# Patient Record
Sex: FEMALE | Race: WHITE | Employment: FULL TIME | URBAN - METROPOLITAN AREA
[De-identification: names, ages, dates, MRNs, and addresses within clinical notes are randomized per-mention and may not be internally consistent; named-entity substitution may affect disease eponyms.]

---
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
## 2018-01-22 VITALS
WEIGHT: 250 LBS | BODY MASS INDEX: 37.03 KG/M2 | HEIGHT: 69 IN | SYSTOLIC BLOOD PRESSURE: 134 MMHG | DIASTOLIC BLOOD PRESSURE: 84 MMHG

## 2018-03-10 ENCOUNTER — TELEPHONE (OUTPATIENT)
Dept: OBGYN CLINIC | Facility: CLINIC | Age: 18
End: 2018-03-10

## 2018-03-12 DIAGNOSIS — Z30.41 ENCOUNTER FOR SURVEILLANCE OF CONTRACEPTIVE PILLS: Primary | ICD-10-CM

## 2018-03-12 RX ORDER — DROSPIRENONE AND ETHINYL ESTRADIOL TABLETS 0.02-3(28)
1 KIT ORAL DAILY
Qty: 90 TABLET | Refills: 0 | Status: SHIPPED | OUTPATIENT
Start: 2018-03-12 | End: 2018-04-03 | Stop reason: SDUPTHER

## 2018-03-12 RX ORDER — DROSPIRENONE AND ETHINYL ESTRADIOL TABLETS 0.02-3(28)
KIT ORAL
COMMUNITY
Start: 2018-01-27 | End: 2018-03-12 | Stop reason: SDUPTHER

## 2018-03-20 LAB
ATYPICAL LYMPH (HISTORICAL): 5
BASOPHILS # BLD AUTO: 0.1 X3/UL (ref 0–0.3)
BASOPHILS # BLD AUTO: 0.7 % (ref 0–2)
DEPRECATED RDW RBC AUTO: 19.2 % (ref 11.5–14.5)
EOSINOPHIL # BLD AUTO: 0.2 X3/UL (ref 0–0.5)
EOSINOPHIL NFR BLD AUTO: 1.5 % (ref 0–5)
HCT VFR BLD AUTO: 32 % (ref 37–47)
HGB BLD-MCNC: 10.1 G/DL (ref 12–16)
LYMPHOCYTES # BLD AUTO: 7.8 X3/UL (ref 1.2–4.2)
LYMPHOCYTES NFR BLD AUTO: 48 % (ref 20.5–51.1)
LYMPHOCYTES NFR BLD AUTO: 72.8 % (ref 20.5–51.1)
MCH RBC QN AUTO: 20.9 PG (ref 26–34)
MCHC RBC AUTO-ENTMCNC: 31.6 G/DL (ref 31–36)
MCV RBC AUTO: 66.3 FL (ref 81–99)
MICROCYTOSIS (HISTORICAL): ABNORMAL
MONOCYTES # BLD AUTO: 0.6 X3/UL (ref 0–1)
MONOCYTES (HISTORICAL): 25 % (ref 1.7–12)
MONOCYTES NFR BLD AUTO: 5.7 % (ref 1.7–12)
NEUTROPHILS # BLD AUTO: 2.1 X3/UL (ref 1.4–6.5)
NEUTROPHILS ABS COUNT (HISTORICAL): 22 % (ref 42.2–75.2)
NEUTS SEG NFR BLD AUTO: 19.3 % (ref 42.2–75.2)
PLATELET # BLD AUTO: 286 X3/UL (ref 130–400)
PLATELET ESTIMATE (HISTORICAL): NORMAL
PMV BLD AUTO: 9.5 FL (ref 8.6–11.7)
POIKILOCYTOSIS (HISTORICAL): SLIGHT
RBC # BLD AUTO: 4.83 X6/UL (ref 3.9–5.2)
WBC # BLD AUTO: 10.7 X3/UL (ref 4.8–10.8)

## 2018-03-23 RX ORDER — DROSPIRENONE AND ETHINYL ESTRADIOL TABLETS 0.02-3(28)
KIT ORAL
Qty: 28 TABLET | Refills: 3 | OUTPATIENT
Start: 2018-03-23

## 2018-03-23 NOTE — TELEPHONE ENCOUNTER
Maya please refuse medications so that we can drop this off of our task bin    Patient aware no more refills until pillcheck is scheduled

## 2018-03-27 ENCOUNTER — TELEPHONE (OUTPATIENT)
Dept: OBGYN CLINIC | Facility: CLINIC | Age: 18
End: 2018-03-27

## 2018-04-03 ENCOUNTER — OFFICE VISIT (OUTPATIENT)
Dept: OBGYN CLINIC | Facility: CLINIC | Age: 18
End: 2018-04-03
Payer: COMMERCIAL

## 2018-04-03 VITALS
DIASTOLIC BLOOD PRESSURE: 78 MMHG | WEIGHT: 250 LBS | BODY MASS INDEX: 37.03 KG/M2 | HEIGHT: 69 IN | SYSTOLIC BLOOD PRESSURE: 118 MMHG

## 2018-04-03 DIAGNOSIS — Z30.41 ENCOUNTER FOR SURVEILLANCE OF CONTRACEPTIVE PILLS: ICD-10-CM

## 2018-04-03 DIAGNOSIS — N94.3 PMS (PREMENSTRUAL SYNDROME): Primary | ICD-10-CM

## 2018-04-03 PROCEDURE — 99213 OFFICE O/P EST LOW 20 MIN: CPT | Performed by: PHYSICIAN ASSISTANT

## 2018-04-03 RX ORDER — CLONIDINE HYDROCHLORIDE 0.1 MG/1
TABLET ORAL
Refills: 3 | COMMUNITY
End: 2019-08-03

## 2018-04-03 RX ORDER — HYDROXYZINE 50 MG/1
TABLET, FILM COATED ORAL
COMMUNITY
End: 2019-03-13

## 2018-04-03 RX ORDER — OMEPRAZOLE 20 MG/1
CAPSULE, DELAYED RELEASE ORAL
Refills: 10 | COMMUNITY
Start: 2018-03-07 | End: 2019-08-03

## 2018-04-03 RX ORDER — DIPHENHYDRAMINE HYDROCHLORIDE 25 MG/1
75 CAPSULE ORAL
Refills: 2 | COMMUNITY
Start: 2018-03-02 | End: 2019-08-03

## 2018-04-03 RX ORDER — TRIHEXYPHENIDYL HYDROCHLORIDE 5 MG/1
TABLET ORAL
COMMUNITY
Start: 2018-04-01 | End: 2019-08-03

## 2018-04-03 RX ORDER — ESCITALOPRAM OXALATE 20 MG/1
20 TABLET ORAL EVERY MORNING
Refills: 3 | COMMUNITY
Start: 2018-03-23 | End: 2019-03-13

## 2018-04-03 RX ORDER — ZIPRASIDONE HYDROCHLORIDE 20 MG/1
20 CAPSULE ORAL
Refills: 2 | COMMUNITY
Start: 2018-03-12 | End: 2019-03-13

## 2018-04-03 RX ORDER — ALBUTEROL SULFATE 90 UG/1
AEROSOL, METERED RESPIRATORY (INHALATION)
COMMUNITY
Start: 2017-06-30

## 2018-04-03 RX ORDER — FERROUS SULFATE 325(65) MG
TABLET ORAL
Refills: 1 | COMMUNITY
Start: 2018-03-08 | End: 2019-08-03

## 2018-04-03 RX ORDER — ZIPRASIDONE HYDROCHLORIDE 80 MG/1
80 CAPSULE ORAL 2 TIMES DAILY
Refills: 2 | COMMUNITY
Start: 2018-03-18 | End: 2019-03-13

## 2018-04-03 RX ORDER — DROSPIRENONE AND ETHINYL ESTRADIOL TABLETS 0.02-3(28)
1 KIT ORAL DAILY
Qty: 90 TABLET | Refills: 0 | Status: SHIPPED | OUTPATIENT
Start: 2018-04-03 | End: 2018-05-08 | Stop reason: ALTCHOICE

## 2018-04-03 NOTE — PROGRESS NOTES
Assessment/Plan:    PMS (premenstrual syndrome)  Reviewed birth control options with patient including Depo Provera, Nexplanon, NuvaRing, Mirena and Paragard IUD  Most interested in C/ Canarias 9  Reviewed insertion, removal, common bleeding patterns and side effects  Reviewed risks of damage to nerves, blood vessels during insertion or removal  Form to check insurance for coverage given to patient today, patient is planning on changing insurance in the next months  Will wait until new insurance card comes in and then fax form over with new card  Will plan to insert with menses pending insurance coverage  Would like to continue Carrol in meantime  Problem List Items Addressed This Visit     PMS (premenstrual syndrome) - Primary     Reviewed birth control options with patient including Depo Provera, Nexplanon, NuvaRing, Mirena and Paragard IUD  Most interested in C/ Canarias 9  Reviewed insertion, removal, common bleeding patterns and side effects  Reviewed risks of damage to nerves, blood vessels during insertion or removal  Form to check insurance for coverage given to patient today, patient is planning on changing insurance in the next months  Will wait until new insurance card comes in and then fax form over with new card  Will plan to insert with menses pending insurance coverage  Would like to continue Carrol in meantime  Relevant Medications    escitalopram (LEXAPRO) 20 mg tablet    ziprasidone (GEODON) 80 mg capsule    ziprasidone (GEODON) 20 mg capsule    hydrOXYzine HCL (ATARAX) 50 mg tablet      Other Visit Diagnoses     Encounter for surveillance of contraceptive pills        Relevant Medications    LORYNA 3-0 02 MG per tablet            Subjective:      Patient ID: Debi Ortez is a 21 y o  female  HPI  20 yo seen for pill check, was started on Carrol OCP 10/31/2017 for increased PMS symptoms  Patient reports has not noticed a difference   Patient does have extensive psychiatric history and follows closely with psychiatrist  Reports noticing more weight gain on this pill and issues with binge eating, would like to try something different interested in longer term method of birth control  The following portions of the patient's history were reviewed and updated as appropriate:   She  has no past medical history on file  She   Patient Active Problem List    Diagnosis Date Noted    PMS (premenstrual syndrome) 10/31/2017    Breast abscess 12/02/2016    Depression 12/02/2016     She  has no past surgical history on file  Her family history includes Leukemia in her mother  She  reports that she has never smoked  She does not have any smokeless tobacco history on file  She reports that she does not drink alcohol or use drugs  Current Outpatient Prescriptions   Medication Sig Dispense Refill    albuterol (PROVENTIL HFA,VENTOLIN HFA) 90 mcg/act inhaler 2 puff(s)      BANOPHEN 25 MG capsule Take 75 mg by mouth daily at bedtime  2    cloNIDine (CATAPRES) 0 1 mg tablet TAKE 1/2 TABLET TWICE A DAY AND 1 TABLET AT BEDTIME  3    escitalopram (LEXAPRO) 20 mg tablet 20 mg every morning  3    ferrous sulfate 325 (65 Fe) mg tablet TAKE 1 TABLET 3 TIMES A DAY BY ORAL ROUTE   1    hydrOXYzine HCL (ATARAX) 50 mg tablet 1 tab(s)      LORYNA 3-0 02 MG per tablet Take 1 tablet by mouth daily 90 tablet 0    omeprazole (PriLOSEC) 20 mg delayed release capsule TAKE ONE CAPSULE BY MOUTH TWICE A DAY BEFORE MEALS  10    trihexyphenidyl (ARTANE) 5 mg tablet       ziprasidone (GEODON) 20 mg capsule Take 20 mg by mouth daily at bedtime  2    ziprasidone (GEODON) 80 mg capsule Take 80 mg by mouth 2 (two) times a day  2     No current facility-administered medications for this visit  She has no allergies on file       Review of Systems   Constitutional: Negative for fatigue, fever and unexpected weight change  HENT: Negative for dental problem and sinus pressure  Eyes: Negative for visual disturbance  Respiratory: Negative for cough, shortness of breath and wheezing  Cardiovascular: Negative for chest pain  Gastrointestinal: Negative for abdominal pain, blood in stool, constipation, diarrhea, nausea and vomiting  Endocrine: Negative for polydipsia  Genitourinary: Negative for difficulty urinating, dyspareunia, dysuria, frequency, hematuria, pelvic pain and urgency  Musculoskeletal: Negative for arthralgias and back pain  Neurological: Negative for dizziness, seizures, light-headedness and headaches  Psychiatric/Behavioral: Negative for suicidal ideas  The patient is not nervous/anxious  Objective:      /78 (BP Location: Left arm, Patient Position: Sitting, Cuff Size: Adult)   Ht 5' 9" (1 753 m)   Wt 113 kg (250 lb)   BMI 36 92 kg/m²          Physical Exam   Constitutional: She is oriented to person, place, and time  She appears well-developed and well-nourished  Neck: No thyromegaly present  Cardiovascular: Normal rate, regular rhythm and normal heart sounds  Exam reveals no gallop and no friction rub  No murmur heard  Pulmonary/Chest: Effort normal and breath sounds normal  No respiratory distress  She has no wheezes  She has no rales  She exhibits no tenderness  Neurological: She is alert and oriented to person, place, and time  Skin: Skin is warm and dry  Psychiatric: She has a normal mood and affect   Her behavior is normal

## 2018-04-03 NOTE — ASSESSMENT & PLAN NOTE
Reviewed birth control options with patient including Depo Provera, Nexplanon, NuvaRing, Mirena and Paragard IUD  Most interested in C/ Canarias 9  Reviewed insertion, removal, common bleeding patterns and side effects  Reviewed risks of damage to nerves, blood vessels during insertion or removal  Form to check insurance for coverage given to patient today, patient is planning on changing insurance in the next months  Will wait until new insurance card comes in and then fax form over with new card  Will plan to insert with menses pending insurance coverage  Would like to continue Carrol in meantime

## 2018-04-20 ENCOUNTER — TELEPHONE (OUTPATIENT)
Dept: OBGYN CLINIC | Facility: CLINIC | Age: 18
End: 2018-04-20

## 2018-04-24 ENCOUNTER — TELEPHONE (OUTPATIENT)
Dept: OBGYN CLINIC | Facility: CLINIC | Age: 18
End: 2018-04-24

## 2018-04-26 ENCOUNTER — TELEPHONE (OUTPATIENT)
Dept: LABOR AND DELIVERY | Facility: HOSPITAL | Age: 18
End: 2018-04-26

## 2018-04-26 NOTE — TELEPHONE ENCOUNTER
Phone call to patient  Reviewed with patient device nexplanon function, risks, benefits, and placement procedure  Patient reports understanding and consent with no further questions  Patient desires to proceed and will follow up at appointment

## 2018-05-08 ENCOUNTER — PROCEDURE VISIT (OUTPATIENT)
Dept: OBGYN CLINIC | Facility: CLINIC | Age: 18
End: 2018-05-08
Payer: COMMERCIAL

## 2018-05-08 VITALS
DIASTOLIC BLOOD PRESSURE: 88 MMHG | HEIGHT: 68 IN | WEIGHT: 250 LBS | BODY MASS INDEX: 37.89 KG/M2 | SYSTOLIC BLOOD PRESSURE: 128 MMHG

## 2018-05-08 DIAGNOSIS — Z30.017 NEXPLANON INSERTION: Primary | ICD-10-CM

## 2018-05-08 PROBLEM — L70.0 ACNE VULGARIS: Status: ACTIVE | Noted: 2018-05-08

## 2018-05-08 PROBLEM — K21.9 GASTROESOPHAGEAL REFLUX DISEASE: Status: ACTIVE | Noted: 2018-05-08

## 2018-05-08 PROBLEM — F32.A DEPRESSIVE DISORDER: Status: ACTIVE | Noted: 2018-05-08

## 2018-05-08 PROBLEM — D64.9 ANEMIA: Status: ACTIVE | Noted: 2018-03-01

## 2018-05-08 PROBLEM — F41.1 ANXIETY STATE: Status: ACTIVE | Noted: 2018-05-08

## 2018-05-08 LAB — SL AMB POCT URINE HCG: NEGATIVE

## 2018-05-08 PROCEDURE — 11981 INSERTION DRUG DLVR IMPLANT: CPT | Performed by: PHYSICIAN ASSISTANT

## 2018-05-08 PROCEDURE — 81025 URINE PREGNANCY TEST: CPT | Performed by: PHYSICIAN ASSISTANT

## 2018-05-08 NOTE — PROGRESS NOTES
Remove and insert drug implant  Date/Time: 5/8/2018 1:26 PM  Performed by: Carolina Poole  Authorized by: Carolina Poole     Consent:     Consent obtained:  Verbal and written    Consent given by:  Patient    Procedural risks discussed:  Bleeding, damage to other organs, possible continued pain, possible loss of function, failure rate and infection    Patient questions answered: yes      Patient agrees, verbalizes understanding, and wants to proceed: yes      Instructions and paperwork completed: yes    Indication:     Indication: Insertion of non-biodegradable drug delivery implant    Pre-procedure:     Prepped with: povidone-iodine      Local anesthetic:  Lidocaine 1%    The site was cleaned and prepped in a sterile fashion: yes    Procedure:     Procedure:   Insertion    Left/right:  Left    Preloaded Implanon trocar was placed subdermally: yes      Visualization of implant was obtained: yes      Implanon was inserted and trocar removed: yes      Palpitation confirms placement by provider and patient: yes      Site was closed with steri-strips and pressure bandage applied: yes

## 2018-05-17 ENCOUNTER — TELEPHONE (OUTPATIENT)
Dept: OBGYN CLINIC | Facility: CLINIC | Age: 18
End: 2018-05-17

## 2018-05-22 ENCOUNTER — TELEPHONE (OUTPATIENT)
Dept: OBGYN CLINIC | Facility: CLINIC | Age: 18
End: 2018-05-22

## 2018-05-22 ENCOUNTER — OFFICE VISIT (OUTPATIENT)
Dept: OBGYN CLINIC | Facility: CLINIC | Age: 18
End: 2018-05-22
Payer: COMMERCIAL

## 2018-05-22 VITALS
WEIGHT: 255 LBS | BODY MASS INDEX: 37.77 KG/M2 | SYSTOLIC BLOOD PRESSURE: 140 MMHG | HEIGHT: 69 IN | DIASTOLIC BLOOD PRESSURE: 92 MMHG

## 2018-05-22 DIAGNOSIS — Z30.46 NEXPLANON REMOVAL: Primary | ICD-10-CM

## 2018-05-22 PROCEDURE — 11982 REMOVE DRUG IMPLANT DEVICE: CPT | Performed by: NURSE PRACTITIONER

## 2018-05-22 NOTE — ASSESSMENT & PLAN NOTE
Pt presents today with complaints of increased mood swings and anxiety since placement of Nexplanon on 5/9/18  Pt states she followed up with psych after implant was placed and was advised that can be normal when changing hormones and birth controls  Pt states anxiety has become increasingly worse  Pt also states Nexplanon has increased her appetite and she has gained weight since placed and she is worried about weight gain as medication she was previously on made her gain weight and doesn't want to gain anymore weight  Pt states vaginal bleeding is irregular, but that does not bother her  Reviewed with patient normal after introducing new hormones to have an increase in mood swings and anxiety usually worse in the first month and will improve by month 3  Encouraged patient to keep Nexplanon a little longer to see if symptoms improve  Reviewed anxiety increasing may not be due to nexplanon and may remove nexplanon and anxiety may remain increased therefore highly encourage patient to follow with psych  Pt states she would like Nexplanon removed and declines any other form of contraception at this time  Would like to see how she feels free of hormones for a while  Reviewed with patient barrier method as contraception as return to fertility is almost immediate post Nexplanon removal   No heavy lifting with left arm for 48 hours  May remove bandage in 24 hours  RTO for annual exam or sooner as needed

## 2018-05-22 NOTE — PROGRESS NOTES
Remove and insert drug implant  Date/Time: 5/22/2018 4:00 PM  Performed by: Bill Fry  Authorized by: Bill Fry     Consent:     Consent obtained:  Verbal    Consent given by:  Patient    Procedural risks discussed:  Bleeding, damage to other organs, failure rate, infection, possible continued pain and repeat procedure    Patient questions answered: yes      Patient agrees, verbalizes understanding, and wants to proceed: yes    Indication:     Indication: Presence of non-biodegradable drug delivery implant    Pre-procedure:     Pre-procedure timeout performed: yes      Prepped with: povidone-iodine      Local anesthetic: 2% lidocaine  The site was cleaned and prepped in a sterile fashion: yes    Procedure:     Procedure:  Removal    Small stab incision was made in arm: yes      Left/right:  Left    Site was closed with steri-strips and pressure bandage applied: yes    Comments:      Nexplanon removed intact, patient visualized intact implant after removal      Nexplanon removal  Pt presents today with complaints of increased mood swings and anxiety since placement of Nexplanon on 5/9/18  Pt states she followed up with psych after implant was placed and was advised that can be normal when changing hormones and birth controls  Pt states anxiety has become increasingly worse  Pt also states Nexplanon has increased her appetite and she has gained weight since placed and she is worried about weight gain as medication she was previously on made her gain weight and doesn't want to gain anymore weight  Pt states vaginal bleeding is irregular, but that does not bother her  Reviewed with patient normal after introducing new hormones to have an increase in mood swings and anxiety usually worse in the first month and will improve by month 3  Encouraged patient to keep Nexplanon a little longer to see if symptoms improve   Reviewed anxiety increasing may not be due to nexplanon and may remove nexplanon and anxiety may remain increased therefore highly encourage patient to follow with psych  Pt states she would like Nexplanon removed and declines any other form of contraception at this time  Would like to see how she feels free of hormones for a while  Reviewed with patient barrier method as contraception as return to fertility is almost immediate post Nexplanon removal   No heavy lifting with left arm for 48 hours  May remove bandage in 24 hours  RTO for annual exam or sooner as needed

## 2018-06-27 DIAGNOSIS — Z30.41 ENCOUNTER FOR SURVEILLANCE OF CONTRACEPTIVE PILLS: ICD-10-CM

## 2018-06-27 RX ORDER — DROSPIRENONE AND ETHINYL ESTRADIOL TABLETS 0.02-3(28)
1 KIT ORAL DAILY
Qty: 84 TABLET | Refills: 0 | OUTPATIENT
Start: 2018-06-27

## 2018-07-19 DIAGNOSIS — Z30.41 ENCOUNTER FOR SURVEILLANCE OF CONTRACEPTIVE PILLS: ICD-10-CM

## 2018-07-20 ENCOUNTER — TELEPHONE (OUTPATIENT)
Dept: OBGYN CLINIC | Facility: CLINIC | Age: 18
End: 2018-07-20

## 2018-07-20 DIAGNOSIS — Z30.41 SURVEILLANCE OF CONTRACEPTIVE PILL: Primary | ICD-10-CM

## 2018-07-20 RX ORDER — DROSPIRENONE AND ETHINYL ESTRADIOL TABLETS 0.02-3(28)
1 KIT ORAL DAILY
Refills: 0 | COMMUNITY
Start: 2018-05-23 | End: 2018-07-20 | Stop reason: SDUPTHER

## 2018-07-20 RX ORDER — DROSPIRENONE AND ETHINYL ESTRADIOL TABLETS 0.02-3(28)
1 KIT ORAL DAILY
Qty: 84 TABLET | Refills: 0 | OUTPATIENT
Start: 2018-07-20

## 2018-07-20 RX ORDER — DROSPIRENONE AND ETHINYL ESTRADIOL TABLETS 0.02-3(28)
1 KIT ORAL DAILY
Qty: 90 TABLET | Refills: 3 | Status: SHIPPED | OUTPATIENT
Start: 2018-07-20 | End: 2019-03-13

## 2018-07-20 NOTE — TELEPHONE ENCOUNTER
Zandra Joseph's note said she she was going to trial off OCP after nexplanon removal  I sent script for [facility] into pharmacy

## 2018-07-20 NOTE — TELEPHONE ENCOUNTER
Needs refill for ocp  Had nexplanon removed and wants to go back on pill  [phone] is mom number  Pharmacy is [facility] in [place] [phone]

## 2018-07-20 NOTE — TELEPHONE ENCOUNTER
I see you denied rx stating patient is no longer on ocp  According to phone call from mom, she is back on ocp and nexplanon was removed  Please enter rx if appropriate or let me know what we need to do next

## 2018-11-21 ENCOUNTER — HOSPITAL ENCOUNTER (EMERGENCY)
Facility: HOSPITAL | Age: 18
Discharge: HOME | End: 2018-11-21
Attending: EMERGENCY MEDICINE | Admitting: EMERGENCY MEDICINE
Payer: COMMERCIAL

## 2018-11-21 VITALS
BODY MASS INDEX: 38.69 KG/M2 | SYSTOLIC BLOOD PRESSURE: 137 MMHG | DIASTOLIC BLOOD PRESSURE: 91 MMHG | WEIGHT: 262 LBS | HEART RATE: 98 BPM | TEMPERATURE: 99.3 F | RESPIRATION RATE: 16 BRPM | OXYGEN SATURATION: 97 %

## 2018-11-21 DIAGNOSIS — Z72.89 DELIBERATE SELF-CUTTING: ICD-10-CM

## 2018-11-21 DIAGNOSIS — F32.A DEPRESSED: Primary | ICD-10-CM

## 2018-11-21 LAB
AMPHETAMINES SERPL QL SCN: NEGATIVE
BARBITURATES UR QL: NEGATIVE
BENZODIAZ UR QL: NEGATIVE
COCAINE UR QL: NEGATIVE
ETHANOL EXG-MCNC: 0 MG/DL
EXT PREG TEST URINE: NEGATIVE
METHADONE UR QL: NEGATIVE
OPIATES UR QL SCN: NEGATIVE
PCP UR QL: NEGATIVE
THC UR QL: NEGATIVE

## 2018-11-21 PROCEDURE — 99284 EMERGENCY DEPT VISIT MOD MDM: CPT

## 2018-11-21 PROCEDURE — 82075 ASSAY OF BREATH ETHANOL: CPT | Performed by: EMERGENCY MEDICINE

## 2018-11-21 PROCEDURE — 80307 DRUG TEST PRSMV CHEM ANLYZR: CPT | Performed by: EMERGENCY MEDICINE

## 2018-11-21 PROCEDURE — 81025 URINE PREGNANCY TEST: CPT | Performed by: EMERGENCY MEDICINE

## 2018-11-21 RX ORDER — LAMOTRIGINE 100 MG/1
25 TABLET ORAL DAILY
COMMUNITY
Start: 2018-11-09 | End: 2019-08-03

## 2018-11-21 NOTE — DISCHARGE INSTRUCTIONS
Depression   AMBULATORY CARE:   Depression  is a medical condition that causes feelings of sadness or hopelessness that do not go away  Depression may cause you to lose interest in things you used to enjoy  These feelings may interfere with your daily life  Common symptoms include the following:   · Appetite changes, or weight gain or loss    · Trouble going to sleep or staying asleep, or sleeping too much    · Fatigue or lack of energy    · Feeling restless, irritable, or withdrawn    · Feeling worthless, hopeless, discouraged, or guilty    · Trouble concentrating, remembering things, doing daily tasks, or making decisions    · Thoughts about hurting or killing yourself  Call 911 for any of the following:   · You think about harming yourself or someone else  Contact your healthcare provider if:   · Your symptoms do not improve  · You cannot make it to your next appointment  · You have new symptoms  · You have questions or concerns about your condition or care  Treatment for depression  may include medicine to improve or balance your mood  Therapy may also be used to treat your depression  A therapist will help you learn to cope with your thoughts and feelings  Therapy can be done alone or in a group  It may also be done with family members or a significant other  Self-care:   · Get regular physical activity  Try to exercise for 30 minutes, 3 to 5 days a week  Work with your healthcare provider to develop an exercise plan that you enjoy  Physical activity may improve your symptoms  · Get enough sleep  Create a routine to help you relax before bed  You can listen to music, read, or do yoga  Try to go to bed and wake up at the same time every day  Sleep is important for emotional health  · Eat a variety of healthy foods from all of the food groups  A healthy meal plan is low in fat, salt, and added sugar   Ask your healthcare provider for more information about a meal plan that is right for you  · Do not drink alcohol or use drugs  Alcohol and drugs can make your symptoms worse  Follow up with your healthcare provider as directed: Your healthcare provider will monitor your progress at follow-up visits  He or she will also monitor your medicine if you take antidepressants  Your healthcare provider will ask if the medicine is helping  Tell him or her about any side effects or problems you may have with your medicine  The type or amount of medicine may need to be changed  Write down your questions so you remember to ask them during your visits  © 2017 [employer]  Information is for End User's use only and may not be sold, redistributed or otherwise used for commercial purposes  All illustrations and images included in CareNotes® are the copyrighted property of A D [employer] M [employer] Inc  or [employer] Skinner  The above information is an  only  It is not intended as medical advice for individual conditions or treatments  Talk to your doctor, nurse or pharmacist before following any medical regimen to see if it is safe and effective for you

## 2018-11-21 NOTE — ED NOTES
Gave pt back all her belongings for discharge     Sintia Mota  11/21/18 [address] [email or web address]

## 2018-11-21 NOTE — ED PROVIDER NOTES
History  Chief Complaint   Patient presents with    Psychiatric Evaluation     Pt reports feeling depressed after a break up several days ago  Pt crying during triage, deferring questions to her grandparents  Pt reports hx of SI with admission last year  Reports cutting her arms and legs today  History provided by:  Patient   used: No     76-year-old female with a history of bipolar disorder presented with increased depressive symptoms over the past few weeks likely related to a recent break-up with a boyfriend  She noted that today she began cutting her right thigh  States that she had cut in the past but has not been for about a year  She has the clean, new razor in the cut multiple horizontal superficial lacerations  There is no active bleeding  No signs of infection on exam   There is no drainage  Her tetanus is up-to-date  Advised bacitracin/Neosporin daily and wash with soap and water  She denies any suicidal ideation today or any other time recently  She feels safe going home  She does not want inpatient treatment  She used to be in counseling and is looking to set up again  Will discuss with crisis for outpatient referrals  Prior to Admission Medications   Prescriptions Last Dose Informant Patient Reported? Taking?    BANOPHEN 25 MG capsule   Yes Yes   Sig: Take 75 mg by mouth daily at bedtime   LORYNA 3-0 02 MG per tablet   No Yes   Sig: Take 1 tablet by mouth daily   albuterol (PROVENTIL HFA,VENTOLIN HFA) 90 mcg/act inhaler   Yes No   Si puff(s)   cloNIDine (CATAPRES) 0 1 mg tablet   Yes No   escitalopram (LEXAPRO) 20 mg tablet   Yes No   Si mg every morning   ferrous sulfate 325 (65 Fe) mg tablet   Yes No   Sig: TAKE 1 TABLET 3 TIMES A DAY BY ORAL ROUTE    hydrOXYzine HCL (ATARAX) 50 mg tablet   Yes No   Si tab(s)   lamoTRIgine (LaMICtal) 100 mg tablet   Yes Yes   Sig: Take 25 mg by mouth daily Take 1/2 tablet by mouth daily x 7 days, then 1 daily x 7 days, then 1 tab 2x daily thereafter - started 11/9/18   omeprazole (PriLOSEC) 20 mg delayed release capsule   Yes Yes   Sig: TAKE ONE CAPSULE BY MOUTH TWICE A DAY BEFORE MEALS   trihexyphenidyl (ARTANE) 5 mg tablet   Yes No   ziprasidone (GEODON) 20 mg capsule   Yes Yes   Sig: Take 20 mg by mouth daily at bedtime   ziprasidone (GEODON) 80 mg capsule   Yes No   Sig: Take 80 mg by mouth 2 (two) times a day      Facility-Administered Medications: None       Past Medical History:   Diagnosis Date    Depression     Psychiatric disorder     [facility]        History reviewed  No pertinent surgical history  Family History   Problem Relation Age of Onset    Leukemia Mother      I have reviewed and agree with the history as documented  Social History   Substance Use Topics    Smoking status: Never Smoker    Smokeless tobacco: Never Used    Alcohol use No        Review of Systems   Constitutional: Negative for activity change and appetite change  Respiratory: Negative for chest tightness and shortness of breath  Cardiovascular: Negative for chest pain  Musculoskeletal: Negative for back pain and neck stiffness  Psychiatric/Behavioral: Positive for dysphoric mood, self-injury and sleep disturbance  Negative for suicidal ideas  All other systems reviewed and are negative  Physical Exam  Physical Exam   Constitutional: She is oriented to person, place, and time  She appears well-developed and well-nourished  No distress  HENT:   Head: Normocephalic  Mouth/Throat: Oropharynx is clear and moist    Neck: Normal range of motion  Neck supple  Cardiovascular: Normal rate and regular rhythm  Neurological: She is alert and oriented to person, place, and time  Skin: Skin is warm and dry  Multiple linear lacerations on the right upper thigh, all superficial    Psychiatric: She has a normal mood and affect  Her behavior is normal    Nursing note and vitals reviewed        Vital Signs  ED Triage Vitals [11/21/18 1748]   Temperature Pulse Respirations Blood Pressure SpO2   99 3 °F (37 4 °C) 98 16 137/91 97 %      Temp Source Heart Rate Source Patient Position - Orthostatic VS BP Location FiO2 (%)   Oral -- -- -- --      Pain Score       No Pain           Vitals:    11/21/18 1748   BP: 137/91   Pulse: 98       Visual Acuity      ED Medications  Medications - No data to display    Diagnostic Studies  Results Reviewed     Procedure Component Value Units Date/Time    Rapid drug screen, urine [[id number]]  (Normal) Collected:  11/21/18 1807    Lab Status:  Final result Specimen:  Urine from Urine, Clean Catch Updated:  11/21/18 1829     Amph/Meth UR Negative     Barbiturate Ur Negative     Benzodiazepine Urine Negative     Cocaine Urine Negative     Methadone Urine Negative     Opiate Urine Negative     PCP Ur Negative     THC Urine Negative    Narrative:         FOR MEDICAL PURPOSES ONLY  IF CONFIRMATION NEEDED PLEASE CONTACT THE LAB WITHIN 5 DAYS      Drug Screen Cutoff Levels:  AMPHETAMINE/METHAMPHETAMINES  1000 ng/mL  BARBITURATES     200 ng/mL  BENZODIAZEPINES     200 ng/mL  COCAINE      300 ng/mL  METHADONE      300 ng/mL  OPIATES      300 ng/mL  PHENCYCLIDINE     25 ng/mL  THC       50 ng/mL    POCT pregnancy, urine [[id number]]  (Normal) Resulted:  11/21/18 1812    Lab Status:  Final result Specimen:  Urine Updated:  11/21/18 1812     EXT PREG TEST UR (Ref: Negative) negative    POCT alcohol breath test [[id number]]  (Normal) Resulted:  11/21/18 1807    Lab Status:  Final result Updated:  11/21/18 1807     EXTBreath Alcohol 0 00                 No orders to display              Procedures  Procedures       Phone Contacts  ED Phone Contact    ED Course                               MDM  Number of Diagnoses or Management Options  Deliberate self-cutting:   Depressed:   Diagnosis management comments: 78-year-old female with history of bipolar disorder presented for increasing depressive symptoms as well as recurrence of self cutting  No signs of infection or she cut on her right upper thigh earlier today  Tetanus up-to-date  She stable for discharge with outpatient follow-up  No suicidality  CritCare Time    Disposition  Final diagnoses:   Depressed   Deliberate self-cutting     Time reflects when diagnosis was documented in both MDM as applicable and the Disposition within this note     Time User Action Codes Description Comment    11/21/2018  6:32 PM Hayder Rice Add [F32 9] Depressed     11/21/2018  6:32 PM Ellie Villalobos Add [Z72 89] Deliberate self-cutting       ED Disposition     ED Disposition Condition Comment    Discharge  Syliva Endo discharge to home/self care  Condition at discharge: Good        MD Documentation      Most Recent Value   Sending MD Dr Mi Downing up With Specialties Details Why Contact Info Additional Information    [facility] Emergency Medicine  If symptoms worsen [address] [facility]   ED,  [address], [place], [place], [address]          Patient's Medications   Discharge Prescriptions    No medications on file     No discharge procedures on file      ED Provider  Electronically Signed by           Cassandra Miles MD  11/21/18 2250

## 2018-11-21 NOTE — ED NOTES
Crisis Worker (CW) did intake and safety assessment  Patient (Pt) admits to cutting her leg for release and stated that she did not cut for a year until a few days ago  Pt stated that her trigger was recent breakup with boyfriend  Pt denies suicidal and homicidal ideation  Pt denies psychosis  Pt stated that she has not been hospitalized for 3 years    Pt stated that she would like to connect with an outpatient therapist   [facility] with outpatient resources for therapist

## 2018-11-21 NOTE — ED NOTES
Pt changed into blues no issues or concerns pt belongings are logged bagged and in locker 20 will continue to monitor     Susanne Cristiano  11/21/18 [id number]

## 2018-11-22 NOTE — ED NOTES
Pt discharged with grandparents and all her belongings     Baljit Johnson  11/21/18 [address] [facility]  11/21/18 [address] [facility]

## 2019-02-28 ENCOUNTER — HOSPITAL ENCOUNTER (EMERGENCY)
Facility: HOSPITAL | Age: 19
Discharge: LEFT BEFORE DISCHARGE | End: 2019-02-28
Attending: EMERGENCY MEDICINE
Payer: COMMERCIAL

## 2019-02-28 VITALS
BODY MASS INDEX: 41.13 KG/M2 | RESPIRATION RATE: 18 BRPM | WEIGHT: 271.39 LBS | SYSTOLIC BLOOD PRESSURE: 160 MMHG | HEIGHT: 68 IN | DIASTOLIC BLOOD PRESSURE: 108 MMHG | HEART RATE: 117 BPM | OXYGEN SATURATION: 97 % | TEMPERATURE: 98.2 F

## 2019-02-28 DIAGNOSIS — R10.9 ABDOMINAL PAIN, UNSPECIFIED ABDOMINAL LOCATION: Primary | ICD-10-CM

## 2019-02-28 PROCEDURE — 99283 EMERGENCY DEPT VISIT LOW MDM: CPT

## 2019-03-01 NOTE — ED PROVIDER NOTES
History  Chief Complaint   Patient presents with    Abdominal Pain     pt started with sharp abdominal pain this am and also states that she has lower back pain , started with bleeding this am not sure if it is her period or not      HPI  Patient left without being seen after triage  Prior to Admission Medications   Prescriptions Last Dose Informant Patient Reported? Taking? BANOPHEN 25 MG capsule   Yes No   Sig: Take 75 mg by mouth daily at bedtime   LORYNA 3-0 02 MG per tablet   No No   Sig: Take 1 tablet by mouth daily   albuterol (PROVENTIL HFA,VENTOLIN HFA) 90 mcg/act inhaler   Yes No   Si puff(s)   cloNIDine (CATAPRES) 0 1 mg tablet   Yes No   escitalopram (LEXAPRO) 20 mg tablet   Yes No   Si mg every morning   ferrous sulfate 325 (65 Fe) mg tablet   Yes No   Sig: TAKE 1 TABLET 3 TIMES A DAY BY ORAL ROUTE    hydrOXYzine HCL (ATARAX) 50 mg tablet   Yes No   Si tab(s)   lamoTRIgine (LaMICtal) 100 mg tablet   Yes No   Sig: Take 25 mg by mouth daily Take 1/2 tablet by mouth daily x 7 days, then 1 daily x 7 days, then 1 tab 2x daily thereafter - started 18   omeprazole (PriLOSEC) 20 mg delayed release capsule   Yes No   Sig: TAKE ONE CAPSULE BY MOUTH TWICE A DAY BEFORE MEALS   trihexyphenidyl (ARTANE) 5 mg tablet   Yes No   ziprasidone (GEODON) 20 mg capsule   Yes No   Sig: Take 20 mg by mouth daily at bedtime   ziprasidone (GEODON) 80 mg capsule   Yes No   Sig: Take 80 mg by mouth 2 (two) times a day      Facility-Administered Medications: None       Past Medical History:   Diagnosis Date    Depression     Psychiatric disorder     Schizophrenia ([facility] [address] )        History reviewed  No pertinent surgical history  Family History   Problem Relation Age of Onset    Leukemia Mother      I have reviewed and agree with the history as documented      Social History     Tobacco Use    Smoking status: Never Smoker    Smokeless tobacco: Never Used   Substance Use Topics    Alcohol use: No    Drug use: No        Review of Systems    Physical Exam  Physical Exam    Vital Signs  ED Triage Vitals [02/28/19 1956]   Temperature Pulse Respirations Blood Pressure SpO2   98 2 °F (36 8 °C) (!) 117 18 (!) 160/108 97 %      Temp Source Heart Rate Source Patient Position - Orthostatic VS BP Location FiO2 (%)   Oral Monitor Sitting Left arm --      Pain Score       7           Vitals:    02/28/19 1956   BP: (!) 160/108   Pulse: (!) 117   Patient Position - Orthostatic VS: Sitting       Visual Acuity      ED Medications  Medications - No data to display    Diagnostic Studies  Results Reviewed     None                 No orders to display              Procedures  Procedures       Phone Contacts  ED Phone Contact    ED Course                               MDM    Disposition  Final diagnoses:   None     ED Disposition     ED Disposition Condition Date/Time Comment    [facility] after Triage  Thu Feb 28, 2019  9:35 PM       Follow-up Information    None         Discharge Medication List as of 2/28/2019  9:35 PM      CONTINUE these medications which have NOT CHANGED    Details   albuterol (PROVENTIL HFA,VENTOLIN HFA) 90 mcg/act inhaler 2 puff(s), Historical Med      BANOPHEN 25 MG capsule Take 75 mg by mouth daily at bedtime, Starting Fri 3/2/2018, Historical Med      cloNIDine (CATAPRES) 0 1 mg tablet Historical Med      escitalopram (LEXAPRO) 20 mg tablet 20 mg every morning, Starting Fri 3/23/2018, Historical Med      ferrous sulfate 325 (65 Fe) mg tablet TAKE 1 TABLET 3 TIMES A DAY BY ORAL ROUTE , Historical Med      hydrOXYzine HCL (ATARAX) 50 mg tablet 1 tab(s), Historical Med      lamoTRIgine (LaMICtal) 100 mg tablet Take 25 mg by mouth daily Take 1/2 tablet by mouth daily x 7 days, then 1 daily x 7 days, then 1 tab 2x daily thereafter - started 11/9/18, Starting Fri 11/9/2018, Historical Med      LORYNA 3-0 02 MG per tablet Take 1 tablet by mouth daily, Starting Fri 7/20/2018, Normal      omeprazole (PriLOSEC) 20 mg delayed release capsule TAKE ONE CAPSULE BY MOUTH TWICE A DAY BEFORE MEALS, Historical Med      trihexyphenidyl (ARTANE) 5 mg tablet Starting Sun 4/1/2018, Historical Med      !! ziprasidone (GEODON) 20 mg capsule Take 20 mg by mouth daily at bedtime, Starting Mon 3/12/2018, Historical Med      !! ziprasidone (GEODON) 80 mg capsule Take 80 mg by mouth 2 (two) times a day, Starting Sun 3/18/2018, Historical Med       !! - Potential duplicate medications found  Please discuss with provider  No discharge procedures on file      ED Provider  Electronically Signed by           Carter Liao MD  02/28/19 0869

## 2019-03-12 NOTE — PROGRESS NOTES
Assessment/Plan   Diagnoses and all orders for this visit:    Well woman exam    Encounter for surveillance of contraceptive pills  -     drospirenone-ethinyl estradiol (SIENA) 3-0 03 MG per tablet; Take 1 tablet by mouth daily    Other orders  -     FLUoxetine (PROzac) 40 MG capsule; Take 40 mg by mouth        Discussion    Pap smears will start at age 22 per the ASCCP guidelines  All questions have been answered to her satisfaction  RTO for APE or sooner if needed    Subjective     Pt for annual GYN exam and to discuss [place] options  Pt was seen in the hospital and admitted to psych unit in Dec last year  At that not point her OCPs were changed while in pt (unsure of which pill)  Since then her periods have been very irregular and much heavier  This last cycle she presented to the ER w significant cramping and pelvic pain  She never restarted her pills after being in the hospital    She was advised per her psychiatrist to start Carrol due to PMDD sx  I r/w pt that she was taking [place] previously which is Carrol  She isnt sure if her PMS/PMDD sx were any better on that or not  I did r w pt Lamictal use and decreased pill efficacy  I r/w pt different BC options to help minimize her bleeding and try to stabilize hormones to try to limit PMS/PMDD sx  Pt is hesitant to trial any other BC options at this point  Desires trial of [place] and will further review other info on Mirena  Pt had done Nexplanon previously but took out because she felt her anxiety was worsened on it, although her PMDD sx were not noted  Pt denies any vaginal d/c  No problems w IC  Declines any STD work up  Partner is present for exam and discussion today as well  Tomy Martinez is a 19 y o  female who presents for annual well woman exam    Menarche - 13; LMP - end of Febuary  No vulvar itch/burn; No vaginal itch/burn; No abn discharge or odor;  No urinary sx - burning/pain/frequency/hematuria  (+) SBEs - no breast masses, asymmetry, nipple discharge or bleeding, changes in skin of breast, or breast tenderness bilaterally  No abd/pelvic pain or HAs;   Pt is sexually active in a mutually monog sexual relationship; No issues with intercourse; She declines std/hiv/hep testing; Feels safe at home  Current contraception: OCPs    (+) PCP for routine Bw/care; Last Pap - NA     Review of Systems   Constitutional: Negative for fatigue, fever and unexpected weight change  HENT: Negative for dental problem, mouth sores, nosebleeds, rhinorrhea, sinus pressure, sinus pain and sore throat  Eyes: Negative for pain, discharge and visual disturbance  Respiratory: Negative for cough, chest tightness, shortness of breath and wheezing  Cardiovascular: Negative for chest pain, palpitations and leg swelling  Gastrointestinal: Negative for blood in stool, constipation, diarrhea, nausea and vomiting  Endocrine: Negative for polydipsia  Genitourinary: Negative for difficulty urinating, dyspareunia, dysuria, menstrual problem, pelvic pain, urgency, vaginal discharge and vaginal pain  Musculoskeletal: Negative for arthralgias, back pain and joint swelling  Allergic/Immunologic: Negative for environmental allergies  Neurological: Negative for seizures, light-headedness and headaches  Hematological: Does not bruise/bleed easily  Psychiatric/Behavioral: Positive for dysphoric mood  Negative for sleep disturbance  The patient is nervous/anxious          The following portions of the patient's history were reviewed and updated as appropriate: current medications, past family history, past medical history, past social history, past surgical history and problem list          OB History        0    Para   0    Term   0       0    AB   0    Living   0       SAB   0    TAB   0    Ectopic   0    Multiple   0    Live Births   0                 Past Medical History:   Diagnosis Date    Depression     Psychiatric disorder     Schizophrenia ([facility] [address] ) No past surgical history on file      Family History   Problem Relation Age of Onset    Leukemia Mother        Social History     Socioeconomic History    Marital status: Significant Other     Spouse name: Not on file    Number of children: Not on file    Years of education: Not on file    Highest education level: Not on file   Occupational History    Not on file   Social Needs    Financial resource strain: Not on file    Food insecurity:     Worry: Not on file     Inability: Not on file    Transportation needs:     Medical: Not on file     Non-medical: Not on file   Tobacco Use    Smoking status: Never Smoker    Smokeless tobacco: Never Used   Substance and Sexual Activity    Alcohol use: No    Drug use: No    Sexual activity: Yes     Partners: Male     Birth control/protection: OCP   Lifestyle    Physical activity:     Days per week: Not on file     Minutes per session: Not on file    Stress: Not on file   Relationships    Social connections:     Talks on phone: Not on file     Gets together: Not on file     Attends Jewish service: Not on file     Active member of club or organization: Not on file     Attends meetings of clubs or organizations: Not on file     Relationship status: Not on file    Intimate partner violence:     Fear of current or ex partner: Not on file     Emotionally abused: Not on file     Physically abused: Not on file     Forced sexual activity: Not on file   Other Topics Concern    Not on file   Social History Narrative    Not on file         Current Outpatient Medications:     albuterol (PROVENTIL HFA,VENTOLIN HFA) 90 mcg/act inhaler, 2 puff(s), Disp: , Rfl:     BANOPHEN 25 MG capsule, Take 75 mg by mouth daily at bedtime, Disp: , Rfl: 2    cloNIDine (CATAPRES) 0 1 mg tablet, , Disp: , Rfl: 3    ferrous sulfate 325 (65 Fe) mg tablet, TAKE 1 TABLET 3 TIMES A DAY BY ORAL ROUTE , Disp: , Rfl: 1    lamoTRIgine (LaMICtal) 100 mg tablet, Take 25 mg by mouth daily Take 1/2 tablet by mouth daily x 7 days, then 1 daily x 7 days, then 1 tab 2x daily thereafter - started 11/9/18, Disp: , Rfl:     omeprazole (PriLOSEC) 20 mg delayed release capsule, TAKE ONE CAPSULE BY MOUTH TWICE A DAY BEFORE MEALS, Disp: , Rfl: 10    trihexyphenidyl (ARTANE) 5 mg tablet, , Disp: , Rfl:     drospirenone-ethinyl estradiol (BRIAN) 3-0 03 MG per tablet, Take 1 tablet by mouth daily, Disp: 28 tablet, Rfl: 11    FLUoxetine (PROzac) 40 MG capsule, Take 40 mg by mouth, Disp: , Rfl:     Allergies   Allergen Reactions    Zolpidem Hallucinations and Other (See Comments)     Other reaction(s): Hallucinations       Objective   Vitals:    03/13/19 1113   BP: 124/80   BP Location: Left arm   Patient Position: Sitting   Cuff Size: Standard   Weight: 122 kg (269 lb)   Height: 5' 9" (1 753 m)     Physical Exam   Constitutional: She is oriented to person, place, and time  She appears well-developed and well-nourished  HENT:   Head: Normocephalic and atraumatic  Cardiovascular: Normal rate and regular rhythm  Pulmonary/Chest: Effort normal and breath sounds normal  Right breast exhibits no inverted nipple, no mass, no nipple discharge, no skin change and no tenderness  Left breast exhibits no inverted nipple, no mass, no nipple discharge, no skin change and no tenderness  Breasts are symmetrical    Abdominal: Soft  She exhibits no distension and no mass  There is no rebound and no guarding  Genitourinary: Vagina normal and uterus normal  No vaginal discharge found  Neurological: She is alert and oriented to person, place, and time  Skin: Skin is warm and dry  Psychiatric: She has a normal mood and affect  Patient Instructions   At this point pt desires to restart OCPs w next cycle  She sees her psychiatrist next month and will further review the Mirena w her at that time  I did r w pt and partner need for back up birth control while taking lamictal and OCPs     Pt desires UPT in office today which was done and is negative  F/u 1 year for APE, paps will be started at that time as well

## 2019-03-13 ENCOUNTER — ANNUAL EXAM (OUTPATIENT)
Dept: OBGYN CLINIC | Facility: CLINIC | Age: 19
End: 2019-03-13
Payer: COMMERCIAL

## 2019-03-13 VITALS
DIASTOLIC BLOOD PRESSURE: 80 MMHG | WEIGHT: 269 LBS | BODY MASS INDEX: 39.84 KG/M2 | SYSTOLIC BLOOD PRESSURE: 124 MMHG | HEIGHT: 69 IN

## 2019-03-13 DIAGNOSIS — Z01.419 WELL WOMAN EXAM: Primary | ICD-10-CM

## 2019-03-13 DIAGNOSIS — Z30.41 ENCOUNTER FOR SURVEILLANCE OF CONTRACEPTIVE PILLS: ICD-10-CM

## 2019-03-13 PROCEDURE — S0612 ANNUAL GYNECOLOGICAL EXAMINA: HCPCS | Performed by: PHYSICIAN ASSISTANT

## 2019-03-13 RX ORDER — FLUOXETINE HYDROCHLORIDE 40 MG/1
40 CAPSULE ORAL
COMMUNITY
End: 2019-08-03

## 2019-03-13 RX ORDER — DROSPIRENONE AND ETHINYL ESTRADIOL 0.03MG-3MG
1 KIT ORAL DAILY
Qty: 28 TABLET | Refills: 11 | Status: SHIPPED | OUTPATIENT
Start: 2019-03-13 | End: 2020-04-09 | Stop reason: SDUPTHER

## 2019-03-13 NOTE — PATIENT INSTRUCTIONS
At this point pt desires to restart OCPs w next cycle  She sees her psychiatrist next month and will further review the Mirena w her at that time  I did r w pt and partner need for back up birth control while taking lamictal and OCPs  Pt desires UPT in office today which was done and is negative  F/u 1 year for APE, paps will be started at that time as well 
Statement Selected

## 2019-05-31 ENCOUNTER — TELEPHONE (OUTPATIENT)
Dept: OBGYN CLINIC | Facility: CLINIC | Age: 19
End: 2019-05-31

## 2019-08-03 ENCOUNTER — HOSPITAL ENCOUNTER (EMERGENCY)
Facility: HOSPITAL | Age: 19
Discharge: HOME | End: 2019-08-03
Attending: EMERGENCY MEDICINE | Admitting: EMERGENCY MEDICINE
Payer: COMMERCIAL

## 2019-08-03 ENCOUNTER — APPOINTMENT (EMERGENCY)
Dept: CT IMAGING | Facility: HOSPITAL | Age: 19
End: 2019-08-03
Payer: COMMERCIAL

## 2019-08-03 VITALS
TEMPERATURE: 99.2 F | BODY MASS INDEX: 39.12 KG/M2 | WEIGHT: 264.11 LBS | HEIGHT: 69 IN | HEART RATE: 94 BPM | SYSTOLIC BLOOD PRESSURE: 107 MMHG | DIASTOLIC BLOOD PRESSURE: 65 MMHG | OXYGEN SATURATION: 99 % | RESPIRATION RATE: 16 BRPM

## 2019-08-03 DIAGNOSIS — R10.9 RIGHT FLANK PAIN: Primary | ICD-10-CM

## 2019-08-03 LAB
ALBUMIN SERPL BCP-MCNC: 3.8 G/DL (ref 3.5–5)
ALP SERPL-CCNC: 98 U/L (ref 46–116)
ALT SERPL W P-5'-P-CCNC: 17 U/L (ref 12–78)
ANION GAP SERPL CALCULATED.3IONS-SCNC: 9 MMOL/L (ref 4–13)
AST SERPL W P-5'-P-CCNC: 13 U/L (ref 5–45)
BACTERIA UR QL AUTO: ABNORMAL /HPF
BASOPHILS # BLD AUTO: 0.06 THOUSANDS/ΜL (ref 0–0.1)
BASOPHILS NFR BLD AUTO: 1 % (ref 0–1)
BILIRUB SERPL-MCNC: 0.3 MG/DL (ref 0.2–1)
BILIRUB UR QL STRIP: NEGATIVE
BUN SERPL-MCNC: 12 MG/DL (ref 5–25)
CALCIUM SERPL-MCNC: 9.8 MG/DL (ref 8.3–10.1)
CHLORIDE SERPL-SCNC: 103 MMOL/L (ref 100–108)
CLARITY UR: CLEAR
CO2 SERPL-SCNC: 27 MMOL/L (ref 21–32)
COLOR UR: YELLOW
CREAT SERPL-MCNC: 0.7 MG/DL (ref 0.6–1.3)
DEPRECATED D DIMER PPP: <270 NG/ML (FEU)
EOSINOPHIL # BLD AUTO: 0.66 THOUSAND/ΜL (ref 0–0.61)
EOSINOPHIL NFR BLD AUTO: 6 % (ref 0–6)
ERYTHROCYTE [DISTWIDTH] IN BLOOD BY AUTOMATED COUNT: 13.9 % (ref 11.6–15.1)
EXT PREG TEST URINE: NEGATIVE
EXT. CONTROL ED NAV: NORMAL
GFR SERPL CREATININE-BSD FRML MDRD: 125 ML/MIN/1.73SQ M
GLUCOSE SERPL-MCNC: 80 MG/DL (ref 65–140)
GLUCOSE UR STRIP-MCNC: NEGATIVE MG/DL
HCT VFR BLD AUTO: 41.3 % (ref 34.8–46.1)
HGB BLD-MCNC: 13.2 G/DL (ref 11.5–15.4)
HGB UR QL STRIP.AUTO: NEGATIVE
IMM GRANULOCYTES # BLD AUTO: 0.03 THOUSAND/UL (ref 0–0.2)
IMM GRANULOCYTES NFR BLD AUTO: 0 % (ref 0–2)
KETONES UR STRIP-MCNC: NEGATIVE MG/DL
LEUKOCYTE ESTERASE UR QL STRIP: ABNORMAL
LIPASE SERPL-CCNC: 114 U/L (ref 73–393)
LYMPHOCYTES # BLD AUTO: 3.8 THOUSANDS/ΜL (ref 0.6–4.47)
LYMPHOCYTES NFR BLD AUTO: 36 % (ref 14–44)
MCH RBC QN AUTO: 25.4 PG (ref 26.8–34.3)
MCHC RBC AUTO-ENTMCNC: 32 G/DL (ref 31.4–37.4)
MCV RBC AUTO: 80 FL (ref 82–98)
MONOCYTES # BLD AUTO: 0.77 THOUSAND/ΜL (ref 0.17–1.22)
MONOCYTES NFR BLD AUTO: 7 % (ref 4–12)
NEUTROPHILS # BLD AUTO: 5.26 THOUSANDS/ΜL (ref 1.85–7.62)
NEUTS SEG NFR BLD AUTO: 50 % (ref 43–75)
NITRITE UR QL STRIP: NEGATIVE
NON-SQ EPI CELLS URNS QL MICRO: ABNORMAL /HPF
NRBC BLD AUTO-RTO: 0 /100 WBCS
PH UR STRIP.AUTO: 6 [PH]
PLATELET # BLD AUTO: 345 THOUSANDS/UL (ref 149–390)
PMV BLD AUTO: 10.8 FL (ref 8.9–12.7)
POTASSIUM SERPL-SCNC: 4.2 MMOL/L (ref 3.5–5.3)
PROT SERPL-MCNC: 8.1 G/DL (ref 6.4–8.2)
PROT UR STRIP-MCNC: NEGATIVE MG/DL
RBC # BLD AUTO: 5.19 MILLION/UL (ref 3.81–5.12)
RBC #/AREA URNS AUTO: ABNORMAL /HPF
SODIUM SERPL-SCNC: 139 MMOL/L (ref 136–145)
SP GR UR STRIP.AUTO: >=1.03 (ref 1–1.03)
UROBILINOGEN UR QL STRIP.AUTO: 0.2 E.U./DL
WBC # BLD AUTO: 10.58 THOUSAND/UL (ref 4.31–10.16)
WBC #/AREA URNS AUTO: ABNORMAL /HPF

## 2019-08-03 PROCEDURE — 99284 EMERGENCY DEPT VISIT MOD MDM: CPT

## 2019-08-03 PROCEDURE — 99284 EMERGENCY DEPT VISIT MOD MDM: CPT | Performed by: EMERGENCY MEDICINE

## 2019-08-03 PROCEDURE — 80053 COMPREHEN METABOLIC PANEL: CPT | Performed by: PHYSICIAN ASSISTANT

## 2019-08-03 PROCEDURE — 36415 COLL VENOUS BLD VENIPUNCTURE: CPT | Performed by: PHYSICIAN ASSISTANT

## 2019-08-03 PROCEDURE — 81025 URINE PREGNANCY TEST: CPT | Performed by: PHYSICIAN ASSISTANT

## 2019-08-03 PROCEDURE — 85025 COMPLETE CBC W/AUTO DIFF WBC: CPT | Performed by: PHYSICIAN ASSISTANT

## 2019-08-03 PROCEDURE — 96374 THER/PROPH/DIAG INJ IV PUSH: CPT

## 2019-08-03 PROCEDURE — 83690 ASSAY OF LIPASE: CPT | Performed by: PHYSICIAN ASSISTANT

## 2019-08-03 PROCEDURE — 85379 FIBRIN DEGRADATION QUANT: CPT | Performed by: PHYSICIAN ASSISTANT

## 2019-08-03 PROCEDURE — 96361 HYDRATE IV INFUSION ADD-ON: CPT

## 2019-08-03 PROCEDURE — 74177 CT ABD & PELVIS W/CONTRAST: CPT

## 2019-08-03 PROCEDURE — 81001 URINALYSIS AUTO W/SCOPE: CPT | Performed by: PHYSICIAN ASSISTANT

## 2019-08-03 RX ORDER — CITALOPRAM 40 MG/1
40 TABLET ORAL DAILY
COMMUNITY
End: 2021-06-30

## 2019-08-03 RX ORDER — ONDANSETRON 4 MG/1
4 TABLET, ORALLY DISINTEGRATING ORAL EVERY 8 HOURS PRN
Qty: 15 TABLET | Refills: 0 | Status: SHIPPED | OUTPATIENT
Start: 2019-08-03 | End: 2020-04-09

## 2019-08-03 RX ORDER — KETOROLAC TROMETHAMINE 30 MG/ML
15 INJECTION, SOLUTION INTRAMUSCULAR; INTRAVENOUS ONCE
Status: COMPLETED | OUTPATIENT
Start: 2019-08-03 | End: 2019-08-03

## 2019-08-03 RX ADMIN — IOHEXOL 100 ML: 350 INJECTION, SOLUTION INTRAVENOUS at 17:19

## 2019-08-03 RX ADMIN — SODIUM CHLORIDE 1000 ML: 0.9 INJECTION, SOLUTION INTRAVENOUS at 16:22

## 2019-08-03 RX ADMIN — KETOROLAC TROMETHAMINE 15 MG: 30 INJECTION, SOLUTION INTRAMUSCULAR at 16:23

## 2019-08-03 NOTE — ED PROVIDER NOTES
History  Chief Complaint   Patient presents with    Flank Pain     Patient c/o right-sided flank pain x's 1 week with lower back pain  Back pain has subsided, but still has flank pain  Denies fever/chills/urinary symptoms  20 yo female with right flank pain  Onset one week ago  Gradual  Getting worse  Waxing and waning  Suddenly worsened yesterday  Pain has not moved since onset  Does not radiate  Worsens when she takes a deep breath in but does not feel short of breath or have chest pain  Normal urination  Bowel movements baseline  No diarrhea  No vaginal bleeding or discharge  H/o endometriosis  LMP was about 1 5 weeks ago  Denies fever, chills, vomiting  No neck pain, headache, lightheadedness, dizziness  No cardiac history or h/o PE or DVT  She is on oral contraceptives  No recent travels, traumas, surgeries         History provided by:  Patient   used: No    Flank Pain   Pain location:  R flank  Pain quality: sharp    Pain radiates to:  Does not radiate  Pain severity:  Moderate  Onset quality:  Gradual  Duration:  1 week  Timing:  Constant  Progression:  Waxing and waning  Chronicity:  New  Context: not alcohol use, not awakening from sleep, not diet changes, not eating, not laxative use, not medication withdrawal, not previous surgeries, not recent illness, not recent sexual activity, not recent travel, not retching, not sick contacts, not suspicious food intake and not trauma    Relieved by:  Nothing  Worsened by:  Nothing  Ineffective treatments:  None tried  Associated symptoms: nausea    Associated symptoms: no anorexia, no belching, no chest pain, no chills, no constipation, no cough, no diarrhea, no dysuria, no fatigue, no fever, no flatus, no hematemesis, no hematochezia, no hematuria, no melena, no shortness of breath, no sore throat, no vaginal bleeding, no vaginal discharge and no vomiting    Risk factors: no alcohol abuse, no aspirin use, not elderly, has not had multiple surgeries, no NSAID use, not obese, not pregnant and no recent hospitalization        Prior to Admission Medications   Prescriptions Last Dose Informant Patient Reported? Taking? albuterol (PROVENTIL HFA,VENTOLIN HFA) 90 mcg/act inhaler   Yes No   Si puff(s)   citalopram (CeleXA) 40 mg tablet   Yes Yes   Sig: Take 40 mg by mouth daily   drospirenone-ethinyl estradiol (BRIAN) 3-0 03 MG per tablet   No No   Sig: Take 1 tablet by mouth daily      Facility-Administered Medications: None       Past Medical History:   Diagnosis Date    Depression     Psychiatric disorder     [facility]        History reviewed  No pertinent surgical history  Family History   Problem Relation Age of Onset    Leukemia Mother      I have reviewed and agree with the history as documented  Social History     Tobacco Use    Smoking status: Never Smoker    Smokeless tobacco: Never Used   Substance Use Topics    Alcohol use: No    Drug use: No        Review of Systems   Constitutional: Negative for activity change, appetite change, chills, diaphoresis, fatigue, fever and unexpected weight change  HENT: Negative for congestion, rhinorrhea, sinus pressure, sore throat and trouble swallowing  Eyes: Negative for photophobia and visual disturbance  Respiratory: Negative for apnea, cough, choking, chest tightness, shortness of breath, wheezing and stridor  Cardiovascular: Negative for chest pain, palpitations and leg swelling  Gastrointestinal: Positive for nausea  Negative for abdominal distention, abdominal pain, anorexia, blood in stool, constipation, diarrhea, flatus, hematemesis, hematochezia, melena and vomiting  Genitourinary: Positive for flank pain  Negative for decreased urine volume, difficulty urinating, dysuria, enuresis, frequency, hematuria, urgency, vaginal bleeding and vaginal discharge  Musculoskeletal: Negative for arthralgias, myalgias, neck pain and neck stiffness     Skin: Negative for color change, pallor, rash and wound  Allergic/Immunologic: Negative  Neurological: Negative for dizziness, tremors, syncope, weakness, light-headedness, numbness and headaches  Hematological: Negative  Psychiatric/Behavioral: Negative  All other systems reviewed and are negative  Physical Exam  Physical Exam   Constitutional: She is oriented to person, place, and time  She appears well-developed and well-nourished  Non-toxic appearance  She does not have a sickly appearance  She does not appear ill  No distress  HENT:   Head: Normocephalic and atraumatic  Eyes: Pupils are equal, round, and reactive to light  EOM and lids are normal    Neck: Normal range of motion  Neck supple  Cardiovascular: Normal rate, regular rhythm, S1 normal, S2 normal, normal heart sounds, intact distal pulses and normal pulses  Exam reveals no gallop, no distant heart sounds, no friction rub and no decreased pulses  No murmur heard  Pulses:       Radial pulses are 2+ on the right side, and 2+ on the left side  Pulmonary/Chest: Effort normal and breath sounds normal  No accessory muscle usage  No apnea, no tachypnea and no bradypnea  No respiratory distress  She has no decreased breath sounds  She has no wheezes  She has no rhonchi  She has no rales  Abdominal: Soft  Normal appearance  She exhibits no distension  There is tenderness (right flank) in the right upper quadrant and right lower quadrant  There is no rigidity, no rebound and no guarding  Musculoskeletal: Normal range of motion  She exhibits no edema, tenderness or deformity  Neurological: She is alert and oriented to person, place, and time  No cranial nerve deficit  GCS eye subscore is 4  GCS verbal subscore is 5  GCS motor subscore is 6  GCS 15  AAOx3  Ambulating in department without difficulty  CN II-XII grossly intact  No focal neuro deficits  Skin: Skin is warm, dry and intact  No rash noted  She is not diaphoretic  No erythema   No pallor  Psychiatric: Her speech is normal    Nursing note and vitals reviewed        Vital Signs  ED Triage Vitals   Temperature Pulse Respirations Blood Pressure SpO2   08/03/19 1432 08/03/19 1432 08/03/19 1432 08/03/19 1432 08/03/19 1432   99 2 °F (37 3 °C) 101 18 144/70 96 %      Temp Source Heart Rate Source Patient Position - Orthostatic VS BP Location FiO2 (%)   08/03/19 1432 08/03/19 1432 08/03/19 1432 08/03/19 1432 --   Oral Monitor Sitting Left arm       Pain Score       08/03/19 1623       5           Vitals:    08/03/19 1630 08/03/19 1700 08/03/19 1730 08/03/19 1800   BP: 128/70 119/74 108/64 107/65   Pulse: 90 91 90 94   Patient Position - Orthostatic VS: Lying  Lying Lying         Visual Acuity      ED Medications  Medications   sodium chloride 0 9 % bolus 1,000 mL (0 mL Intravenous Stopped 8/3/19 1741)   ketorolac (TORADOL) injection 15 mg (15 mg Intravenous Given 8/3/19 1623)   iohexol (OMNIPAQUE) 350 MG/ML injection (MULTI-DOSE) 100 mL (100 mL Intravenous Given 8/3/19 1719)       Diagnostic Studies  Results Reviewed     Procedure Component Value Units Date/Time    Comprehensive metabolic panel [[id number]] Collected:  08/03/19 1618    Lab Status:  Final result Specimen:  Blood from Arm, Right Updated:  08/03/19 1643     Sodium 139 mmol/L      Potassium 4 2 mmol/L      Chloride 103 mmol/L      CO2 27 mmol/L      ANION GAP 9 mmol/L      BUN 12 mg/dL      Creatinine 0 70 mg/dL      Glucose 80 mg/dL      Calcium 9 8 mg/dL      AST 13 U/L      ALT 17 U/L      Alkaline Phosphatase 98 U/L      Total Protein 8 1 g/dL      Albumin 3 8 g/dL      Total Bilirubin 0 30 mg/dL      eGFR 125 ml/min/1 73sq m     Narrative:       National Kidney Disease Foundation guidelines for Chronic Kidney Disease (CKD):     Stage 1 with normal or high GFR (GFR > 90 mL/min/1 73 square meters)    Stage 2 Mild CKD (GFR = 60-89 mL/min/1 73 square meters)    Stage 3A Moderate CKD (GFR = 45-59 mL/min/1 73 square meters)    Stage 3B Moderate CKD (GFR = 30-44 mL/min/1 73 square meters)    Stage 4 Severe CKD (GFR = 15-29 mL/min/1 73 square meters)    Stage 5 End Stage CKD (GFR <15 mL/min/1 73 square meters)  Note: GFR calculation is accurate only with a steady state creatinine    Lipase [[id number]]  (Normal) Collected:  08/03/19 1618    Lab Status:  Final result Specimen:  Blood from Arm, Right Updated:  08/03/19 1643     Lipase 114 u/L     D-Dimer [[id number]]  (Normal) Collected:  08/03/19 1619    Lab Status:  Final result Specimen:  Blood from Arm, Right Updated:  08/03/19 1643     D-Dimer, Quant <270 ng/ml (FEU)     Urine Microscopic [[id number]]  (Abnormal) Collected:  08/03/19 1551    Lab Status:  Final result Specimen:  Urine, Clean Catch Updated:  08/03/19 1625     RBC, UA None Seen /hpf      WBC, UA 2-4 /hpf      Epithelial Cells Moderate /hpf      Bacteria, UA None Seen /hpf     CBC and differential [[id number]]  (Abnormal) Collected:  08/03/19 1618    Lab Status:  Final result Specimen:  Blood from Arm, Right Updated:  08/03/19 1623     WBC 10 58 Thousand/uL      RBC 5 19 Million/uL      Hemoglobin 13 2 g/dL      Hematocrit 41 3 %      MCV 80 fL      MCH 25 4 pg      MCHC 32 0 g/dL      RDW 13 9 %      MPV 10 8 fL      Platelets 768 Thousands/uL      nRBC 0 /100 WBCs      Neutrophils Relative 50 %      Immat GRANS % 0 %      Lymphocytes Relative 36 %      Monocytes Relative 7 %      Eosinophils Relative 6 %      Basophils Relative 1 %      Neutrophils Absolute 5 26 Thousands/µL      Immature Grans Absolute 0 03 Thousand/uL      Lymphocytes Absolute 3 80 Thousands/µL      Monocytes Absolute 0 77 Thousand/µL      Eosinophils Absolute 0 66 Thousand/µL      Basophils Absolute 0 06 Thousands/µL     UA w Reflex to Microscopic w Reflex to Culture [[id number]]  (Abnormal) Collected:  08/03/19 1551    Lab Status:  Final result Specimen:  Urine, Clean Catch Updated:  08/03/19 1558     Color, UA Yellow     Clarity, UA Clear     Specific [place], UA >=1 030     pH, UA 6 0     Leukocytes, UA Trace     Nitrite, UA Negative     Protein, UA Negative mg/dl      Glucose, UA Negative mg/dl      Ketones, UA Negative mg/dl      Urobilinogen, UA 0 2 E U /dl      Bilirubin, UA Negative     Blood, UA Negative    POCT pregnancy, urine [[id number]]  (Normal) Resulted:  08/03/19 1554    Lab Status:  Final result Updated:  08/03/19 1554     EXT PREG TEST UR (Ref: Negative) negative     Control valid                 CT abdomen pelvis with contrast   Final Result by Carlos Alfaro DO (08/03 1751)      No acute intra-abdominal abnormality  Workstation performed: [id number]                    Procedures  Procedures       ED Course                               MDM  Number of Diagnoses or Management Options  Right flank pain: new and requires workup  Diagnosis management comments: ddx includes but is not limited to PUD, gastritis, gastroenteritis, cholecystitis, nephrolithiasis, uti/pyelo, appendicitis, doubt cardiac etiology, doubt PE  Plan: pleuritic flank pain with tachycardia, on oral contraceptive  Low suspicion for PE  Will dimer  CT a/p  UA and preg  dispo pending  Amount and/or Complexity of Data Reviewed  Clinical lab tests: reviewed and ordered  Tests in the radiology section of CPT®: ordered and reviewed  Independent visualization of images, tracings, or specimens: yes    Risk of Complications, Morbidity, and/or Mortality  Presenting problems: moderate  Management options: low  General comments: 18 yo female with flank pain  Workup unremarkable  ddimer obtained given pleuritic pain and oral contraceptive  Negative dimer  CT a/p unremarkable  UA negative  Could be musculoskeletal pain  Now tolerating PO  Recommended close follow up with PCP  Her pain is now improved  Return parameters provided  Pt understands and agrees with plan        Patient Progress  Patient progress: stable      Disposition  Final diagnoses:   Right flank pain     Time reflects when diagnosis was documented in both MDM as applicable and the Disposition within this note     Time User Action Codes Description Comment    8/3/2019  6:22 PM Mary Castelan Add [R10 9] Right flank pain       ED Disposition     ED Disposition Condition Date/Time Comment    Discharge Stable Sat Aug 3, 2019  6:22 PM Desire Shelton discharge to home/self care  Follow-up Information     Follow up With Specialties Details Why Contact Info    Jay Sandoval MD [facility] Medicine Call   [place] [id number]  [address]  [facility]  [phone]            Patient's Medications   Discharge Prescriptions    ONDANSETRON (ZOFRAN-ODT) 4 MG DISINTEGRATING TABLET    Take 1 tablet (4 mg total) by mouth every 8 (eight) hours as needed for nausea or vomiting       Start Date: 8/3/2019  End Date: --       Order Dose: 4 mg       Quantity: 15 tablet    Refills: 0     No discharge procedures on file      ED Provider  Electronically Signed by           Isis Holman PA-C  08/03/19 9667

## 2019-10-01 ENCOUNTER — OFFICE VISIT (OUTPATIENT)
Dept: URGENT CARE | Facility: CLINIC | Age: 19
End: 2019-10-01
Payer: COMMERCIAL

## 2019-10-01 VITALS
HEART RATE: 105 BPM | BODY MASS INDEX: 39.1 KG/M2 | OXYGEN SATURATION: 98 % | SYSTOLIC BLOOD PRESSURE: 146 MMHG | HEIGHT: 69 IN | WEIGHT: 264 LBS | TEMPERATURE: 98.8 F | RESPIRATION RATE: 18 BRPM | DIASTOLIC BLOOD PRESSURE: 69 MMHG

## 2019-10-01 DIAGNOSIS — J06.9 UPPER RESPIRATORY TRACT INFECTION, UNSPECIFIED TYPE: ICD-10-CM

## 2019-10-01 DIAGNOSIS — J02.9 SORE THROAT: Primary | ICD-10-CM

## 2019-10-01 LAB — S PYO AG THROAT QL: NEGATIVE

## 2019-10-01 PROCEDURE — 87880 STREP A ASSAY W/OPTIC: CPT | Performed by: PHYSICIAN ASSISTANT

## 2019-10-01 PROCEDURE — 99203 OFFICE O/P NEW LOW 30 MIN: CPT | Performed by: PHYSICIAN ASSISTANT

## 2019-10-01 PROCEDURE — 87070 CULTURE OTHR SPECIMN AEROBIC: CPT | Performed by: PHYSICIAN ASSISTANT

## 2019-10-01 RX ORDER — CITALOPRAM 40 MG/1
TABLET ORAL
COMMUNITY
End: 2019-10-01

## 2019-10-01 RX ORDER — OMEPRAZOLE 20 MG/1
CAPSULE, DELAYED RELEASE ORAL
COMMUNITY
Start: 2018-03-07 | End: 2020-04-09

## 2019-10-01 RX ORDER — FERROUS SULFATE 325(65) MG
TABLET ORAL
COMMUNITY
Start: 2018-03-08 | End: 2020-04-09

## 2019-10-01 RX ORDER — DESOGESTREL AND ETHINYL ESTRADIOL 0.15-0.03
1 KIT ORAL DAILY
COMMUNITY
End: 2020-04-09

## 2019-10-01 NOTE — LETTER
October 1, 2019     Patient: Ezio Han   YOB: 2000   Date of Visit: 10/1/2019       To Whom It May Concern: It is my medical opinion that Jun Thomas may return to work on 10/2/19  If you have any questions or concerns, please don't hesitate to call           Sincerely,        Makayla Oconnell PA-C    CC: No Recipients

## 2019-10-01 NOTE — PATIENT INSTRUCTIONS
1  Sore throat/Upper respiratory infection  -Rapid strep is negative- throat culture is pending  -Increase fluids  -Tylenol/motrin  -Salt H20 gargles/throat lozenges  -Saline nasal spray  -Try using humidifier at nighttime    -Follow-up with PCP within 5 days  -Go to ER with worsening symptoms, difficulty breathing, high fever, or any signs of distress

## 2019-10-01 NOTE — PROGRESS NOTES
[facility]        NAME: Mireya Corado is a 19 y o  female  : 2000    MRN: [id number]  DATE: 2019  TIME: 6:43 PM    Assessment and Plan   Sore throat [J02 9]  1  Sore throat  POCT rapid strepA    Throat culture   2  Upper respiratory tract infection, unspecified type           Patient Instructions     1  Sore throat/Upper respiratory infection  -Rapid strep is negative- throat culture is pending  -Increase fluids  -Tylenol/motrin  -Salt H20 gargles/throat lozenges  -Saline nasal spray  -Try using humidifier at nighttime    -Follow-up with PCP within 5 days  -Go to ER with worsening symptoms, difficulty breathing, high fever, or any signs of distress    Chief Complaint     Chief Complaint   Patient presents with    Sinusitis     started yesterday     Sore Throat         History of Present Illness       The patient presents today for an evaluation of cold symptoms that started yesterday  The patient states that she started with a sore throat yesterday and now has a runny nose as well  Patient states that yesterday she felt as though she was swallowing razor blades  The patient rates her sore throat as a 5/10  The patient had a fever of 101 2 earlier today  The patient took aspirin around 12 today  Review of Systems   Review of Systems   Constitutional: Positive for fever  HENT: Positive for rhinorrhea and sore throat  Negative for ear pain, sinus pressure and sneezing  Respiratory: Negative for shortness of breath  Cardiovascular: Negative for chest pain  Musculoskeletal: Negative for arthralgias  Neurological: Negative for headaches  All other systems reviewed and are negative          Current Medications       Current Outpatient Medications:     citalopram (CeleXA) 40 mg tablet, Take 40 mg by mouth daily, Disp: , Rfl:     desogestrel-ethinyl estradiol (APRI) 0 15-30 MG-MCG per tablet, Take 1 tablet by mouth daily, Disp: , Rfl:     ferrous sulfate 325 (65 Fe) mg tablet, TAKE 1 TABLET 3 TIMES A DAY BY ORAL ROUTE , Disp: , Rfl:     omeprazole (PriLOSEC) 20 mg delayed release capsule, TAKE ONE CAPSULE BY MOUTH TWICE A DAY BEFORE MEALS, Disp: , Rfl:     albuterol (PROVENTIL HFA,VENTOLIN HFA) 90 mcg/act inhaler, 2 puff(s), Disp: , Rfl:     drospirenone-ethinyl estradiol (BRIAN) 3-0 03 MG per tablet, Take 1 tablet by mouth daily (Patient not taking: Reported on 10/1/2019), Disp: 28 tablet, Rfl: 11    ondansetron (ZOFRAN-ODT) 4 mg disintegrating tablet, Take 1 tablet (4 mg total) by mouth every 8 (eight) hours as needed for nausea or vomiting (Patient not taking: Reported on 10/1/2019), Disp: 15 tablet, Rfl: 0    Current Allergies     Allergies as of 10/01/2019 - Reviewed 10/01/2019   Allergen Reaction Noted    Zolpidem Hallucinations and Other (See Comments)             The following portions of the patient's history were reviewed and updated as appropriate: allergies, current medications, past family history, past medical history, past social history, past surgical history and problem list      Past Medical History:   Diagnosis Date    Depression     Psychiatric disorder     [facility]        History reviewed  No pertinent surgical history  Family History   Problem Relation Age of Onset    Leukemia Mother          Medications have been verified  Objective   /69   Pulse 105   Temp 98 8 °F (37 1 °C) (Tympanic)   Resp 18   Ht 5' 9" (1 753 m)   Wt 120 kg (264 lb)   SpO2 98%   BMI 38 99 kg/m²        Physical Exam     Physical Exam   Constitutional: She is oriented to person, place, and time  She appears well-developed and well-nourished  No distress  HENT:   Head: Normocephalic and atraumatic     Right Ear: Hearing, tympanic membrane, external ear and ear canal normal    Left Ear: Hearing, tympanic membrane, external ear and ear canal normal    Nose: Nose normal    Mouth/Throat: Uvula is midline and mucous membranes are normal  Posterior oropharyngeal erythema present  No oropharyngeal exudate, posterior oropharyngeal edema or tonsillar abscesses  Tonsils are 1+ on the right  Tonsils are 1+ on the left  No tonsillar exudate  Eyes: Pupils are equal, round, and reactive to light  Conjunctivae and EOM are normal    Neck: Normal range of motion  Neck supple  Cardiovascular: Normal rate, regular rhythm and normal heart sounds  Pulmonary/Chest: Effort normal and breath sounds normal    Lymphadenopathy:     She has no cervical adenopathy  Neurological: She is alert and oriented to person, place, and time  Skin: Skin is warm and dry  Psychiatric: She has a normal mood and affect  Nursing note and vitals reviewed

## 2019-10-03 LAB — BACTERIA THROAT CULT: NORMAL

## 2019-11-06 ENCOUNTER — OFFICE VISIT (OUTPATIENT)
Dept: URGENT CARE | Facility: CLINIC | Age: 19
End: 2019-11-06
Payer: COMMERCIAL

## 2019-11-06 VITALS
WEIGHT: 265 LBS | HEIGHT: 69 IN | TEMPERATURE: 98.5 F | RESPIRATION RATE: 18 BRPM | BODY MASS INDEX: 39.25 KG/M2 | HEART RATE: 94 BPM | SYSTOLIC BLOOD PRESSURE: 122 MMHG | OXYGEN SATURATION: 97 % | DIASTOLIC BLOOD PRESSURE: 86 MMHG
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
## 2019-11-06 DIAGNOSIS — S40.861A TICK BITE OF RIGHT UPPER ARM, INITIAL ENCOUNTER: Primary | ICD-10-CM

## 2019-11-06 PROCEDURE — 99213 OFFICE O/P EST LOW 20 MIN: CPT | Performed by: PHYSICIAN ASSISTANT

## 2019-11-06 RX ORDER — DOXYCYCLINE 100 MG/1
200 TABLET ORAL ONCE
Qty: 2 TABLET | Refills: 0 | Status: SHIPPED | OUTPATIENT
Start: 2019-11-06 | End: 2019-11-06

## 2019-11-06 NOTE — PROGRESS NOTES
[facility]        NAME: Nelad Huang is a 22 y o  female  : 2000    MRN: [id number]  DATE: 2019  TIME: 2:36 PM    Assessment and Plan   Tick bite of right upper arm, initial encounter [S40 861A, W57  XXXA]  1  Tick bite of right upper arm, initial encounter  doxycycline (ADOXA) 100 MG tablet         Patient Instructions     Follow up with PCP in 3-5 days  Proceed to  ER if symptoms worsen  Chief Complaint     Chief Complaint   Patient presents with    Tick Removal     right arm pt noticed tick and attempted to remove however she is unsure if she got the entire tick out         History of Present Illness       27-year-old female presents for tick bite to the right upper extremity  Patient states she noticed a tick was in the woods yesterday  She has no history of Lyme disease  Patient removed tick herself with tweezers  She denies fever chills  Review of Systems   Review of Systems   Constitutional: Negative for chills, fatigue and fever  HENT: Negative for congestion, ear pain, sinus pain, sore throat and trouble swallowing  Eyes: Negative for pain, discharge and redness  Respiratory: Negative for cough, chest tightness, shortness of breath and wheezing  Cardiovascular: Negative for chest pain, palpitations and leg swelling  Gastrointestinal: Negative for abdominal pain, diarrhea, nausea and vomiting  Musculoskeletal: Negative for arthralgias, joint swelling and myalgias  Skin: Positive for wound  Negative for rash  Neurological: Negative for dizziness, weakness, numbness and headaches           Current Medications       Current Outpatient Medications:     citalopram (CeleXA) 40 mg tablet, Take 40 mg by mouth daily, Disp: , Rfl:     albuterol (PROVENTIL HFA,VENTOLIN HFA) 90 mcg/act inhaler, 2 puff(s), Disp: , Rfl:     desogestrel-ethinyl estradiol (APRI) 0 15-30 MG-MCG per tablet, Take 1 tablet by mouth daily, Disp: , Rfl:     doxycycline (ADOXA) 100 MG tablet, Take 2 tablets (200 mg total) by mouth once for 1 dose, Disp: 2 tablet, Rfl: 0    drospirenone-ethinyl estradiol (BRIAN) 3-0 03 MG per tablet, Take 1 tablet by mouth daily (Patient not taking: Reported on 10/1/2019), Disp: 28 tablet, Rfl: 11    ferrous sulfate 325 (65 Fe) mg tablet, TAKE 1 TABLET 3 TIMES A DAY BY ORAL ROUTE , Disp: , Rfl:     omeprazole (PriLOSEC) 20 mg delayed release capsule, TAKE ONE CAPSULE BY MOUTH TWICE A DAY BEFORE MEALS, Disp: , Rfl:     ondansetron (ZOFRAN-ODT) 4 mg disintegrating tablet, Take 1 tablet (4 mg total) by mouth every 8 (eight) hours as needed for nausea or vomiting (Patient not taking: Reported on 10/1/2019), Disp: 15 tablet, Rfl: 0    Current Allergies     Allergies as of 11/06/2019 - Reviewed 11/06/2019   Allergen Reaction Noted    Zolpidem Hallucinations and Other (See Comments)             The following portions of the patient's history were reviewed and updated as appropriate: allergies, current medications, past family history, past medical history, past social history, past surgical history and problem list      Past Medical History:   Diagnosis Date    Depression     Psychiatric disorder     [facility]        History reviewed  No pertinent surgical history  Family History   Problem Relation Age of Onset    Leukemia Mother          Medications have been verified  Objective   /86   Pulse 94   Temp 98 5 °F (36 9 °C) (Temporal)   Resp 18   Ht 5' 9" (1 753 m)   Wt 120 kg (265 lb)   SpO2 97%   BMI 39 13 kg/m²        Physical Exam     Physical Exam   Constitutional: Vital signs are normal  She appears well-developed  No distress  Cardiovascular: Normal rate, regular rhythm, normal heart sounds and intact distal pulses     Pulmonary/Chest: Effort normal and breath sounds normal    Skin:

## 2019-12-28 ENCOUNTER — OFFICE VISIT (OUTPATIENT)
Dept: URGENT CARE | Facility: CLINIC | Age: 19
End: 2019-12-28
Payer: COMMERCIAL

## 2019-12-28 ENCOUNTER — APPOINTMENT (OUTPATIENT)
Dept: RADIOLOGY | Facility: CLINIC | Age: 19
End: 2019-12-28
Payer: COMMERCIAL

## 2019-12-28 VITALS
SYSTOLIC BLOOD PRESSURE: 120 MMHG | HEART RATE: 97 BPM | OXYGEN SATURATION: 98 % | DIASTOLIC BLOOD PRESSURE: 80 MMHG | TEMPERATURE: 98.4 F | RESPIRATION RATE: 16 BRPM

## 2019-12-28 DIAGNOSIS — S99.921A INJURY OF RIGHT FOOT, INITIAL ENCOUNTER: ICD-10-CM

## 2019-12-28 DIAGNOSIS — S92.911A UNSPECIFIED FRACTURE OF RIGHT TOE(S), INITIAL ENCOUNTER FOR CLOSED FRACTURE: Primary | ICD-10-CM

## 2019-12-28 PROCEDURE — 73630 X-RAY EXAM OF FOOT: CPT

## 2019-12-28 PROCEDURE — 99213 OFFICE O/P EST LOW 20 MIN: CPT | Performed by: PHYSICIAN ASSISTANT

## 2019-12-28 NOTE — PATIENT INSTRUCTIONS
We will place her in a postop shoe  Keep the foot elevated  Ice the foot  Tylenol for pain  Follow-up with Orthopedics

## 2019-12-28 NOTE — PROGRESS NOTES
[facility]        NAME: Sunita Draper is a 22 y o  female  : 2000    MRN: [id number]  DATE: 2019  TIME: 3:49 PM    Assessment and Plan   Injury of right foot, initial encounter [P39 921A]  1  Injury of right foot, initial encounter  XR foot 3+ vw right         Patient Instructions   There are no Patient Instructions on file for this visit  Follow up with PCP in 3-5 days  Proceed to  ER if symptoms worsen  Chief Complaint     Chief Complaint   Patient presents with    Toe Pain     R pinky toe pain         History of Present Illness       64-year-old female presents to the clinic with right foot and small toe pain for 1 day  She fell down 2 steps last night  She has pain with walking weight-bearing  No numbness or tingling  No treatment at home  Review of Systems   Review of Systems   Constitutional: Negative  HENT: Negative  Respiratory: Negative  Cardiovascular: Negative  Gastrointestinal: Negative  Musculoskeletal: Positive for arthralgias           Current Medications       Current Outpatient Medications:     citalopram (CeleXA) 40 mg tablet, Take 40 mg by mouth daily, Disp: , Rfl:     lurasidone (LATUDA) 20 mg tablet, Take 20 mg by mouth daily with breakfast, Disp: , Rfl:     albuterol (PROVENTIL HFA,VENTOLIN HFA) 90 mcg/act inhaler, 2 puff(s), Disp: , Rfl:     desogestrel-ethinyl estradiol (APRI) 0 15-30 MG-MCG per tablet, Take 1 tablet by mouth daily, Disp: , Rfl:     drospirenone-ethinyl estradiol (BRIAN) 3-0 03 MG per tablet, Take 1 tablet by mouth daily (Patient not taking: Reported on 10/1/2019), Disp: 28 tablet, Rfl: 11    ferrous sulfate 325 (65 Fe) mg tablet, TAKE 1 TABLET 3 TIMES A DAY BY ORAL ROUTE , Disp: , Rfl:     omeprazole (PriLOSEC) 20 mg delayed release capsule, TAKE ONE CAPSULE BY MOUTH TWICE A DAY BEFORE MEALS, Disp: , Rfl:     ondansetron (ZOFRAN-ODT) 4 mg disintegrating tablet, Take 1 tablet (4 mg total) by mouth every 8 (eight) hours as needed for nausea or vomiting (Patient not taking: Reported on 10/1/2019), Disp: 15 tablet, Rfl: 0    Current Allergies     Allergies as of 12/28/2019 - Reviewed 11/06/2019   Allergen Reaction Noted    Zolpidem Hallucinations and Other (See Comments)             The following portions of the patient's history were reviewed and updated as appropriate: allergies, current medications, past family history, past medical history, past social history, past surgical history and problem list      Past Medical History:   Diagnosis Date    Depression     Psychiatric disorder     Schizophrenia ([facility] [address] )        History reviewed  No pertinent surgical history  Family History   Problem Relation Age of Onset    Leukemia Mother          Medications have been verified  Objective   /80   Pulse 97   Temp 98 4 °F (36 9 °C) (Temporal)   Resp 16   SpO2 98%        Physical Exam     Physical Exam   Constitutional: She is oriented to person, place, and time  She appears well-developed and well-nourished  No distress  Musculoskeletal:   Right foot bruise and swollen over the lateral foot  Bruising along the 5th and 4th MTP joints  Painful range of motion of the 4th and 5th toes  No tenderness over the 5th metatarsal    Neurological: She is alert and oriented to person, place, and time

## 2020-02-04 ENCOUNTER — HOSPITAL ENCOUNTER (EMERGENCY)
Facility: HOSPITAL | Age: 20
Discharge: HOME | End: 2020-02-04
Attending: EMERGENCY MEDICINE
Payer: COMMERCIAL

## 2020-02-04 ENCOUNTER — APPOINTMENT (EMERGENCY)
Dept: RADIOLOGY | Facility: HOSPITAL | Age: 20
End: 2020-02-04
Payer: COMMERCIAL

## 2020-02-04 VITALS
BODY MASS INDEX: 41.9 KG/M2 | TEMPERATURE: 98.4 F | WEIGHT: 283.73 LBS | SYSTOLIC BLOOD PRESSURE: 138 MMHG | RESPIRATION RATE: 18 BRPM | HEART RATE: 97 BPM | OXYGEN SATURATION: 96 % | DIASTOLIC BLOOD PRESSURE: 82 MMHG

## 2020-02-04 DIAGNOSIS — R06.00 DYSPNEA: Primary | ICD-10-CM

## 2020-02-04 PROCEDURE — 99285 EMERGENCY DEPT VISIT HI MDM: CPT

## 2020-02-04 PROCEDURE — 71046 X-RAY EXAM CHEST 2 VIEWS: CPT

## 2020-02-04 PROCEDURE — 99284 EMERGENCY DEPT VISIT MOD MDM: CPT | Performed by: EMERGENCY MEDICINE

## 2020-02-05 NOTE — ED PROVIDER NOTES
History  Chief Complaint   Patient presents with    Shortness of Breath     Pt presents to ED via EMS after feeling SOB and sore throat post cleaning with amonia x15min  Pt stated she was not wearing a mask while cleaning     Pt comes to ED c/o sudden onset mild to moderate dyspnea which began while she was cleaning in an enclosed area with ammonia  She notes her sxs have almost entirely resolved by the time of presentation  She denies cp, cough, fever, syncope, hemoptysis, leg pain and swelling and h/o vte  She denies cardiac and pulmonary medical history  Prior to Admission Medications   Prescriptions Last Dose Informant Patient Reported? Taking? albuterol (PROVENTIL HFA,VENTOLIN HFA) 90 mcg/act inhaler   Yes No   Si puff(s)   citalopram (CeleXA) 40 mg tablet   Yes No   Sig: Take 40 mg by mouth daily   desogestrel-ethinyl estradiol (APRI) 0 15-30 MG-MCG per tablet   Yes No   Sig: Take 1 tablet by mouth daily   drospirenone-ethinyl estradiol (BRIAN) 3-0 03 MG per tablet   No No   Sig: Take 1 tablet by mouth daily   Patient not taking: Reported on 10/1/2019   ferrous sulfate 325 (65 Fe) mg tablet   Yes No   Sig: TAKE 1 TABLET 3 TIMES A DAY BY ORAL ROUTE    lurasidone (LATUDA) 20 mg tablet   Yes No   Sig: Take 20 mg by mouth daily with breakfast   omeprazole (PriLOSEC) 20 mg delayed release capsule   Yes No   Sig: TAKE ONE CAPSULE BY MOUTH TWICE A DAY BEFORE MEALS   ondansetron (ZOFRAN-ODT) 4 mg disintegrating tablet   No No   Sig: Take 1 tablet (4 mg total) by mouth every 8 (eight) hours as needed for nausea or vomiting   Patient not taking: Reported on 10/1/2019      Facility-Administered Medications: None       Past Medical History:   Diagnosis Date    Depression     Psychiatric disorder     Schizophrenia ([facility] [address] )        History reviewed  No pertinent surgical history      Family History   Problem Relation Age of Onset    Leukemia Mother      I have reviewed and agree with the history as documented  Social History     Tobacco Use    Smoking status: Never Smoker    Smokeless tobacco: Never Used   Substance Use Topics    Alcohol use: No    Drug use: No        Review of Systems   Constitutional: Negative for chills and fever  HENT: Positive for sore throat  Respiratory: Positive for shortness of breath  Negative for chest tightness  All other systems reviewed and are negative  Physical Exam  Physical Exam   Constitutional: She is oriented to person, place, and time  She appears well-developed and well-nourished  No distress  HENT:   Head: Normocephalic and atraumatic  Mouth/Throat: No oropharyngeal exudate  Eyes: Pupils are equal, round, and reactive to light  Conjunctivae and EOM are normal    Neck: Normal range of motion  Neck supple  No JVD present  Cardiovascular: Normal rate, regular rhythm, normal heart sounds and intact distal pulses  Pulmonary/Chest: Effort normal and breath sounds normal  No stridor  No respiratory distress  She has no wheezes  She has no rales  She exhibits no tenderness  Musculoskeletal: Normal range of motion  She exhibits no edema, tenderness or deformity  Neurological: She is alert and oriented to person, place, and time  No cranial nerve deficit or sensory deficit  She exhibits normal muscle tone  Coordination normal    Skin: Skin is warm and dry  Capillary refill takes less than 2 seconds  No rash noted  She is not diaphoretic  No erythema  No pallor  Nursing note and vitals reviewed        Vital Signs  ED Triage Vitals [02/04/20 1944]   Temperature Pulse Respirations Blood Pressure SpO2   98 4 °F (36 9 °C) 97 18 138/82 96 %      Temp Source Heart Rate Source Patient Position - Orthostatic VS BP Location FiO2 (%)   Oral Monitor -- Right arm --      Pain Score       No Pain           Vitals:    02/04/20 1944   BP: 138/82   Pulse: 97         Visual Acuity      ED Medications  Medications - No data to display    Diagnostic Studies  Results Reviewed     None                 XR chest 2 views   ED Interpretation by Sophie Contreras MD (02/04 2003)   No acute process  Procedures  Procedures         ED Course                               MDM      Disposition  Final diagnoses:   Dyspnea     Time reflects when diagnosis was documented in both MDM as applicable and the Disposition within this note     Time User Action Codes Description Comment    2/4/2020  8:03 PM Claudette Mitchell Add [R06 00] Dyspnea       ED Disposition     ED Disposition Condition Date/Time Comment    Discharge Stable Tue Feb 4, 2020  8:03 PM Charmayne Sake discharge to home/self care  Follow-up Information     Follow up With Specialties Details Why Contact Info Additional Information    [address] [facility] Emergency Department Emergency Medicine  If symptoms worsen [address] [facility] ED, [address] [place] [address], [facility]          Patient's Medications   Discharge Prescriptions    No medications on file     No discharge procedures on file      ED Provider  Electronically Signed by           Sophie Contreras MD  02/04/20 2009

## 2020-04-09 ENCOUNTER — TELEMEDICINE (OUTPATIENT)
Dept: OBGYN CLINIC | Facility: CLINIC | Age: 20
End: 2020-04-09
Payer: COMMERCIAL

## 2020-04-09 DIAGNOSIS — R63.5 WEIGHT GAIN: ICD-10-CM

## 2020-04-09 DIAGNOSIS — N92.6 IRREGULAR MENSES: Primary | ICD-10-CM

## 2020-04-09 DIAGNOSIS — Z30.41 ENCOUNTER FOR SURVEILLANCE OF CONTRACEPTIVE PILLS: ICD-10-CM

## 2020-04-09 PROCEDURE — 99214 OFFICE O/P EST MOD 30 MIN: CPT | Performed by: PHYSICIAN ASSISTANT

## 2020-04-09 RX ORDER — CLONIDINE HYDROCHLORIDE 0.1 MG/1
TABLET ORAL
COMMUNITY
Start: 2020-04-06 | End: 2021-06-30

## 2020-04-09 RX ORDER — DROSPIRENONE AND ETHINYL ESTRADIOL 0.03MG-3MG
1 KIT ORAL DAILY
Qty: 90 TABLET | Refills: 3 | Status: SHIPPED | OUTPATIENT
Start: 2020-04-09 | End: 2021-02-12

## 2020-04-09 RX ORDER — LURASIDONE HYDROCHLORIDE 40 MG/1
TABLET, FILM COATED ORAL
COMMUNITY
Start: 2020-04-06 | End: 2021-09-02 | Stop reason: SDUPTHER

## 2020-06-02 LAB — HBA1C MFR BLD HPLC: 5.6 %

## 2020-06-05 DIAGNOSIS — E88.81 INSULIN RESISTANCE: Primary | ICD-10-CM

## 2020-06-30 ENCOUNTER — ANNUAL EXAM (OUTPATIENT)
Dept: OBGYN CLINIC | Facility: CLINIC | Age: 20
End: 2020-06-30
Payer: COMMERCIAL

## 2020-06-30 VITALS
TEMPERATURE: 98 F | WEIGHT: 285 LBS | SYSTOLIC BLOOD PRESSURE: 130 MMHG | DIASTOLIC BLOOD PRESSURE: 80 MMHG | BODY MASS INDEX: 42.09 KG/M2

## 2020-06-30 DIAGNOSIS — E88.81 INSULIN RESISTANCE: ICD-10-CM

## 2020-06-30 DIAGNOSIS — N94.6 DYSMENORRHEA: ICD-10-CM

## 2020-06-30 DIAGNOSIS — N92.6 IRREGULAR MENSES: ICD-10-CM

## 2020-06-30 DIAGNOSIS — Z01.419 ROUTINE GYNECOLOGICAL EXAMINATION: Primary | ICD-10-CM

## 2020-06-30 DIAGNOSIS — N92.1 MENORRHAGIA WITH IRREGULAR CYCLE: ICD-10-CM

## 2020-06-30 PROCEDURE — G0124 SCREEN C/V THIN LAYER BY MD: HCPCS | Performed by: PATHOLOGY

## 2020-06-30 PROCEDURE — 87624 HPV HI-RISK TYP POOLED RSLT: CPT | Performed by: PHYSICIAN ASSISTANT

## 2020-06-30 PROCEDURE — 99395 PREV VISIT EST AGE 18-39: CPT | Performed by: PHYSICIAN ASSISTANT

## 2020-06-30 PROCEDURE — G0145 SCR C/V CYTO,THINLAYER,RESCR: HCPCS | Performed by: PATHOLOGY

## 2020-07-06 LAB
LAB AP GYN PRIMARY INTERPRETATION: ABNORMAL
LAB AP LMP: ABNORMAL
Lab: ABNORMAL
PATH INTERP SPEC-IMP: ABNORMAL

## 2020-07-08 ENCOUNTER — HOSPITAL ENCOUNTER (OUTPATIENT)
Dept: ULTRASOUND IMAGING | Facility: HOSPITAL | Age: 20
Discharge: HOME | End: 2020-07-08
Attending: PHYSICIAN ASSISTANT
Payer: COMMERCIAL

## 2020-07-08 DIAGNOSIS — N94.6 DYSMENORRHEA: ICD-10-CM

## 2020-07-08 DIAGNOSIS — N92.1 MENORRHAGIA WITH IRREGULAR CYCLE: ICD-10-CM

## 2020-07-08 DIAGNOSIS — N92.6 IRREGULAR MENSES: ICD-10-CM

## 2020-07-08 LAB
HPV HR 12 DNA CVX QL NAA+PROBE: NEGATIVE
HPV16 DNA CVX QL NAA+PROBE: NEGATIVE
HPV18 DNA CVX QL NAA+PROBE: NEGATIVE

## 2020-07-08 PROCEDURE — 76830 TRANSVAGINAL US NON-OB: CPT

## 2020-07-08 PROCEDURE — 76856 US EXAM PELVIC COMPLETE: CPT

## 2020-07-09 ENCOUNTER — TELEPHONE (OUTPATIENT)
Dept: OBGYN CLINIC | Facility: CLINIC | Age: 20
End: 2020-07-09

## 2020-07-20 ENCOUNTER — TELEPHONE (OUTPATIENT)
Dept: OBGYN CLINIC | Facility: CLINIC | Age: 20
End: 2020-07-20

## 2020-07-20 NOTE — TELEPHONE ENCOUNTER
Patient called for results of pap  After reviewed results patient expressed she had experience nausea and vomiting last night with abdominal cramping originating in the morning on 7/19/20  Patient said she had a fever of 101  She no longer has nausea, vomiting, or fever  Patient was advised to reach out to her PCP for evaluation

## 2020-09-02 ENCOUNTER — TELEPHONE (OUTPATIENT)
Dept: OBGYN CLINIC | Facility: CLINIC | Age: 20
End: 2020-09-02

## 2020-09-02 NOTE — TELEPHONE ENCOUNTER
There are no restrictions secondary to her PCOS  I would recommend calling the patient to find out what this is for

## 2020-09-02 NOTE — TELEPHONE ENCOUNTER
Pts mother dropped of a form to be completed for her job at [employer]  Form is an accomodation form  Mom states is for her daughter having Polycystic ovary syndrome  Can you review

## 2020-09-02 NOTE — TELEPHONE ENCOUNTER
Pts mom Meliaz Navarrete) answered the phone and would not give phone to pt (pt may be uncomfortable asking)  Mom states daughters HR department would like form completed for when pt calls out of work for the first day of her cycle  Told mom that I will review with Janessa Mitchell but this is not something we routinely do for pts with PCOS

## 2020-09-02 NOTE — TELEPHONE ENCOUNTER
Can give her FMLA intermittent for first day of menses only for the next 6 months for dysmenorrhea until we are able to better control her menses on OCP  Patient started a new pill in June 2020, will need a pill check at end of September beginning of October to reevaluate

## 2020-09-04 ENCOUNTER — TELEPHONE (OUTPATIENT)
Dept: OBGYN CLINIC | Facility: CLINIC | Age: 20
End: 2020-09-04

## 2020-09-04 NOTE — TELEPHONE ENCOUNTER
Pt called returning your call and asking regarding paperwork for work for accommodations and needs to make an appt

## 2020-10-05 ENCOUNTER — TELEPHONE (OUTPATIENT)
Dept: OBGYN CLINIC | Facility: CLINIC | Age: 20
End: 2020-10-05

## 2020-12-07 ENCOUNTER — OFFICE VISIT (OUTPATIENT)
Dept: URGENT CARE | Facility: CLINIC | Age: 20
End: 2020-12-07
Payer: COMMERCIAL

## 2020-12-07 VITALS
HEIGHT: 69 IN | RESPIRATION RATE: 18 BRPM | HEART RATE: 90 BPM | OXYGEN SATURATION: 97 % | TEMPERATURE: 97.7 F | BODY MASS INDEX: 42.65 KG/M2 | WEIGHT: 288 LBS

## 2020-12-07 DIAGNOSIS — R05.9 COUGH: Primary | ICD-10-CM

## 2020-12-07 DIAGNOSIS — R05.9 COUGH: ICD-10-CM

## 2020-12-07 PROCEDURE — U0003 INFECTIOUS AGENT DETECTION BY NUCLEIC ACID (DNA OR RNA); SEVERE ACUTE RESPIRATORY SYNDROME CORONAVIRUS 2 (SARS-COV-2) (CORONAVIRUS DISEASE [COVID-19]), AMPLIFIED PROBE TECHNIQUE, MAKING USE OF HIGH THROUGHPUT TECHNOLOGIES AS DESCRIBED BY CMS-2020-01-R: HCPCS | Performed by: PHYSICIAN ASSISTANT

## 2020-12-07 PROCEDURE — 99213 OFFICE O/P EST LOW 20 MIN: CPT | Performed by: PHYSICIAN ASSISTANT

## 2020-12-09 LAB — SARS-COV-2 RNA SPEC QL NAA+PROBE: NOT DETECTED

## 2021-02-12 ENCOUNTER — TELEPHONE (OUTPATIENT)
Dept: OBGYN CLINIC | Facility: CLINIC | Age: 21
End: 2021-02-12

## 2021-02-12 ENCOUNTER — OFFICE VISIT (OUTPATIENT)
Dept: OBGYN CLINIC | Facility: CLINIC | Age: 21
End: 2021-02-12
Payer: COMMERCIAL

## 2021-02-12 VITALS
HEIGHT: 69 IN | WEIGHT: 293 LBS | SYSTOLIC BLOOD PRESSURE: 132 MMHG | BODY MASS INDEX: 43.4 KG/M2 | DIASTOLIC BLOOD PRESSURE: 82 MMHG

## 2021-02-12 DIAGNOSIS — N63.23 BREAST LUMP ON LEFT SIDE AT 5 O'CLOCK POSITION: ICD-10-CM

## 2021-02-12 DIAGNOSIS — N64.4 BREAST PAIN: ICD-10-CM

## 2021-02-12 DIAGNOSIS — N64.52 BILATERAL NIPPLE DISCHARGE: Primary | ICD-10-CM

## 2021-02-12 PROCEDURE — 99213 OFFICE O/P EST LOW 20 MIN: CPT | Performed by: PHYSICIAN ASSISTANT

## 2021-02-12 RX ORDER — VENLAFAXINE HYDROCHLORIDE 225 MG/1
150 TABLET, EXTENDED RELEASE ORAL DAILY
COMMUNITY
Start: 2021-01-20 | End: 2021-09-02 | Stop reason: SDUPTHER

## 2021-02-12 NOTE — ASSESSMENT & PLAN NOTE
Reviewed bilateral nipple discharge with patient in length  Reviewed possible causes including pregnancy (will take home UPT), prolactinoma, medication induced  Reviewed Salbador Raphael is known to cause hyperprolactinemia  Will plan to get Prolactin level  Aware to fast and no milk products 3 days prior to testing  Also reviewed no intercourse or nipple stimulation night before or morning of testing  Will also plan to get bilateral diagnostic breast ultrasounds secondary to breast pain, nipple discharge, and breast mass in left breast    Office will call with results and appropriate follow up

## 2021-02-12 NOTE — PROGRESS NOTES
Assessment/Plan:    Bilateral nipple discharge  Reviewed bilateral nipple discharge with patient in length  Reviewed possible causes including pregnancy (will take home UPT), prolactinoma, medication induced  Reviewed Fern Cost is known to cause hyperprolactinemia  Will plan to get Prolactin level  Aware to fast and no milk products 3 days prior to testing  Also reviewed no intercourse or nipple stimulation night before or morning of testing  Will also plan to get bilateral diagnostic breast ultrasounds secondary to breast pain, nipple discharge, and breast mass in left breast    Office will call with results and appropriate follow up  Problem List Items Addressed This Visit        Other    Bilateral nipple discharge - Primary     Reviewed bilateral nipple discharge with patient in length  Reviewed possible causes including pregnancy (will take home UPT), prolactinoma, medication induced  Reviewed Fern Cost is known to cause hyperprolactinemia  Will plan to get Prolactin level  Aware to fast and no milk products 3 days prior to testing  Also reviewed no intercourse or nipple stimulation night before or morning of testing  Will also plan to get bilateral diagnostic breast ultrasounds secondary to breast pain, nipple discharge, and breast mass in left breast    Office will call with results and appropriate follow up  Relevant Orders    Prolactin    US breast left limited (diagnostic)    US breast right limited (diagnostic)      Other Visit Diagnoses     Breast pain        Relevant Orders    Prolactin    US breast left limited (diagnostic)    US breast right limited (diagnostic)    Breast lump on left side at 5 o'clock position        Relevant Orders    US breast left limited (diagnostic)            Subjective:      Patient ID: Kishan Mar is a 23 y o  female  HPI   24 yo seen for bilateral nipple discharge and breast pain  Reports started about 2 weeks ago  Noticed bra was wet   Then noticed in the shower a clear milky discharge out of nipple without expressing  Patient also felt lump in left breast  Both breasts are very tender recently  Started Effexor about a month ago  Patient is also on [place]  Does report hx of migraines  Has been having issues with vision in general, not necessarily only peripheral vision  Has appointment with eye doctor  Currently on Lo ovral OCP for irregular menses and PCOS  LMP last week  Has not taken a pregnancy test    The following portions of the patient's history were reviewed and updated as appropriate:   She  has a past medical history of Depression, Psychiatric disorder, and Schizophrenia ([facility] [address] )  She   Patient Active Problem List    Diagnosis Date Noted    Bilateral nipple discharge 02/12/2021    Routine gynecological examination 06/30/2020    Irregular menses 04/09/2020    Well woman exam 03/13/2019    Nexplanon removal 05/22/2018    Acne vulgaris 05/08/2018    Anxiety state 05/08/2018    Depressive disorder 05/08/2018    Gastroesophageal reflux disease 05/08/2018    Nexplanon insertion 05/08/2018    Anemia 03/01/2018    PMS (premenstrual syndrome) 10/31/2017    Breast abscess 12/02/2016    Depression 12/02/2016    Asthma with severity to be determined 09/10/2011    Acute URI 10/24/2002    Allergic rhinitis 05/01/2002    Impacted cerumen 05/01/2002    Cough 03/09/2002    Pneumonia due to organism 03/09/2002     She  has no past surgical history on file  Her family history includes Leukemia in her mother  She  reports that she has never smoked  She has never used smokeless tobacco  She reports that she does not drink alcohol or use drugs    Current Outpatient Medications   Medication Sig Dispense Refill    albuterol (PROVENTIL HFA,VENTOLIN HFA) 90 mcg/act inhaler 2 puff(s)      cloNIDine (CATAPRES) 0 1 mg tablet       LATUDA 40 MG tablet       norgestrel-ethinyl estradiol (LO/OVRAL) 0 3 mg-30 mcg per tablet Take 1 tablet by mouth daily 28 tablet 2    venlafaxine 225 MG TB24 Take 225 mg by mouth daily      citalopram (CeleXA) 40 mg tablet Take 40 mg by mouth daily       No current facility-administered medications for this visit  She is allergic to zolpidem       Review of Systems   Constitutional: Negative for fatigue, fever and unexpected weight change  HENT: Negative for dental problem and sinus pressure  Eyes: Negative for visual disturbance  Respiratory: Negative for cough, shortness of breath and wheezing  Cardiovascular: Negative for chest pain  Gastrointestinal: Negative for abdominal pain, blood in stool, constipation, diarrhea, nausea and vomiting  Endocrine: Negative for polydipsia  Genitourinary: Negative for difficulty urinating, dyspareunia, dysuria, frequency, hematuria, pelvic pain and urgency  Musculoskeletal: Negative for arthralgias and back pain  Neurological: Negative for dizziness, seizures, light-headedness and headaches  Psychiatric/Behavioral: Negative for suicidal ideas  The patient is not nervous/anxious  Objective:      /82 (BP Location: Left arm, Patient Position: Sitting, Cuff Size: Large)   Ht 5' 9" (1 753 m)   Wt 134 kg (296 lb)   BMI 43 71 kg/m²          Physical Exam  Constitutional:       Appearance: Normal appearance  She is well-developed  Neck:      Musculoskeletal: Neck supple  Thyroid: No thyroid mass or thyromegaly  Cardiovascular:      Rate and Rhythm: Normal rate and regular rhythm  Heart sounds: Normal heart sounds  No murmur  No friction rub  No gallop  Pulmonary:      Effort: Pulmonary effort is normal  No respiratory distress  Breath sounds: Normal breath sounds  No wheezing or rales  Chest:      Breasts:         Right: Tenderness present  No swelling, bleeding, inverted nipple, mass, nipple discharge or skin change           Left: Mass, nipple discharge (able to express small amount of nipple discharge, clear  ) and tenderness present  No swelling, bleeding, inverted nipple or skin change  Abdominal:      General: Bowel sounds are normal  There is no distension  Palpations: Abdomen is soft  There is no mass  Tenderness: There is no abdominal tenderness  There is no guarding or rebound  Genitourinary:     Labia:         Right: No rash, tenderness, lesion or injury  Left: No rash, tenderness, lesion or injury  Vagina: No signs of injury  No vaginal discharge, erythema, tenderness or bleeding  Cervix: No cervical motion tenderness, discharge or friability  Uterus: Not deviated, not enlarged and not tender  Adnexa:         Right: No mass, tenderness or fullness  Left: No mass, tenderness or fullness  Lymphadenopathy:      Head:      Right side of head: No submental, submandibular, tonsillar, preauricular, posterior auricular or occipital adenopathy  Left side of head: No submental, submandibular, tonsillar, preauricular, posterior auricular or occipital adenopathy  Cervical: No cervical adenopathy  Right cervical: No superficial, deep or posterior cervical adenopathy  Left cervical: No superficial, deep or posterior cervical adenopathy  Upper Body:      Right upper body: No supraclavicular or axillary adenopathy  Left upper body: No supraclavicular or axillary adenopathy  Skin:     General: Skin is warm and dry  Coloration: Skin is not pale  Findings: No erythema or rash  Neurological:      Mental Status: She is alert and oriented to person, place, and time  Psychiatric:         Behavior: Behavior normal          Thought Content:  Thought content normal          Judgment: Judgment normal

## 2021-02-12 NOTE — TELEPHONE ENCOUNTER
Pt leaking nipples b/l breast a few weeks ago noticed when she was taking a shower  Discharge is clear sometime whitish  Denies odor  Pt denies pinching or expressing nipples  Pt states b/l breast pain started the same time  Described pain as a sharp pain  Denies warmth, rash, or discoloration  Left breast she thinks she can feel a godfrey sized lump that does not move  Nothing felt on breast exam for right side  LMP 1/26/21 Pt noticed throughout the day nausea and has experienced some sweating only when nausea occurs  Denies fever or chills  Please review

## 2021-02-24 ENCOUNTER — TELEPHONE (OUTPATIENT)
Dept: OBGYN CLINIC | Facility: CLINIC | Age: 21
End: 2021-02-24

## 2021-02-24 NOTE — TELEPHONE ENCOUNTER
Pt states symptoms started about two days ago  Burning and itching as well as swelling  Denies any odor, discharge, lumps, sores, or pelvic pain/cramping  Pt took Monistat one last night  Advised pt to try applying coconut oil  Pt is going to give the office a call back if her symptoms worsen or do not improve to get an apt

## 2021-02-24 NOTE — TELEPHONE ENCOUNTER
Pt LM has been experiencing burning and itching feeling in her vagina  Urgent care will not treat her for yeast infection

## 2021-05-02 ENCOUNTER — OFFICE VISIT (OUTPATIENT)
Dept: URGENT CARE | Facility: CLINIC | Age: 21
End: 2021-05-02
Payer: COMMERCIAL

## 2021-05-02 VITALS
BODY MASS INDEX: 42.3 KG/M2 | WEIGHT: 285.6 LBS | RESPIRATION RATE: 16 BRPM | OXYGEN SATURATION: 97 % | HEART RATE: 99 BPM | DIASTOLIC BLOOD PRESSURE: 82 MMHG | HEIGHT: 69 IN | SYSTOLIC BLOOD PRESSURE: 118 MMHG | TEMPERATURE: 97.5 F

## 2021-05-02 DIAGNOSIS — J06.9 UPPER RESPIRATORY TRACT INFECTION, UNSPECIFIED TYPE: Primary | ICD-10-CM

## 2021-05-02 PROCEDURE — 99213 OFFICE O/P EST LOW 20 MIN: CPT | Performed by: PHYSICIAN ASSISTANT

## 2021-05-02 NOTE — PATIENT INSTRUCTIONS
Benign exam   She has no fever  She can use Mucinex D or Robitussin DM over-the-counter for cough and congestion  No exudates or signs of strep throat  She has no shortness of breath or change in taste or smell  If symptoms worsen follow-up here or with PCP  Continue to wear mask and keep hands clean

## 2021-05-02 NOTE — PROGRESS NOTES
[facility]        NAME: Domi Ferreira is a 23 y o  female  : 2000    MRN: [id number]  DATE: May 2, 2021  TIME: 1:25 PM    Assessment and Plan   Upper respiratory tract infection, unspecified type [J06 9]  1  Upper respiratory tract infection, unspecified type           Patient Instructions     Patient Instructions     Benign exam   She has no fever  She can use Mucinex D or Robitussin DM over-the-counter for cough and congestion  No exudates or signs of strep throat  She has no shortness of breath or change in taste or smell  If symptoms worsen follow-up here or with PCP  Continue to wear mask and keep hands clean  Follow up with PCP in 3-5 days  Proceed to  ER if symptoms worsen  Chief Complaint     Chief Complaint   Patient presents with    Sore Throat     Patient states of sore throat and productive cough since yesterday progressively getting worse  History of Present Illness         68-year-old female complains of 1 day of sore throat congestion and cough  No vomiting  No shortness of breath or chest pain  No meds over-the-counter for this  No fever  She has a little bit of runny nose  No sinus pain or pressure  No recent travel or sick contacts  No change in taste or smell  No GI symptoms  Review of Systems   Review of Systems   Constitutional: Negative for chills, fatigue and fever  HENT: Positive for sore throat  Negative for congestion, ear pain, postnasal drip, rhinorrhea, sinus pressure, sinus pain and trouble swallowing  Eyes: Negative for visual disturbance  Respiratory: Positive for cough  Negative for chest tightness and shortness of breath  Cardiovascular: Negative for chest pain and palpitations  Gastrointestinal: Negative for diarrhea, nausea and vomiting  Neurological: Negative for dizziness           Current Medications       Current Outpatient Medications:     albuterol (PROVENTIL HFA,VENTOLIN HFA) 90 mcg/act inhaler, 2 puff(s), Disp: , Rfl:     LATUDA 40 MG tablet, , Disp: , Rfl:     norgestrel-ethinyl estradiol (LO/OVRAL) 0 3 mg-30 mcg per tablet, Take 1 tablet by mouth daily, Disp: 28 tablet, Rfl: 2    venlafaxine 225 MG TB24, Take 225 mg by mouth daily, Disp: , Rfl:     citalopram (CeleXA) 40 mg tablet, Take 40 mg by mouth daily, Disp: , Rfl:     cloNIDine (CATAPRES) 0 1 mg tablet, , Disp: , Rfl:     Current Allergies     Allergies as of 05/02/2021 - Reviewed 05/02/2021   Allergen Reaction Noted    Zolpidem Hallucinations and Other (See Comments)             The following portions of the patient's history were reviewed and updated as appropriate: allergies, current medications, past family history, past medical history, past social history, past surgical history and problem list      Past Medical History:   Diagnosis Date    Depression     Psychiatric disorder     Schizophrenia ([address] )        History reviewed  No pertinent surgical history  Family History   Problem Relation Age of Onset    Leukemia Mother          Medications have been verified  Objective   /82   Pulse 99   Temp 97 5 °F (36 4 °C) (Temporal)   Resp 16   Ht 5' 9" (1 753 m)   Wt 130 kg (285 lb 9 6 oz)   LMP 04/15/2021   SpO2 97%   BMI 42 18 kg/m²        Physical Exam     Physical Exam  Constitutional:       General: She is not in acute distress  Appearance: She is well-developed  HENT:      Head: Normocephalic and atraumatic  Right Ear: Tympanic membrane, ear canal and external ear normal  No middle ear effusion  Tympanic membrane is not erythematous, retracted or bulging  Left Ear: Tympanic membrane, ear canal and external ear normal   No middle ear effusion  Tympanic membrane is not erythematous, retracted or bulging  Nose: Nose normal  No mucosal edema or rhinorrhea  Right Sinus: No maxillary sinus tenderness or frontal sinus tenderness        Left Sinus: No maxillary sinus tenderness or frontal sinus tenderness  Mouth/Throat:      Pharynx: No posterior oropharyngeal erythema  Eyes:      General:         Right eye: No discharge  Left eye: No discharge  Conjunctiva/sclera: Conjunctivae normal       Right eye: Right conjunctiva is not injected  No chemosis  Left eye: Left conjunctiva is not injected  No chemosis  Pupils: Pupils are equal, round, and reactive to light  Neck:      Musculoskeletal: Normal range of motion and neck supple  Cardiovascular:      Rate and Rhythm: Normal rate and regular rhythm  Heart sounds: Normal heart sounds  Pulmonary:      Effort: Pulmonary effort is normal  No respiratory distress  Breath sounds: Normal breath sounds  No wheezing or rales  Lymphadenopathy:      Cervical: No cervical adenopathy  Right cervical: No superficial cervical adenopathy  Left cervical: No superficial cervical adenopathy  Skin:     General: Skin is warm and dry  Findings: No rash  Neurological:      Mental Status: She is alert and oriented to person, place, and time  Cranial Nerves: No cranial nerve deficit

## 2021-06-01 ENCOUNTER — TELEPHONE (OUTPATIENT)
Dept: BEHAVIORAL/MENTAL HEALTH CLINIC | Age: 21
End: 2021-06-01

## 2021-06-01 NOTE — TELEPHONE ENCOUNTER
Call placed to the client who failed to show up for a scheduled appointment  Message left inviting her to call back to reschedule if services were still needed

## 2021-06-30 ENCOUNTER — OFFICE VISIT (OUTPATIENT)
Dept: OBGYN CLINIC | Facility: CLINIC | Age: 21
End: 2021-06-30
Payer: COMMERCIAL

## 2021-06-30 VITALS
HEIGHT: 69 IN | DIASTOLIC BLOOD PRESSURE: 92 MMHG | BODY MASS INDEX: 41.18 KG/M2 | WEIGHT: 278 LBS | SYSTOLIC BLOOD PRESSURE: 140 MMHG

## 2021-06-30 DIAGNOSIS — T74.21XA SEXUAL ASSAULT OF ADULT, INITIAL ENCOUNTER: Primary | ICD-10-CM

## 2021-06-30 DIAGNOSIS — Z11.3 SCREENING EXAMINATION FOR VENEREAL DISEASE: ICD-10-CM

## 2021-06-30 DIAGNOSIS — Z32.01 POSITIVE PREGNANCY TEST: ICD-10-CM

## 2021-06-30 PROCEDURE — 87591 N.GONORRHOEAE DNA AMP PROB: CPT | Performed by: PHYSICIAN ASSISTANT

## 2021-06-30 PROCEDURE — 99214 OFFICE O/P EST MOD 30 MIN: CPT | Performed by: PHYSICIAN ASSISTANT

## 2021-06-30 PROCEDURE — 87661 TRICHOMONAS VAGINALIS AMPLIF: CPT | Performed by: PHYSICIAN ASSISTANT

## 2021-06-30 PROCEDURE — 87491 CHLMYD TRACH DNA AMP PROBE: CPT | Performed by: PHYSICIAN ASSISTANT

## 2021-06-30 RX ORDER — OMEPRAZOLE 20 MG/1
CAPSULE, DELAYED RELEASE ORAL
COMMUNITY
End: 2021-09-21

## 2021-06-30 RX ORDER — DIPHENHYDRAMINE HCL 25 MG
25 CAPSULE ORAL DAILY PRN
COMMUNITY
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
## 2021-07-02 LAB
C TRACH DNA SPEC QL NAA+PROBE: NEGATIVE
N GONORRHOEA DNA SPEC QL NAA+PROBE: NEGATIVE
T VAGINALIS RRNA SPEC QL NAA+PROBE: NEGATIVE

## 2021-07-05 PROBLEM — T74.21XA SEXUAL ASSAULT OF ADULT: Status: ACTIVE | Noted: 2021-07-05
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
## 2021-07-23 DIAGNOSIS — N94.6 DYSMENORRHEA: ICD-10-CM

## 2021-07-23 DIAGNOSIS — N92.6 IRREGULAR MENSES: ICD-10-CM

## 2021-07-28 ENCOUNTER — TELEPHONE (OUTPATIENT)
Dept: PSYCHIATRY | Facility: CLINIC | Age: 21
End: 2021-07-28

## 2021-09-02 ENCOUNTER — OFFICE VISIT (OUTPATIENT)
Dept: PSYCHIATRY | Facility: CLINIC | Age: 21
End: 2021-09-02
Payer: COMMERCIAL

## 2021-09-02 DIAGNOSIS — F31.32 BIPOLAR 1 DISORDER, DEPRESSED, MODERATE (HCC): Primary | ICD-10-CM

## 2021-09-02 DIAGNOSIS — F41.1 GENERALIZED ANXIETY DISORDER WITH PANIC ATTACKS: ICD-10-CM

## 2021-09-02 DIAGNOSIS — F41.0 GENERALIZED ANXIETY DISORDER WITH PANIC ATTACKS: ICD-10-CM

## 2021-09-02 PROCEDURE — 90792 PSYCH DIAG EVAL W/MED SRVCS: CPT

## 2021-09-02 RX ORDER — LURASIDONE HYDROCHLORIDE 40 MG/1
60 TABLET, FILM COATED ORAL
Qty: 45 TABLET | Refills: 0 | Status: SHIPPED | OUTPATIENT
Start: 2021-09-02 | End: 2021-09-30

## 2021-09-02 RX ORDER — BUSPIRONE HYDROCHLORIDE 5 MG/1
5 TABLET ORAL 2 TIMES DAILY
Qty: 60 TABLET | Refills: 1 | Status: SHIPPED | OUTPATIENT
Start: 2021-09-02 | End: 2021-11-29 | Stop reason: SDUPTHER

## 2021-09-02 RX ORDER — LURASIDONE HYDROCHLORIDE 40 MG/1
60 TABLET, FILM COATED ORAL
Qty: 45 TABLET | Refills: 0 | Status: SHIPPED | OUTPATIENT
Start: 2021-09-02 | End: 2021-09-02

## 2021-09-02 RX ORDER — VENLAFAXINE HYDROCHLORIDE 150 MG/1
150 TABLET, EXTENDED RELEASE ORAL DAILY
Qty: 30 TABLET | Refills: 0 | Status: SHIPPED | OUTPATIENT
Start: 2021-09-02 | End: 2021-09-30 | Stop reason: SDUPTHER

## 2021-09-02 NOTE — BH TREATMENT PLAN
TREATMENT PLAN (Medication Management Only)        [facility] [place]    Name and Date of Birth:  Mukesh Martinez 23 y o  2000  Date of Treatment Plan: September 2, 2021  Diagnosis/Diagnoses:    1  Bipolar 1 disorder, depressed, moderate ([address] )    2  Generalized anxiety disorder with panic attacks      Strengths/Personal Resources for Self-Care: supportive family, taking medications as prescribed, ability to understand psychiatric illness  Area/Areas of need (in own words): anxiety symptoms, depressive symptoms, mood instability  1  Long Term Goal: alleviate depression  Target Date:6 months - 3/2/2022  Person/Persons responsible for completion of goal: Laurence  2  Short Term Objective (s) - How will we reach this goal?:   A  Provider new recommended medication/dosage changes and/or continue medication(s):  Increase Latuda to 60mg, add Buspar 5mg BID, continue Effexor 150mg once daily  B  Attend medication management appointments regularly  C  Eat a healthy diet   Target Date:6 months - 3/2/2022  Person/Persons Responsible for Completion of Goal: Laurence  And [employer] CRNP  Progress Towards Goals: starting treatment  Treatment Modality: medication management every 4 weeks  Review due 180 days from date of this plan: 6 months - 3/2/2022  Expected length of service: ongoing treatment  My Physician/PA/NP and I have developed this plan together and I agree to work on the goals and objectives  I understand the treatment goals that were developed for my treatment
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
## 2021-09-18 ENCOUNTER — OFFICE VISIT (OUTPATIENT)
Dept: URGENT CARE | Facility: CLINIC | Age: 21
End: 2021-09-18
Payer: COMMERCIAL

## 2021-09-18 VITALS
DIASTOLIC BLOOD PRESSURE: 84 MMHG | HEART RATE: 102 BPM | RESPIRATION RATE: 16 BRPM | OXYGEN SATURATION: 98 % | SYSTOLIC BLOOD PRESSURE: 120 MMHG | TEMPERATURE: 97.2 F

## 2021-09-18 DIAGNOSIS — R35.0 URINARY FREQUENCY: Primary | ICD-10-CM

## 2021-09-18 LAB
SL AMB  POCT GLUCOSE, UA: NORMAL
SL AMB LEUKOCYTE ESTERASE,UA: NORMAL
SL AMB POCT BILIRUBIN,UA: NORMAL
SL AMB POCT BLOOD,UA: NORMAL
SL AMB POCT CLARITY,UA: CLEAR
SL AMB POCT COLOR,UA: YELLOW
SL AMB POCT KETONES,UA: NORMAL
SL AMB POCT NITRITE,UA: NORMAL
SL AMB POCT PH,UA: 5
SL AMB POCT SPECIFIC GRAVITY,UA: 1.03
SL AMB POCT URINE HCG: NORMAL
SL AMB POCT URINE PROTEIN: NORMAL
SL AMB POCT UROBILINOGEN: 0.2

## 2021-09-18 PROCEDURE — 81025 URINE PREGNANCY TEST: CPT | Performed by: EMERGENCY MEDICINE

## 2021-09-18 PROCEDURE — 81002 URINALYSIS NONAUTO W/O SCOPE: CPT | Performed by: EMERGENCY MEDICINE

## 2021-09-18 PROCEDURE — 87086 URINE CULTURE/COLONY COUNT: CPT | Performed by: EMERGENCY MEDICINE

## 2021-09-18 PROCEDURE — 99213 OFFICE O/P EST LOW 20 MIN: CPT | Performed by: EMERGENCY MEDICINE

## 2021-09-18 RX ORDER — CEPHALEXIN 500 MG/1
500 CAPSULE ORAL 4 TIMES DAILY
Qty: 28 CAPSULE | Refills: 0 | Status: SHIPPED | OUTPATIENT
Start: 2021-09-18 | End: 2021-09-25

## 2021-09-18 NOTE — PATIENT INSTRUCTIONS
1   F/u with your Gynecologist ASAP  2   2 Tylenol extra strength every 4 hours for pain  3  If symptoms get worse, proceed to the ER  4  Take Keflex x 1 week      Urinary Urgency and Frequency   AMBULATORY CARE:   Urinary urgency and frequency  is a condition that increases how strongly or how often you need to urinate  The condition may also be called urgency-frequency syndrome  Urinary urgency means you feel such a strong need to urinate that you have trouble waiting  You may also feel discomfort in your bladder  Urinary frequency means you need to urinate many times during the day  This may also be called increased daytime frequency  You may be woken from sleep by the need to urinate  Urgency and frequency often happen together, but you may only have one  Contact your healthcare provider if:   · Your urine is pink, or you notice blood in your urine  · You have pain with urination  · You continue to have symptoms even after you take your medicine  · You have new or worsening symptoms  · You have questions or concerns about your condition or care  Treatment  will depend on the type and cause of your urination problems  You may need any of the following:  · Medicines  may be given to relax your bladder and decrease urination  You may also need antibiotics if your symptoms are caused by a bacterial infection  · Sacral nerve stimulation  sends electrical signals to your sacral nerve through a small device implanted under your skin  Your sacral nerve controls your bladder, sphincter, and pelvic floor muscles  · Botox injections  into your bladder may help relax your bladder muscle to decrease urgency and frequency  · Surgery  may be done if all other treatments cannot help you control your bladder  Manage urinary urgency and frequency:   · Keep a record of your urination patterns for a few days    Write down the number of times you urinate over 24 hours, the amount, and if you have urine leakage  Record how strong the urge to urinate was each time  Your healthcare provider may also want you to record the type and amount of liquids you drink  · Train your bladder  Go to the bathroom at set times, such as every 2 hours, even if you do not feel the urge to go  You can also try to hold your urine when you feel the urge to go  For example, hold your urine for 5 minutes when you feel the urge to go  As that becomes easier, hold your urine for 10 minutes  Work up to every 3 or 4 hours to help control your bladder  · Limit liquids as directed  Limit liquids to decrease the amount you urinate  Ask how much liquid to drink each day and which liquids are best for you  You may need to avoid drinking liquids several hours before you go to sleep  Your healthcare provider may also recommend that you limit caffeine and alcohol  · Do Kegel exercises often  Kegel exercises help strengthen your pelvic muscles and improve bladder control  These muscles help you stop urinating  Squeeze these muscles tightly for 5 seconds like you are trying to stop the flow of urine  Then relax for 5 seconds  Gradually work up to squeezing for 10 seconds  Do 3 sets of 15 repetitions a day, or as directed  · Exercise regularly and maintain a healthy weight  Ask your healthcare provider how much you should weigh and about the best exercise plan for you  Extra weight puts pressure on your bladder and may make your symptoms worse  Ask your provider to help you create a safe weight loss plan if you are overweight  Follow up with your healthcare provider as directed: Your healthcare provider may refer you to a specialist to find the cause of your symptoms  Write down your questions so you remember to ask them during your visits  © Copyright [employer] 2021 Information is for End User's use only and may not be sold, redistributed or otherwise used for commercial purposes   All illustrations and images included in [facility] are the copyrighted property of [employer]  or [address] [place], [place] [id number]   The above information is an  only  It is not intended as medical advice for individual conditions or treatments  Talk to your doctor, nurse or pharmacist before following any medical regimen to see if it is safe and effective for you

## 2021-09-18 NOTE — PROGRESS NOTES
[facility]        NAME: Desire Shelton is a 23 y o  female  : 2000    MRN: [id number]  DATE: 2021  TIME: 5:52 PM    Assessment and Plan   Urinary frequency [R35 0]  1  Urinary frequency  POCT urine dip    POCT urine HCG    Urine culture    cephalexin (KEFLEX) 500 mg capsule         Patient Instructions     Patient Instructions     1  F/u with your Gynecologist ASAP  2   2 Tylenol extra strength every 4 hours for pain  3  If symptoms get worse, proceed to the ER  4  Take Keflex x 1 week      Urinary Urgency and Frequency   AMBULATORY CARE:   Urinary urgency and frequency  is a condition that increases how strongly or how often you need to urinate  The condition may also be called urgency-frequency syndrome  Urinary urgency means you feel such a strong need to urinate that you have trouble waiting  You may also feel discomfort in your bladder  Urinary frequency means you need to urinate many times during the day  This may also be called increased daytime frequency  You may be woken from sleep by the need to urinate  Urgency and frequency often happen together, but you may only have one  Contact your healthcare provider if:   · Your urine is pink, or you notice blood in your urine  · You have pain with urination  · You continue to have symptoms even after you take your medicine  · You have new or worsening symptoms  · You have questions or concerns about your condition or care  Treatment  will depend on the type and cause of your urination problems  You may need any of the following:  · Medicines  may be given to relax your bladder and decrease urination  You may also need antibiotics if your symptoms are caused by a bacterial infection  · Sacral nerve stimulation  sends electrical signals to your sacral nerve through a small device implanted under your skin  Your sacral nerve controls your bladder, sphincter, and pelvic floor muscles       · Botox injections  into your bladder may help relax your bladder muscle to decrease urgency and frequency  · Surgery  may be done if all other treatments cannot help you control your bladder  Manage urinary urgency and frequency:   · Keep a record of your urination patterns for a few days  Write down the number of times you urinate over 24 hours, the amount, and if you have urine leakage  Record how strong the urge to urinate was each time  Your healthcare provider may also want you to record the type and amount of liquids you drink  · Train your bladder  Go to the bathroom at set times, such as every 2 hours, even if you do not feel the urge to go  You can also try to hold your urine when you feel the urge to go  For example, hold your urine for 5 minutes when you feel the urge to go  As that becomes easier, hold your urine for 10 minutes  Work up to every 3 or 4 hours to help control your bladder  · Limit liquids as directed  Limit liquids to decrease the amount you urinate  Ask how much liquid to drink each day and which liquids are best for you  You may need to avoid drinking liquids several hours before you go to sleep  Your healthcare provider may also recommend that you limit caffeine and alcohol  · Do Kegel exercises often  Kegel exercises help strengthen your pelvic muscles and improve bladder control  These muscles help you stop urinating  Squeeze these muscles tightly for 5 seconds like you are trying to stop the flow of urine  Then relax for 5 seconds  Gradually work up to squeezing for 10 seconds  Do 3 sets of 15 repetitions a day, or as directed  · Exercise regularly and maintain a healthy weight  Ask your healthcare provider how much you should weigh and about the best exercise plan for you  Extra weight puts pressure on your bladder and may make your symptoms worse  Ask your provider to help you create a safe weight loss plan if you are overweight      Follow up with your healthcare provider as directed: Your healthcare provider may refer you to a specialist to find the cause of your symptoms  Write down your questions so you remember to ask them during your visits  ©  [employer]  Information is for End User's use only and may not be sold, redistributed or otherwise used for commercial purposes  All illustrations and images included in CareNotes® are the copyrighted property of A D A M , Inc  or Danae Mahoney   The above information is an  only  It is not intended as medical advice for individual conditions or treatments  Talk to your doctor, nurse or pharmacist before following any medical regimen to see if it is safe and effective for you  Follow up with PCP in 3-5 days  Proceed to  ER if symptoms worsen  Chief Complaint     Chief Complaint   Patient presents with    Possible UTI     Pt c/o back pain x 2 days, urinary frequency and spotting x 4 days         History of Present Illness       28-year-old white female with a chief complaint of urinary frequency, pressure and back pain  Patient states the symptoms started 2 days ago  She denies any fever but does admit to chills  Patient is also complaining of spotting over the past 4 days  Patient states she had an  about a month ago  Review of Systems   Review of Systems   Constitutional: Positive for chills  Negative for fever  HENT: Negative for congestion and rhinorrhea  Eyes: Negative for discharge and visual disturbance  Respiratory: Negative for shortness of breath and wheezing  Cardiovascular: Negative for chest pain and palpitations  Gastrointestinal: Negative for abdominal pain and vomiting  Endocrine: Negative for polydipsia and polyuria  Genitourinary: Positive for dysuria and frequency  Negative for hematuria  Musculoskeletal: Positive for back pain  Negative for arthralgias, gait problem and neck stiffness  Skin: Negative for rash and wound  Neurological: Negative for dizziness and headaches  Psychiatric/Behavioral: Negative for confusion and suicidal ideas  Current Medications       Current Outpatient Medications:     albuterol (PROVENTIL HFA,VENTOLIN HFA) 90 mcg/act inhaler, 2 puff(s), Disp: , Rfl:     busPIRone (BUSPAR) 5 mg tablet, Take 1 tablet (5 mg total) by mouth 2 (two) times a day, Disp: 60 tablet, Rfl: 1    diphenhydrAMINE (BENADRYL) 25 mg capsule, Take 25 mg by mouth daily as needed, Disp: , Rfl:     Latuda 40 MG tablet, Take 1 5 tablets (60 mg total) by mouth daily with dinner, Disp: 45 tablet, Rfl: 0    norgestrel-ethinyl estradiol (LO/OVRAL) 0 3 mg-30 mcg per tablet, Take 1 tablet by mouth daily, Disp: 28 tablet, Rfl: 1    venlafaxine 150 MG TB24, Take 1 tablet (150 mg total) by mouth daily, Disp: 30 tablet, Rfl: 0    cephalexin (KEFLEX) 500 mg capsule, Take 1 capsule (500 mg total) by mouth 4 (four) times a day for 7 days, Disp: 28 capsule, Rfl: 0    omeprazole (PriLOSEC) 20 mg delayed release capsule, as directed (Patient not taking: Reported on 9/18/2021), Disp: , Rfl:     Current Allergies     Allergies as of 09/18/2021 - Reviewed 09/18/2021   Allergen Reaction Noted    Zolpidem Hallucinations and Other (See Comments)             The following portions of the patient's history were reviewed and updated as appropriate: allergies, current medications, past family history, past medical history, past social history, past surgical history and problem list      Past Medical History:   Diagnosis Date    Depression     Psychiatric disorder     [facility]        History reviewed  No pertinent surgical history  Family History   Problem Relation Age of Onset    Leukemia Mother          Medications have been verified  Objective   /84   Pulse 102   Temp (!) 97 2 °F (36 2 °C) (Temporal)   Resp 16   SpO2 98%        Physical Exam     Physical Exam  Vitals reviewed     Constitutional:       General: She is not in acute distress  Appearance: She is well-developed  She is not ill-appearing, toxic-appearing or diaphoretic  Comments: 18-year-old white female sitting on the stretcher in no acute distress  HENT:      Head: Normocephalic and atraumatic  Mouth/Throat:      Pharynx: Oropharynx is clear  Eyes:      General: No scleral icterus  Extraocular Movements: Extraocular movements intact  Pupils: Pupils are equal, round, and reactive to light  Cardiovascular:      Rate and Rhythm: Normal rate  Heart sounds: Normal heart sounds  Pulmonary:      Effort: Pulmonary effort is normal  No respiratory distress  Breath sounds: Normal breath sounds  No stridor  No wheezing, rhonchi or rales  Chest:      Chest wall: No tenderness  Abdominal:      General: Abdomen is flat  Bowel sounds are normal  There is no distension  Palpations: Abdomen is soft  There is no shifting dullness, hepatomegaly, splenomegaly or mass  Tenderness: There is abdominal tenderness  There is right CVA tenderness and left CVA tenderness  There is no guarding  Negative signs include Del Real's sign and McBurney's sign  Hernia: No hernia is present  Comments: Patient has more tenderness on the right flank region than the left,  however she is tender bilaterally  ABD: + tenderness RLQ and suprapubic region   Musculoskeletal:         General: Normal range of motion  Skin:     General: Skin is warm and dry  Coloration: Skin is not cyanotic, jaundiced, mottled or pale  Findings: No erythema  Neurological:      General: No focal deficit present  Mental Status: She is alert and oriented to person, place, and time  Psychiatric:         Mood and Affect: Mood normal        Patient's urine dip was negative however due to her symptoms I will treat empirically for UTI  Patient's beta hCG was also negative

## 2021-09-20 LAB — BACTERIA UR CULT: NORMAL

## 2021-09-21 ENCOUNTER — ANNUAL EXAM (OUTPATIENT)
Dept: OBGYN CLINIC | Facility: CLINIC | Age: 21
End: 2021-09-21
Payer: COMMERCIAL

## 2021-09-21 VITALS
BODY MASS INDEX: 39.99 KG/M2 | SYSTOLIC BLOOD PRESSURE: 122 MMHG | HEIGHT: 69 IN | WEIGHT: 270 LBS | DIASTOLIC BLOOD PRESSURE: 74 MMHG

## 2021-09-21 DIAGNOSIS — N94.6 DYSMENORRHEA: ICD-10-CM

## 2021-09-21 DIAGNOSIS — N92.6 IRREGULAR MENSES: ICD-10-CM

## 2021-09-21 DIAGNOSIS — N63.20 LEFT BREAST MASS: ICD-10-CM

## 2021-09-21 DIAGNOSIS — Z01.419 ROUTINE GYNECOLOGICAL EXAMINATION: Primary | ICD-10-CM

## 2021-09-21 PROCEDURE — G0145 SCR C/V CYTO,THINLAYER,RESCR: HCPCS | Performed by: PHYSICIAN ASSISTANT

## 2021-09-21 PROCEDURE — 99395 PREV VISIT EST AGE 18-39: CPT | Performed by: PHYSICIAN ASSISTANT

## 2021-09-28 LAB
LAB AP GYN PRIMARY INTERPRETATION: NORMAL
LAB AP LMP: NORMAL
Lab: NORMAL
PATH INTERP SPEC-IMP: NORMAL

## 2021-09-29 DIAGNOSIS — B96.89 BV (BACTERIAL VAGINOSIS): Primary | ICD-10-CM

## 2021-09-29 DIAGNOSIS — N76.0 BV (BACTERIAL VAGINOSIS): Primary | ICD-10-CM

## 2021-09-29 RX ORDER — METRONIDAZOLE 500 MG/1
500 TABLET ORAL 2 TIMES DAILY
Qty: 14 TABLET | Refills: 0 | Status: SHIPPED | OUTPATIENT
Start: 2021-09-29 | End: 2021-10-06

## 2021-09-30 ENCOUNTER — OFFICE VISIT (OUTPATIENT)
Dept: PSYCHIATRY | Facility: CLINIC | Age: 21
End: 2021-09-30
Payer: COMMERCIAL

## 2021-09-30 DIAGNOSIS — F31.32 BIPOLAR 1 DISORDER, DEPRESSED, MODERATE (HCC): Primary | ICD-10-CM

## 2021-09-30 DIAGNOSIS — F41.0 GENERALIZED ANXIETY DISORDER WITH PANIC ATTACKS: ICD-10-CM

## 2021-09-30 DIAGNOSIS — F41.1 GENERALIZED ANXIETY DISORDER WITH PANIC ATTACKS: ICD-10-CM

## 2021-09-30 PROCEDURE — 99213 OFFICE O/P EST LOW 20 MIN: CPT

## 2021-09-30 RX ORDER — ESCITALOPRAM OXALATE 5 MG/1
5 TABLET ORAL DAILY
Qty: 30 TABLET | Refills: 0 | Status: SHIPPED | OUTPATIENT
Start: 2021-09-30 | End: 2021-10-25

## 2021-09-30 RX ORDER — VENLAFAXINE HYDROCHLORIDE 150 MG/1
150 TABLET, EXTENDED RELEASE ORAL DAILY
Qty: 30 TABLET | Refills: 0 | Status: SHIPPED | OUTPATIENT
Start: 2021-09-30 | End: 2021-10-04 | Stop reason: RX

## 2021-09-30 RX ORDER — LURASIDONE HYDROCHLORIDE 40 MG/1
40 TABLET, FILM COATED ORAL
Qty: 30 TABLET | Refills: 0 | Status: SHIPPED | OUTPATIENT
Start: 2021-09-30 | End: 2021-11-08 | Stop reason: SDUPTHER

## 2021-09-30 NOTE — PSYCH
Regular Visit    Problem List Items Addressed This Visit     None      Visit Diagnoses     Bipolar 1 disorder, depressed, moderate ([facility] )    -  Primary    Relevant Medications    escitalopram (LEXAPRO) 5 mg tablet    Generalized anxiety disorder with panic attacks        Relevant Medications    escitalopram (LEXAPRO) 5 mg tablet             Encounter provider SERVANDO Iqbal    Provider located at   [address] [address] [phone]    Recent Visits  No visits were found meeting these conditions  Showing recent visits within past 7 days and meeting all other requirements  Today's Visits  Date Type Provider Dept   09/30/21 Office Visit SERVANDO Iqbal [facility]   Showing today's visits and meeting all other requirements  Future Appointments  No visits were found meeting these conditions  Showing future appointments within next 150 days and meeting all other requirements       HPI     Current Outpatient Medications   Medication Sig Dispense Refill    albuterol (PROVENTIL HFA,VENTOLIN HFA) 90 mcg/act inhaler 2 puff(s)      busPIRone (BUSPAR) 5 mg tablet Take 1 tablet (5 mg total) by mouth 2 (two) times a day 60 tablet 1    diphenhydrAMINE (BENADRYL) 25 mg capsule Take 25 mg by mouth daily as needed      escitalopram (LEXAPRO) 5 mg tablet Take 1 tablet (5 mg total) by mouth daily 30 tablet 0    Latuda 40 MG tablet Take 1 5 tablets (60 mg total) by mouth daily with dinner 45 tablet 0    metroNIDAZOLE (FLAGYL) 500 mg tablet Take 1 tablet (500 mg total) by mouth 2 (two) times a day for 7 days 14 tablet 0    norgestrel-ethinyl estradiol (LO/OVRAL) 0 3 mg-30 mcg per tablet Take 1 tablet by mouth daily 84 tablet 3    venlafaxine 150 MG TB24 Take 1 tablet (150 mg total) by mouth daily 30 tablet 0     No current facility-administered medications for this visit         Review of Systems    I spent 20 minutes directly with the patient during this visit      [facility]    Name and Date of Birth:  Wilkie Gosselin 23 y o  2000 MRN: [id number]    Date of Visit: September 30, 2021    Allergies   Allergen Reactions    Zolpidem Hallucinations and Other (See Comments)     Other reaction(s): Hallucinations     SUBJECTIVE:    Norris Obregon is seen today for a follow up for depression and Bipolar Disorder  She reports that she continues to improve slowly since the last visit  She reports continued depression with minimal improvement, pt is on 150mg Effexor and does not want it increased due to giving her increased anxiety in the past when it was increased  She instead was willing to add 5mg Lexapro to help relieve depression symptoms including poor motivation, dysphoria, decreased energy and fatigue  She reports decreased anxiety since starting Buspar 5mg BID, denies current panic attacks  Pt denies any manic symptoms since last visit  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 and she is trying to get outside to hike which she enjoys  Denies SI/HI  She reports sedation from increased dose of latuda 60mg  She would like to go back down to 40mg daily at   PLAN:  -Add lexapro 5mg with goal of possibly decreasing Effexor if Lexapro is effective for depression symptoms  -Decrease Latuda to 40mg, reports too much sedation as side effect  - Continue Buspar 5mg BID        Aware of 24 hour and weekend coverage for urgent situations accessed by calling [facility] main practice number  Continue therapy with private therapist    Diagnoses and all orders for this visit:    Bipolar 1 disorder, depressed, moderate ([facility] )  -     escitalopram (LEXAPRO) 5 mg tablet; Take 1 tablet (5 mg total) by mouth daily    Generalized anxiety disorder with panic attacks  -     escitalopram (LEXAPRO) 5 mg tablet;  Take 1 tablet (5 mg total) by mouth daily        Current Outpatient Medications on File Prior to Visit   Medication Sig Dispense Refill    albuterol (PROVENTIL HFA,VENTOLIN HFA) 90 mcg/act inhaler 2 puff(s)      busPIRone (BUSPAR) 5 mg tablet Take 1 tablet (5 mg total) by mouth 2 (two) times a day 60 tablet 1    diphenhydrAMINE (BENADRYL) 25 mg capsule Take 25 mg by mouth daily as needed      Latuda 40 MG tablet Take 1 5 tablets (60 mg total) by mouth daily with dinner 45 tablet 0    metroNIDAZOLE (FLAGYL) 500 mg tablet Take 1 tablet (500 mg total) by mouth 2 (two) times a day for 7 days 14 tablet 0    norgestrel-ethinyl estradiol (LO/OVRAL) 0 3 mg-30 mcg per tablet Take 1 tablet by mouth daily 84 tablet 3    venlafaxine 150 MG TB24 Take 1 tablet (150 mg total) by mouth daily 30 tablet 0     No current facility-administered medications on file prior to visit  Psychotherapy Provided:     Individual psychotherapy provided: Supportive counseling provided  HPI ROS Appetite Changes and Sleep:     She reports hypersomnia, normal appetite, low energy  Denies homicidal ideation, Patient denies suicidal    Review Of Systems:      HPI ROS:               Medication Side Effects:  sedation     Depression (10 worst): 5/10 (Was 5/10)   Anxiety (10 worst): 4/10 (Was 5/10)   Safety concerns (SI, HI, etc): denies (Was denies)   Sleep:  Increased sleep 9-10  hrs (Was over sleeps)   Energy: low (Was low)   Appetite: good (Was good)   Weight Change: denies                                                            Mental Status Evaluation:    Appearance Adequate hygiene and grooming   Behavior cooperative   Mood depressed  Depression Scale - 5 of 10 (0 = No depression)  Anxiety Scale - 4 of 10 (0 = No anxiety)   Speech Normal rate and volume   Affect appropriate   Thought Processes Goal directed and coherent   Thought Content Does not verbalize delusional material   Associations Tightly connected   Perceptual Disturbances Denies hallucinations and does not appear to be responding to internal stimuli   Risk Potential Suicidal/Homicidal Ideation - No evidence of suicidal or homicidal ideation and patient does not verbalize suicidal or homicidal ideation  Risk of Violence - No evidence of risk for violence found on assessment  Risk of Self Mutilation - No evidence of risk for self mutilation found on assessment   Orientation oriented to person, place, time/date and situation   Memory recent and remote memory grossly intact   Consciousness alert and awake   Attention/Concentration attention span and concentration are age appropriate   Insight improving   Judgement improving   Muscle Strength and Gait normal muscle strength and normal muscle tone, normal gait/station and normal balance   Motor Activity no abnormal movements   Language no difficulty naming common objects, no difficulty repeating a phrase, no difficulty writing a sentence   Fund of Knowledge adequate knowledge of current events  adequate fund of knowledge regarding past history  adequate fund of knowledge regarding vocabulary      Past Psychiatric History Update:     Inpatient Psychiatric Admission Since Last Encounter:   no  Changes to Outpatient Psychiatric Treatment Team:    no  Suicide Attempt Or Self Mutilation Since Last Encounter:   no  Incidence of Violent Behavior Since Last Encounter:   no    Traumatic History Update:     New Onset of Abuse Since Last Encounter:   no  Traumatic Events Since Last Encounter:   no    Past Medical History:    Past Medical History:   Diagnosis Date    Depression     Psychiatric disorder     Schizophrenia ([facility] )      No past medical history pertinent negatives  History reviewed  No pertinent surgical history    Allergies   Allergen Reactions    Zolpidem Hallucinations and Other (See Comments)     Other reaction(s): Hallucinations     Substance Abuse History:    Social History     Substance and Sexual Activity   Alcohol Use No Social History     Substance and Sexual Activity   Drug Use No     Social History:    Social History     Socioeconomic History    Marital status: Significant Other     Spouse name: Not on file    Number of children: Not on file    Years of education: Not on file    Highest education level: Not on file   Occupational History    Not on file   Tobacco Use    Smoking status: Never Smoker    Smokeless tobacco: Never Used   Vaping Use    Vaping Use: Never used   Substance and Sexual Activity    Alcohol use: No    Drug use: No    Sexual activity: Yes     Partners: Male     Birth control/protection: OCP   Other Topics Concern    Not on file   Social History Narrative    Not on file     Social Determinants of Health     Financial Resource Strain:     Difficulty of Paying Living Expenses:    Food Insecurity:     Worried About Running Out of Food in the Last Year:     [address] in the Last Year:    Transportation Needs:     Lack of Transportation (Medical):  Lack of Transportation (Non-Medical):    Physical Activity:     Days of Exercise per Week:     Minutes of Exercise per Session:    Stress:     Feeling of Stress :    Social Connections:     Frequency of Communication with Friends and Family:     Frequency of Social Gatherings with Friends and Family:     Attends Worship Services:     Active Member of Clubs or Organizations:     Attends Club or Organization Meetings:     Marital Status:    Intimate Partner Violence:     Fear of Current or Ex-Partner:     Emotionally Abused:     Physically Abused:     Sexually Abused:      Family Psychiatric History:     Family History   Problem Relation Age of Onset    Leukemia Mother      History Review: The following portions of the patient's history were reviewed and updated as appropriate: allergies, current medications, past family history, past medical history, past social history, past surgical history and problem list     OBJECTIVE: Vital signs in last 24 hours: There were no vitals filed for this visit  Laboratory Results: I have personally reviewed all pertinent laboratory/tests results  Suicide/Homicide Risk Assessment:    Risk of Harm to Self:  Based on today's assessment, Lesa Blair presents the following risk of harm to self: low    Risk of Harm to Others:  Based on today's assessment, Lesa Blair presents the following risk of harm to others: low    The following interventions are recommended: no intervention changes needed    Medications Risks/Benefits:      Risks, Benefits And Possible Side Effects Of Medications:    Discussed risks and benefits of treatment with patient including risk of suicidality, serotonin syndrome, increased QTc interval and SIADH related to treatment with antidepressants; Risk of induction of manic symptoms in certain patient populations     Controlled Medication Discussion:     Not applicable    Treatment Plan:    Due for update/Updated:   no  Last treatment plan done 9/2 by Jj Phelps  Treatment Plan due on 3/2/22  SERVANDO Simpson 09/30/21    This note was shared with patient

## 2021-10-04 ENCOUNTER — TELEPHONE (OUTPATIENT)
Dept: PSYCHIATRY | Facility: CLINIC | Age: 21
End: 2021-10-04

## 2021-10-04 DIAGNOSIS — F31.32 BIPOLAR 1 DISORDER, DEPRESSED, MODERATE (HCC): Primary | ICD-10-CM

## 2021-10-04 RX ORDER — VENLAFAXINE HYDROCHLORIDE 150 MG/1
150 CAPSULE, EXTENDED RELEASE ORAL DAILY
Qty: 30 CAPSULE | Refills: 0 | Status: SHIPPED | OUTPATIENT
Start: 2021-10-04 | End: 2021-10-28

## 2021-10-23 DIAGNOSIS — F41.0 GENERALIZED ANXIETY DISORDER WITH PANIC ATTACKS: ICD-10-CM

## 2021-10-23 DIAGNOSIS — F31.32 BIPOLAR 1 DISORDER, DEPRESSED, MODERATE (HCC): ICD-10-CM

## 2021-10-23 DIAGNOSIS — F41.1 GENERALIZED ANXIETY DISORDER WITH PANIC ATTACKS: ICD-10-CM

## 2021-10-25 RX ORDER — ESCITALOPRAM OXALATE 5 MG/1
TABLET ORAL
Qty: 30 TABLET | Refills: 0 | Status: SHIPPED | OUTPATIENT
Start: 2021-10-25 | End: 2021-11-08

## 2021-10-28 DIAGNOSIS — F31.32 BIPOLAR 1 DISORDER, DEPRESSED, MODERATE (HCC): ICD-10-CM

## 2021-10-28 RX ORDER — VENLAFAXINE HYDROCHLORIDE 150 MG/1
CAPSULE, EXTENDED RELEASE ORAL
Qty: 30 CAPSULE | Refills: 0 | Status: SHIPPED | OUTPATIENT
Start: 2021-10-28 | End: 2021-11-29 | Stop reason: SDUPTHER

## 2021-11-08 ENCOUNTER — TELEPHONE (OUTPATIENT)
Dept: PSYCHIATRY | Facility: CLINIC | Age: 21
End: 2021-11-08

## 2021-11-08 ENCOUNTER — TELEPHONE (OUTPATIENT)
Dept: OBGYN CLINIC | Facility: CLINIC | Age: 21
End: 2021-11-08

## 2021-11-08 DIAGNOSIS — F31.32 BIPOLAR 1 DISORDER, DEPRESSED, MODERATE (HCC): ICD-10-CM

## 2021-11-08 RX ORDER — LURASIDONE HYDROCHLORIDE 40 MG/1
40 TABLET, FILM COATED ORAL
Qty: 30 TABLET | Refills: 0 | Status: SHIPPED | OUTPATIENT
Start: 2021-11-08 | End: 2021-11-29 | Stop reason: SDUPTHER

## 2021-11-29 ENCOUNTER — OFFICE VISIT (OUTPATIENT)
Dept: PSYCHIATRY | Facility: CLINIC | Age: 21
End: 2021-11-29
Payer: COMMERCIAL

## 2021-11-29 DIAGNOSIS — F31.32 BIPOLAR 1 DISORDER, DEPRESSED, MODERATE (HCC): Primary | ICD-10-CM

## 2021-11-29 DIAGNOSIS — F41.0 GENERALIZED ANXIETY DISORDER WITH PANIC ATTACKS: ICD-10-CM

## 2021-11-29 DIAGNOSIS — F41.1 GENERALIZED ANXIETY DISORDER WITH PANIC ATTACKS: ICD-10-CM

## 2021-11-29 PROCEDURE — 99213 OFFICE O/P EST LOW 20 MIN: CPT

## 2021-11-29 RX ORDER — LURASIDONE HYDROCHLORIDE 40 MG/1
40 TABLET, FILM COATED ORAL
Qty: 30 TABLET | Refills: 1 | Status: SHIPPED | OUTPATIENT
Start: 2021-11-29 | End: 2022-01-04

## 2021-11-29 RX ORDER — ESCITALOPRAM OXALATE 5 MG/1
5 TABLET ORAL DAILY
Qty: 30 TABLET | Refills: 1 | Status: SHIPPED | OUTPATIENT
Start: 2021-11-29 | End: 2022-06-14 | Stop reason: SINTOL

## 2021-11-29 RX ORDER — VENLAFAXINE HYDROCHLORIDE 150 MG/1
150 CAPSULE, EXTENDED RELEASE ORAL DAILY
Qty: 30 CAPSULE | Refills: 1 | Status: SHIPPED | OUTPATIENT
Start: 2021-11-29 | End: 2022-01-26

## 2021-11-29 RX ORDER — BUSPIRONE HYDROCHLORIDE 10 MG/1
10 TABLET ORAL 2 TIMES DAILY
Qty: 60 TABLET | Refills: 1 | Status: SHIPPED | OUTPATIENT
Start: 2021-11-29 | End: 2022-01-26

## 2021-12-02 ENCOUNTER — TELEPHONE (OUTPATIENT)
Dept: PSYCHIATRY | Facility: CLINIC | Age: 21
End: 2021-12-02

## 2022-01-03 DIAGNOSIS — F31.32 BIPOLAR 1 DISORDER, DEPRESSED, MODERATE (HCC): ICD-10-CM

## 2022-01-04 RX ORDER — LURASIDONE HYDROCHLORIDE 40 MG/1
TABLET, FILM COATED ORAL
Qty: 30 TABLET | Refills: 1 | Status: SHIPPED | OUTPATIENT
Start: 2022-01-04 | End: 2022-01-27

## 2022-01-04 NOTE — TELEPHONE ENCOUNTER
Patient was scheduled for 01/03/2021 office called patient to reschedule due to provider being out of office  Voicemail was left for the patient to call the office

## 2022-01-26 DIAGNOSIS — F41.0 GENERALIZED ANXIETY DISORDER WITH PANIC ATTACKS: ICD-10-CM

## 2022-01-26 DIAGNOSIS — F41.1 GENERALIZED ANXIETY DISORDER WITH PANIC ATTACKS: ICD-10-CM

## 2022-01-26 DIAGNOSIS — F31.32 BIPOLAR 1 DISORDER, DEPRESSED, MODERATE (HCC): ICD-10-CM

## 2022-01-26 RX ORDER — BUSPIRONE HYDROCHLORIDE 10 MG/1
TABLET ORAL
Qty: 60 TABLET | Refills: 1 | Status: SHIPPED | OUTPATIENT
Start: 2022-01-26 | End: 2022-06-14 | Stop reason: SINTOL

## 2022-01-26 RX ORDER — VENLAFAXINE HYDROCHLORIDE 150 MG/1
CAPSULE, EXTENDED RELEASE ORAL
Qty: 30 CAPSULE | Refills: 1 | Status: SHIPPED | OUTPATIENT
Start: 2022-01-26 | End: 2022-04-11 | Stop reason: SDUPTHER

## 2022-01-27 DIAGNOSIS — F31.32 BIPOLAR 1 DISORDER, DEPRESSED, MODERATE (HCC): ICD-10-CM

## 2022-01-27 RX ORDER — LURASIDONE HYDROCHLORIDE 40 MG/1
TABLET, FILM COATED ORAL
Qty: 30 TABLET | Refills: 1 | Status: SHIPPED | OUTPATIENT
Start: 2022-01-27 | End: 2022-04-11 | Stop reason: SDUPTHER

## 2022-03-24 DIAGNOSIS — N92.6 IRREGULAR MENSES: ICD-10-CM

## 2022-03-24 DIAGNOSIS — N94.6 DYSMENORRHEA: ICD-10-CM

## 2022-03-24 RX ORDER — NORGESTREL AND ETHINYL ESTRADIOL 0.3-0.03MG
KIT ORAL
Qty: 84 TABLET | Refills: 3 | Status: SHIPPED | OUTPATIENT
Start: 2022-03-24

## 2022-04-11 DIAGNOSIS — F31.32 BIPOLAR 1 DISORDER, DEPRESSED, MODERATE (HCC): ICD-10-CM

## 2022-04-11 RX ORDER — VENLAFAXINE HYDROCHLORIDE 150 MG/1
150 CAPSULE, EXTENDED RELEASE ORAL DAILY
Qty: 30 CAPSULE | Refills: 1 | Status: SHIPPED | OUTPATIENT
Start: 2022-04-11 | End: 2022-06-14 | Stop reason: SDUPTHER

## 2022-06-14 ENCOUNTER — OFFICE VISIT (OUTPATIENT)
Dept: PSYCHIATRY | Facility: CLINIC | Age: 22
End: 2022-06-14
Payer: COMMERCIAL

## 2022-06-14 DIAGNOSIS — F31.32 BIPOLAR 1 DISORDER, DEPRESSED, MODERATE (HCC): Primary | ICD-10-CM

## 2022-06-14 DIAGNOSIS — F41.1 GENERALIZED ANXIETY DISORDER WITH PANIC ATTACKS: ICD-10-CM

## 2022-06-14 DIAGNOSIS — F41.0 GENERALIZED ANXIETY DISORDER WITH PANIC ATTACKS: ICD-10-CM

## 2022-06-14 PROCEDURE — 99213 OFFICE O/P EST LOW 20 MIN: CPT

## 2022-06-14 RX ORDER — VENLAFAXINE HYDROCHLORIDE 150 MG/1
150 CAPSULE, EXTENDED RELEASE ORAL DAILY
Qty: 30 CAPSULE | Refills: 1 | Status: SHIPPED | OUTPATIENT
Start: 2022-06-14 | End: 2022-08-08

## 2022-06-14 NOTE — PSYCH
Regular Visit    Problem List Items Addressed This Visit    None     Visit Diagnoses     Bipolar 1 disorder, depressed, moderate ([address] )    -  Primary    Relevant Medications    lurasidone (Latuda) 40 mg tablet    venlafaxine (EFFEXOR-XR) 150 mg 24 hr capsule    Generalized anxiety disorder with panic attacks        Relevant Medications    lurasidone (Latuda) 40 mg tablet    venlafaxine (EFFEXOR-XR) 150 mg 24 hr capsule             Encounter provider SERVANDO Griffiths    Provider located at   [address] [address] [phone]    Recent Visits  No visits were found meeting these conditions  Showing recent visits within past 7 days and meeting all other requirements  Today's Visits  Date Type Provider Dept   06/14/22 Office Visit SERVANDO Griffiths  [facility]   Showing today's visits and meeting all other requirements  Future Appointments  No visits were found meeting these conditions  Showing future appointments within next 150 days and meeting all other requirements       HPI     Current Outpatient Medications   Medication Sig Dispense Refill    lurasidone (Latuda) 40 mg tablet Take 1 tablet (40 mg total) by mouth daily with dinner 30 tablet 2    venlafaxine (EFFEXOR-XR) 150 mg 24 hr capsule Take 1 capsule (150 mg total) by mouth daily 30 capsule 1    albuterol (PROVENTIL HFA,VENTOLIN HFA) 90 mcg/act inhaler 2 puff(s)      diphenhydrAMINE (BENADRYL) 25 mg capsule Take 25 mg by mouth daily as needed      Low-Ogestrel 0 3-30 MG-MCG per tablet TAKE 1 TABLET BY MOUTH EVERY DAY 84 tablet 3     No current facility-administered medications for this visit         Review of Systems        MEDICATION MANAGEMENT NOTE        [facility]    Name and Date of Birth:  Destiny Fuentes 19 y o  2000 MRN: [id number]    Date of Visit: June 14, 2022    Allergies   Allergen Reactions    Zolpidem Hallucinations and Other (See Comments)     Other reaction(s): Hallucinations     SUBJECTIVE:    Alireza Abdi is seen today for a follow up for Bipolar Disorder and Generalized Anxiety Disorder  She continues to do very well since the last visit  Patient reports feeling stable on her psychiatric medications of Latuda 40 mg and Effexor 150 mg daily for management of bipolar disorder and generalized anxiety disorder  Feels this is the best combination medication she has been on and wants no changes in her regimen  She is scant in conversation but is denying all mood symptoms including severe mood swings, irritability, impulsivity, indiscretions, risky behavior, insomnia, and flight of ideas  Reports a significant decrease in depression from 08/10 to 2/10 with minimal and manageable symptoms at this time  Does not feel anxiety is a major concern at this time reports it does go up and down depending on life stressors  Reports her mother continues to manage her medications and provider encouraged patient to call ahead of time with medications as patient has run out in the past   Patient has a past history of suicidal ideation and cutting, denies all such behavior in the past 6 months  Continues to follow-up with her private therapist on a regular basis the patient denies all thoughts of self-harm or suicidal ideation  She denies any side effects from medications  PLAN:    Continue Latuda to 40mg, 60mg in the past caused intolerable side effetcs  Continue Effexor 150mg daily, 200 mg in the past caused intolerable side effects  Bp WNL        Aware of 24 hour and weekend coverage for urgent situations accessed by calling [facility] main practice number  Continue psychotherapy with therapist    Diagnoses and all orders for this visit:    Bipolar 1 disorder, depressed, moderate ([facility] [address] )  -     lurasidone (Latuda) 40 mg tablet;  Take 1 tablet (40 mg total) by mouth daily with dinner  -     venlafaxine (EFFEXOR-XR) 150 mg 24 hr capsule; Take 1 capsule (150 mg total) by mouth daily    Generalized anxiety disorder with panic attacks        Current Outpatient Medications on File Prior to Visit   Medication Sig Dispense Refill    albuterol (PROVENTIL HFA,VENTOLIN HFA) 90 mcg/act inhaler 2 puff(s)      diphenhydrAMINE (BENADRYL) 25 mg capsule Take 25 mg by mouth daily as needed      Low-Ogestrel 0 3-30 MG-MCG per tablet TAKE 1 TABLET BY MOUTH EVERY DAY 84 tablet 3    [DISCONTINUED] busPIRone (BUSPAR) 10 mg tablet TAKE 1 TABLET BY MOUTH TWICE A DAY 60 tablet 1    [DISCONTINUED] escitalopram (LEXAPRO) 5 mg tablet Take 1 tablet (5 mg total) by mouth daily 30 tablet 1    [DISCONTINUED] lurasidone (Latuda) 40 mg tablet Take 1 tablet (40 mg total) by mouth daily with dinner 30 tablet 1    [DISCONTINUED] venlafaxine (EFFEXOR-XR) 150 mg 24 hr capsule Take 1 capsule (150 mg total) by mouth daily 30 capsule 1     No current facility-administered medications on file prior to visit  HPI ROS Appetite Changes and Sleep:     She reports normal sleep, adequate appetite, adequate energy level   Denies homicidal ideation, denies suicidal ideation    Review Of Systems:      HPI ROS:               Medication Side Effects:  denies    Depression (10 worst): 2/10    Anxiety (10 worst): 4/10    Safety concerns (SI, HI, etc): Denies si and hi    Sleep: 6-7 hrs    Energy: fair    Appetite: fair    Weight Change: denies        Mental Status Evaluation:    Appearance Adequate hygiene and grooming   Behavior calm and cooperative   Mood euthymic  Depression Scale - 2 of 10 (0 = No depression)  Anxiety Scale - 4 of 10 (0 = No anxiety)   Speech Normal rate and volume   Affect appropriate   Thought Processes Goal directed and coherent   Thought Content Does not verbalize delusional material   Associations Tightly connected   Perceptual Disturbances Denies hallucinations and does not appear to be responding to internal stimuli   Risk Potential Suicidal/Homicidal Ideation - No evidence of suicidal or homicidal ideation and patient does not verbalize suicidal or homicidal ideation  Risk of Violence - No evidence of risk for violence found on assessment  Risk of Self Mutilation - No evidence of risk for self mutilation found on assessment   Orientation oriented to person, place, time/date and situation   Memory recent and remote memory grossly intact   Consciousness alert and awake   Attention/Concentration attention span and concentration are age appropriate   Insight intact   Judgement intact   Muscle Strength and Gait normal muscle strength and normal muscle tone, normal gait/station and normal balance   Motor Activity no abnormal movements   Language no difficulty naming common objects, no difficulty repeating a phrase, no difficulty writing a sentence   Fund of Knowledge adequate knowledge of current events  adequate fund of knowledge regarding past history  adequate fund of knowledge regarding vocabulary      Past Psychiatric History Update:     Inpatient Psychiatric Admission Since Last Encounter:   no  Changes to Outpatient Psychiatric Treatment Team:    no  Suicide Attempt Or Self Mutilation Since Last Encounter:   no  Incidence of Violent Behavior Since Last Encounter:   no    Traumatic History Update:     New Onset of Abuse Since Last Encounter:   no  Traumatic Events Since Last Encounter:   no    Past Medical History:    Past Medical History:   Diagnosis Date    Depression     Psychiatric disorder     [facility]         History reviewed  No pertinent surgical history    Allergies   Allergen Reactions    Zolpidem Hallucinations and Other (See Comments)     Other reaction(s): Hallucinations     Substance Abuse History:    Social History     Substance and Sexual Activity   Alcohol Use No     Social History     Substance and Sexual Activity   Drug Use No     Social History:    Social History     Socioeconomic History    Marital status: Significant Other     Spouse name: Not on file    Number of children: Not on file    Years of education: Not on file    Highest education level: Not on file   Occupational History    Not on file   Tobacco Use    Smoking status: Never Smoker    Smokeless tobacco: Never Used   Vaping Use    Vaping Use: Never used   Substance and Sexual Activity    Alcohol use: No    Drug use: No    Sexual activity: Yes     Partners: Male     Birth control/protection: OCP   Other Topics Concern    Not on file   Social History Narrative    Not on file     Social Determinants of Health     Financial Resource Strain: Not on file   Food Insecurity: Not on file   Transportation Needs: Not on file   Physical Activity: Not on file   Stress: Not on file   Social Connections: Not on file   Intimate Partner Violence: Not on file   Housing Stability: Not on file     Family Psychiatric History:     Family History   Problem Relation Age of Onset    Leukemia Mother      History Review: The following portions of the patient's history were reviewed and updated as appropriate: allergies, current medications, past family history, past medical history, past social history, past surgical history and problem list     OBJECTIVE:     Vital signs in last 24 hours: There were no vitals filed for this visit  Laboratory Results:   Recent Labs (last 2 months):   No visits with results within 2 Month(s) from this visit     Latest known visit with results is:   Annual Exam on 09/21/2021   Component Date Value    Case Report 09/21/2021                      Value:Gynecologic Cytology Report                       Case: [id number]                                  Authorizing Provider:  Clinton Ferris PA-C   Collected:           09/21/2021 1553              Ordering Location:     [facility]  Received:            09/21/2021 1553 OBGYN                                                                        First Screen:          DELMY Ku                                                       Specimen:    LIQUID-BASED PAP, SCREENING, Cervix                                                        Primary Interpretation 09/21/2021 Negative for intraepithelial lesion or malignancy     Interpretation 09/21/2021 Shift in chris suggestive of bacterial vaginosis     Specimen Adequacy 09/21/2021 Satisfactory for evaluation  Endocervical/transformation zone component present   Additional Information 09/21/2021                      Value: This result contains rich text formatting which cannot be displayed here   LMP 09/21/2021 8/16/2021      I have personally reviewed all pertinent laboratory/tests results  Suicide/Homicide Risk Assessment:    Risk of Harm to Self:  Protective Factors: no current suicidal ideation, access to mental health treatment, compliant with medications, compliant with mental health treatment, connection to community  Based on today's assessment, Odette Porter presents the following risk of harm to self: minimal    Risk of Harm to Others:  Based on today's assessment, Odette Porter presents the following risk of harm to others: none    The following interventions are recommended: therapy appointment in 1 months    Medications Risks/Benefits:      Risks, Benefits And Possible Side Effects Of Medications:    Discussed risks and benefits of treatment with patient including risk of suicidality, serotonin syndrome, increased QTc interval and SIADH related to treatment with antidepressants;  Risk of induction of manic symptoms in certain patient populations and risk of parkinsonian symptoms, metabolic syndrome, tardive dyskinesia and neuroleptic malignant syndrome related to treatment with antipsychotic medications     Controlled Medication Discussion:     Not applicable    Treatment Plan:    Due for update/Updated:   yes  Last treatment plan done 9/2/21 by Daniel Villa  Treatment Plan due on 3/2/22      SERVANDO Saunders 06/14/22

## 2022-08-07 DIAGNOSIS — F31.32 BIPOLAR 1 DISORDER, DEPRESSED, MODERATE (HCC): ICD-10-CM

## 2022-08-08 RX ORDER — VENLAFAXINE HYDROCHLORIDE 150 MG/1
CAPSULE, EXTENDED RELEASE ORAL
Qty: 30 CAPSULE | Refills: 1 | Status: SHIPPED | OUTPATIENT
Start: 2022-08-08 | End: 2022-10-11 | Stop reason: SDUPTHER

## 2022-08-11 DIAGNOSIS — F31.32 BIPOLAR 1 DISORDER, DEPRESSED, MODERATE (HCC): ICD-10-CM

## 2022-08-11 RX ORDER — LURASIDONE HYDROCHLORIDE 40 MG/1
TABLET, FILM COATED ORAL
Qty: 30 TABLET | Refills: 2 | Status: SHIPPED | OUTPATIENT
Start: 2022-08-11 | End: 2022-10-11 | Stop reason: SDUPTHER

## 2022-09-26 NOTE — PROGRESS NOTES
Assessment/Plan   Problem List Items Addressed This Visit        Other    Well woman exam - Primary      Other Visit Diagnoses     Galactorrhea        Relevant Orders    Prolactin    Easy bruising        Relevant Orders    CBC and differential    BRYSON (stress urinary incontinence, female)              Discussion    All questions have been answered to her satisfaction  RTO for APE or sooner if needed  Pap deferred  Pt has stopped her OCPs  Declines need for [facility] restart  Will watch cycle and consider restart with alternate pills if needed  Will plan prolactin due to galactorrhea  Will plan CBC due to frequent, easy bruising  Subjective     HPI   Dong Fuentes is a 19 y o  female who presents for annual well woman exam  She presents with her female partner "Rah"  LMP -9/20/22 ; Periods have been more irregular since stopping the pills  Will get a cycle q 2 months average  She stoppeed due to increased depression that she felt on the pillos  Pt has stopped her OCPs  Declines need for [facility] restart  Will watch cycle and consider restart with alternate pills if needed  Pt does also note some occasional BRYSON  Advised bladder irritants to avoid, including but not limited to alcohol, caffeine, tobacco and acidic foods/beverasges, advised timed bladder emptying, weight loss and kegels  If sx do not improve can consider pelvic floor PT consult  (+) SBEs -she is still able to occasionally feel her left breast mass  Has US done that showed it to likely be a lymph node  She has not had f/u to ensure stability  I reprinted slip and urged her to go  Pt with complaints of b/l breast leakage that has continued as well R>L  Appears mostly when expressed  Is milky white  Has been going on for the last almost 2 years  We reviewed medical history and medication usage  Pt is sexually active in a mutually monog same sex sexual relationship; No issues with intercourse;  She declines sti/hiv/hep testing; Feels safe at home    Pt does endorse frequent bruising  Not in proportion to any trauma or bumps she may have experienced  Pt also with complaints of chronic low back pain with pain shooting down her back and legs  No trauma to the area  No pelvic pain or cyclic nature to her LBP  Will plan PCP follow up  (+) PCP for routine Bw/care; Last Pap - 21 WNL + BV       Review of Systems   Constitutional: Negative  Respiratory: Negative  Gastrointestinal: Negative  Endocrine: Negative  Genitourinary: Negative  The following portions of the patient's history were reviewed and updated as appropriate: allergies, current medications, past family history, past medical history, past social history, past surgical history and problem list          OB History        1    Para   0    Term   0       0    AB   1    Living   0       SAB   0    IAB   1    Ectopic   0    Multiple   0    Live Births   0                 Past Medical History:   Diagnosis Date    Depression     Psychiatric disorder     Schizophrenia ([facility] [address] )        History reviewed  No pertinent surgical history      Family History   Problem Relation Age of Onset    Leukemia Mother        Social History     Socioeconomic History    Marital status: Significant Other     Spouse name: Not on file    Number of children: Not on file    Years of education: Not on file    Highest education level: Not on file   Occupational History    Not on file   Tobacco Use    Smoking status: Never Smoker    Smokeless tobacco: Never Used   Vaping Use    Vaping Use: Never used   Substance and Sexual Activity    Alcohol use: No    Drug use: No    Sexual activity: Yes     Partners: Female   Other Topics Concern    Not on file   Social History Narrative    Not on file     Social Determinants of Health     Financial Resource Strain: Not on file   Food Insecurity: Not on file   Transportation Needs: Not on file   Physical Activity: Not on file   Stress: Not on file   Social Connections: Not on file   Intimate Partner Violence: Not on file   Housing Stability: Not on file         Current Outpatient Medications:     albuterol (PROVENTIL HFA,VENTOLIN HFA) 90 mcg/act inhaler, 2 puff(s), Disp: , Rfl:     diphenhydrAMINE (BENADRYL) 25 mg capsule, Take 25 mg by mouth daily as needed, Disp: , Rfl:     Latuda 40 MG tablet, TAKE 1 TABLET BY MOUTH DAILY WITH DINNER, Disp: 30 tablet, Rfl: 2    venlafaxine (EFFEXOR-XR) 150 mg 24 hr capsule, TAKE 1 CAPSULE BY MOUTH EVERY DAY, Disp: 30 capsule, Rfl: 1    Low-Ogestrel 0 3-30 MG-MCG per tablet, TAKE 1 TABLET BY MOUTH EVERY DAY (Patient not taking: Reported on 9/27/2022), Disp: 84 tablet, Rfl: 3    Allergies   Allergen Reactions    Zolpidem Hallucinations and Other (See Comments)     Other reaction(s): Hallucinations       Objective   Vitals:    09/27/22 1208   BP: 116/78   BP Location: Left arm   Patient Position: Sitting   Cuff Size: Large   Weight: 132 kg (291 lb)   Height: 5' 9" (1 753 m)     Physical Exam  Vitals reviewed  Constitutional:       Appearance: She is well-developed  HENT:      Head: Normocephalic and atraumatic  Cardiovascular:      Rate and Rhythm: Normal rate and regular rhythm  Pulmonary:      Effort: Pulmonary effort is normal       Breath sounds: Normal breath sounds  Chest:   Breasts: Breasts are symmetrical       Right: Nipple discharge present  No inverted nipple, mass, skin change or tenderness  Left: No inverted nipple, mass, nipple discharge, skin change or tenderness  Comments: Right sided nipple d/c noted w expression  Clear to milky, no blood noted  None noted left side  Abdominal:      General: There is no distension  Palpations: Abdomen is soft  There is no mass  Tenderness: There is no right CVA tenderness, left CVA tenderness, guarding or rebound  Genitourinary:     Exam position: Supine  Labia:         Right: No rash           Left: No rash        Vagina: No signs of injury and foreign body  No vaginal discharge or erythema  Cervix: No cervical motion tenderness  Adnexa:         Right: No mass  Left: No mass  Skin:     General: Skin is warm and dry  Neurological:      Mental Status: She is alert and oriented to person, place, and time  Psychiatric:         Mood and Affect: Mood normal          Behavior: Behavior normal          Thought Content: Thought content normal          Judgment: Judgment normal          There are no Patient Instructions on file for this visit

## 2022-09-27 ENCOUNTER — ANNUAL EXAM (OUTPATIENT)
Dept: OBGYN CLINIC | Facility: CLINIC | Age: 22
End: 2022-09-27
Payer: COMMERCIAL

## 2022-09-27 VITALS
BODY MASS INDEX: 43.1 KG/M2 | DIASTOLIC BLOOD PRESSURE: 78 MMHG | WEIGHT: 291 LBS | HEIGHT: 69 IN | SYSTOLIC BLOOD PRESSURE: 116 MMHG

## 2022-09-27 DIAGNOSIS — N64.3 GALACTORRHEA: ICD-10-CM

## 2022-09-27 DIAGNOSIS — N39.3 SUI (STRESS URINARY INCONTINENCE, FEMALE): ICD-10-CM

## 2022-09-27 DIAGNOSIS — R23.3 EASY BRUISING: ICD-10-CM

## 2022-09-27 DIAGNOSIS — Z01.419 WELL WOMAN EXAM: Primary | ICD-10-CM

## 2022-09-27 PROCEDURE — 99395 PREV VISIT EST AGE 18-39: CPT | Performed by: PHYSICIAN ASSISTANT

## 2022-09-27 PROCEDURE — 0503F POSTPARTUM CARE VISIT: CPT | Performed by: PHYSICIAN ASSISTANT

## 2022-10-11 ENCOUNTER — OFFICE VISIT (OUTPATIENT)
Dept: PSYCHIATRY | Facility: CLINIC | Age: 22
End: 2022-10-11
Payer: COMMERCIAL

## 2022-10-11 DIAGNOSIS — F41.0 GENERALIZED ANXIETY DISORDER WITH PANIC ATTACKS: ICD-10-CM

## 2022-10-11 DIAGNOSIS — R41.840 POOR CONCENTRATION: ICD-10-CM

## 2022-10-11 DIAGNOSIS — F41.1 GENERALIZED ANXIETY DISORDER WITH PANIC ATTACKS: ICD-10-CM

## 2022-10-11 DIAGNOSIS — F31.32 BIPOLAR 1 DISORDER, DEPRESSED, MODERATE (HCC): Primary | ICD-10-CM

## 2022-10-11 PROCEDURE — 99213 OFFICE O/P EST LOW 20 MIN: CPT

## 2022-10-11 RX ORDER — ATOMOXETINE 25 MG/1
25 CAPSULE ORAL DAILY
Qty: 30 CAPSULE | Refills: 1 | Status: SHIPPED | OUTPATIENT
Start: 2022-10-11

## 2022-10-11 RX ORDER — VENLAFAXINE HYDROCHLORIDE 150 MG/1
150 CAPSULE, EXTENDED RELEASE ORAL DAILY
Qty: 30 CAPSULE | Refills: 1 | Status: SHIPPED | OUTPATIENT
Start: 2022-10-11

## 2022-10-11 RX ORDER — HYDROXYZINE 50 MG/1
50 TABLET, FILM COATED ORAL EVERY 6 HOURS PRN
Qty: 90 TABLET | Refills: 0 | Status: SHIPPED | OUTPATIENT
Start: 2022-10-11

## 2022-10-11 NOTE — PSYCH
Regular Visit    Problem List Items Addressed This Visit    None     Visit Diagnoses     Bipolar 1 disorder, depressed, moderate (HCC)    -  Primary    Relevant Medications    atoMOXetine (STRATTERA) 25 mg capsule    venlafaxine (EFFEXOR-XR) 150 mg 24 hr capsule    lurasidone (Latuda) 40 mg tablet    hydrOXYzine HCL (ATARAX) 50 mg tablet    Generalized anxiety disorder with panic attacks        Relevant Medications    atoMOXetine (STRATTERA) 25 mg capsule    venlafaxine (EFFEXOR-XR) 150 mg 24 hr capsule    lurasidone (Latuda) 40 mg tablet    hydrOXYzine HCL (ATARAX) 50 mg tablet    Poor concentration        Relevant Medications    atoMOXetine (STRATTERA) 25 mg capsule             Encounter provider SERVANDO Murphy    Provider located at    [address] [place] [address]  [address] [facility] [place] [address] [phone]    Recent Visits  No visits were found meeting these conditions  Showing recent visits within past 7 days and meeting all other requirements  Today's Visits  Date Type Provider Dept   10/11/22 [facility]   Showing today's visits and meeting all other requirements  Future Appointments  No visits were found meeting these conditions    Showing future appointments within next 150 days and meeting all other requirements       HPI     Current Outpatient Medications   Medication Sig Dispense Refill   • atoMOXetine (STRATTERA) 25 mg capsule Take 1 capsule (25 mg total) by mouth daily 30 capsule 1   • diphenhydrAMINE (BENADRYL) 25 mg capsule Take 25 mg by mouth daily as needed     • hydrOXYzine HCL (ATARAX) 50 mg tablet Take 1 tablet (50 mg total) by mouth every 6 (six) hours as needed for anxiety 90 tablet 0   • lurasidone (Latuda) 40 mg tablet Take 1 tablet (40 mg total) by mouth daily with dinner 30 tablet 1   • venlafaxine (EFFEXOR-XR) 150 mg 24 hr capsule Take 1 capsule (150 mg total) by mouth daily 30 capsule 1   • albuterol (PROVENTIL HFA,VENTOLIN HFA) 90 mcg/act inhaler 2 puff(s)     • Low-Ogestrel 0 3-30 MG-MCG per tablet TAKE 1 TABLET BY MOUTH EVERY DAY (Patient not taking: Reported on 9/27/2022) 84 tablet 3     No current facility-administered medications for this visit  Review of Systems        MEDICATION MANAGEMENT NOTE        [address] [place] [address]    Name and Date of Birth:  Liane Zacarias 19 y o  2000 MRN: [id number]    Date of Visit: October 11, 2022    Allergies   Allergen Reactions   • Zolpidem Hallucinations and Other (See Comments)     Other reaction(s): Hallucinations     SUBJECTIVE:    Claire Serrano is seen today for a follow up for Bipolar Disorder and Generalized Anxiety Disorder  She continues to do relatively well since the last visit  Patient reports mood has been stabilized and denies all recent symptoms of bipolar disorder including impulsivity, mood swings, grandiosity, increased energy  Continues with Latuda 40 mg daily with no side effects  Feels depression is 4/10 and controlled with Effexor   Reports worsening attention + concentration issues including being forgetful in her daily life, losing  phone all the time, inability to maintain attention for long periods of time, inability to follow conversations, being easily overstimulated  Patient was given resources if she would like to pursue official ADHD testing  These issues are contributing to poor work performance, she is requesting something to help now  Strattera 25 mg initiated for lack of concentration and patient agreed to plan  Will not pursue further medication untill ADHD testing is complete and patient verbalized understanding  Denies any further major stressors in her life and overall feels stable  Denies SI  Denies self-harm behaviors   Continue to follow-up with private psychotherapist       She denies any side effects from medications  PLAN:    Continue Latuda to 40mg  Denies all side effects, takes with food  Continue Effexor 150mg daily, 200 mg in the past caused intolerable side effects  Initiate Strattera 25 mg for poor concentration and attention symptoms, pursuing official ADHD testing  Aware of 24 hour and weekend coverage for urgent situations accessed by calling [facility] main practice number  Continue psychotherapy with therapist    Diagnoses and all orders for this visit:    Bipolar 1 disorder, depressed, moderate ([address] )  -     venlafaxine (EFFEXOR-XR) 150 mg 24 hr capsule; Take 1 capsule (150 mg total) by mouth daily  -     lurasidone (Latuda) 40 mg tablet; Take 1 tablet (40 mg total) by mouth daily with dinner  -     hydrOXYzine HCL (ATARAX) 50 mg tablet; Take 1 tablet (50 mg total) by mouth every 6 (six) hours as needed for anxiety    Generalized anxiety disorder with panic attacks    Poor concentration  -     atoMOXetine (STRATTERA) 25 mg capsule; Take 1 capsule (25 mg total) by mouth daily        Current Outpatient Medications on File Prior to Visit   Medication Sig Dispense Refill   • diphenhydrAMINE (BENADRYL) 25 mg capsule Take 25 mg by mouth daily as needed     • [DISCONTINUED] Latuda 40 MG tablet TAKE 1 TABLET BY MOUTH DAILY WITH DINNER 30 tablet 2   • [DISCONTINUED] venlafaxine (EFFEXOR-XR) 150 mg 24 hr capsule TAKE 1 CAPSULE BY MOUTH EVERY DAY 30 capsule 1   • albuterol (PROVENTIL HFA,VENTOLIN HFA) 90 mcg/act inhaler 2 puff(s)     • Low-Ogestrel 0 3-30 MG-MCG per tablet TAKE 1 TABLET BY MOUTH EVERY DAY (Patient not taking: Reported on 9/27/2022) 84 tablet 3     No current facility-administered medications on file prior to visit  HPI ROS Appetite Changes and Sleep:     She reports normal sleep, adequate appetite, adequate energy level   Denies homicidal ideation, denies suicidal ideation    Review Of Systems:      HPI ROS:               Medication Side Effects:  denies Depression (10 worst): 4/10    Anxiety (10 worst): 4/10    Safety concerns (SI, HI, etc): Denies si and hi    Sleep: 6-7 hrs    Energy: fair    Appetite: 2-3 meals daily    Weight Change: denies        Mental Status Evaluation:    Appearance Adequate hygiene and grooming   Behavior calm and cooperative   Mood anxious and depressed  Depression Scale - 4 of 10 (0 = No depression)  Anxiety Scale - 4 of 10 (0 = No anxiety)   Speech Normal rate and volume   Affect appropriate   Thought Processes Goal directed and coherent   Thought Content Does not verbalize delusional material   Associations Tightly connected   Perceptual Disturbances Denies hallucinations and does not appear to be responding to internal stimuli   Risk Potential Suicidal/Homicidal Ideation - No evidence of suicidal or homicidal ideation and patient does not verbalize suicidal or homicidal ideation  Risk of Violence - No evidence of risk for violence found on assessment  Risk of Self Mutilation - No evidence of risk for self mutilation found on assessment   Orientation oriented to person, place, time/date and situation   Memory recent and remote memory grossly intact   Consciousness alert and awake   Attention/Concentration attention span and concentration appear shorter than expected for age   Insight intact   Judgement intact   Muscle Strength and Gait normal muscle strength and normal muscle tone, normal gait/station and normal balance   Motor Activity no abnormal movements   Language no difficulty naming common objects, no difficulty repeating a phrase, no difficulty writing a sentence   Fund of Knowledge adequate knowledge of current events  adequate fund of knowledge regarding past history  adequate fund of knowledge regarding vocabulary      Past Psychiatric History Update:     Inpatient Psychiatric Admission Since Last Encounter:   no  Changes to Outpatient Psychiatric Treatment Team:    no  Suicide Attempt Or Self Mutilation Since Last Encounter:   no  Incidence of Violent Behavior Since Last Encounter:   no    Traumatic History Update:     New Onset of Abuse Since Last Encounter:   no  Traumatic Events Since Last Encounter:   no    Past Medical History:    Past Medical History:   Diagnosis Date   • Depression    • Psychiatric disorder    • Schizophrenia ([facility] )         History reviewed  No pertinent surgical history  Allergies   Allergen Reactions   • Zolpidem Hallucinations and Other (See Comments)     Other reaction(s): Hallucinations     Substance Abuse History:    Social History     Substance and Sexual Activity   Alcohol Use No     Social History     Substance and Sexual Activity   Drug Use No     Social History:    Social History     Socioeconomic History   • Marital status: Significant Other     Spouse name: Not on file   • Number of children: Not on file   • Years of education: Not on file   • Highest education level: Not on file   Occupational History   • Not on file   Tobacco Use   • Smoking status: Never Smoker   • Smokeless tobacco: Never Used   Vaping Use   • Vaping Use: Never used   Substance and Sexual Activity   • Alcohol use: No   • Drug use: No   • Sexual activity: Yes     Partners: Female   Other Topics Concern   • Not on file   Social History Narrative   • Not on file     Social Determinants of Health     Financial Resource Strain: Not on file   Food Insecurity: Not on file   Transportation Needs: Not on file   Physical Activity: Not on file   Stress: Not on file   Social Connections: Not on file   Intimate Partner Violence: Not on file   Housing Stability: Not on file     Family Psychiatric History:     Family History   Problem Relation Age of Onset   • Leukemia Mother      History Review: The following portions of the patient's history were reviewed and updated as appropriate: allergies, current medications, past family history, past medical history, past social history, past surgical history and problem list     OBJECTIVE: Vital signs in last 24 hours: There were no vitals filed for this visit  Laboratory Results:   Recent Labs (last 2 months):   No visits with results within 2 Month(s) from this visit  Latest known visit with results is:   Annual Exam on 09/21/2021   Component Date Value   • Case Report 09/21/2021                      Value:Gynecologic Cytology Report                       Case: [id number]                                  Authorizing Provider:  Mone Sepulveda PA-C   Collected:           09/21/2021 1553              Ordering Location:     [facility]  Received:            09/21/2021 1553                                     OBGYN                                                                        First Screen:          DELMY Irwin                                                       Specimen:    LIQUID-BASED PAP, SCREENING, Cervix                                                       • Primary Interpretation 09/21/2021 Negative for intraepithelial lesion or malignancy    • Interpretation 09/21/2021 Shift in chris suggestive of bacterial vaginosis    • Specimen Adequacy 09/21/2021 Satisfactory for evaluation  Endocervical/transformation zone component present  • Additional Information 09/21/2021                      Value: This result contains rich text formatting which cannot be displayed here  • LMP 09/21/2021 8/16/2021      I have personally reviewed all pertinent laboratory/tests results      Suicide/Homicide Risk Assessment:    Risk of Harm to Self:  Protective Factors: no current suicidal ideation, access to mental health treatment, compliant with medications, compliant with mental health treatment  Based on today's assessment, Hiram Ulrich presents the following risk of harm to self: minimal    Risk of Harm to Others:  Based on today's assessment, Hiram Ulrich presents the following risk of harm to others: minimal    The following interventions are recommended: therapy appointment in 2 weeks    Medications Risks/Benefits:      Risks, Benefits And Possible Side Effects Of Medications:    Discussed risks and benefits of treatment with patient including risk of suicidality, serotonin syndrome, increased QTc interval and SIADH related to treatment with antidepressants; Risk of induction of manic symptoms in certain patient populations and risk of parkinsonian symptoms, metabolic syndrome, tardive dyskinesia and neuroleptic malignant syndrome related to treatment with antipsychotic medications     Controlled Medication Discussion:     Not applicable    Treatment Plan:    Due for update/Updated:   yes  Last treatment plan done 10/11/22 by amita ojeda  Treatment Plan due on 4/11/23      SERVANDO Kennedy 10/11/22

## 2022-10-11 NOTE — BH TREATMENT PLAN
TREATMENT PLAN (Medication Management Only)        [address] [place] [address]    Name and Date of Birth:  Josafat Bryan 19 y o  2000  Date of Treatment Plan: October 11, 2022  Diagnosis/Diagnoses:    1  Bipolar 1 disorder, depressed, moderate ([address] )    2  Generalized anxiety disorder with panic attacks    3  Poor concentration      Strengths/Personal Resources for Self-Care: supportive family, supportive friends, taking medications as prescribed  Area/Areas of need (in own words): anxiety symptoms, depressive symptoms, mood instability, ADHD symptoms  1  Long Term Goal: continue improvement in mood stability  Target Date:6 months - 4/11/2023  Person/Persons responsible for completion of goal: Laurence  2  Short Term Objective (s) - How will we reach this goal?:   A  Provider new recommended medication/dosage changes and/or continue medication(s): continue current medications as prescribed  B  Attend medication management appointments regularly  C  Take psychiatric medications responsibly  Target Date:6 months - 4/11/2023  Person/Persons Responsible for Completion of Goal: Laurence and amita ojeda  Progress Towards Goals: continuing treatment  Treatment Modality: medication management every 2 months  Review due 180 days from date of this plan: 6 months - 4/11/2023  Expected length of service: maintenance  My Physician/PA/NP and I have developed this plan together and I agree to work on the goals and objectives  I understand the treatment goals that were developed for my treatment

## 2022-10-30 DIAGNOSIS — F31.32 BIPOLAR 1 DISORDER, DEPRESSED, MODERATE (HCC): ICD-10-CM

## 2022-10-31 RX ORDER — HYDROXYZINE 50 MG/1
TABLET, FILM COATED ORAL
Qty: 90 TABLET | Refills: 0 | Status: SHIPPED | OUTPATIENT
Start: 2022-10-31

## 2022-11-30 DIAGNOSIS — F31.32 BIPOLAR 1 DISORDER, DEPRESSED, MODERATE (HCC): ICD-10-CM

## 2022-11-30 RX ORDER — HYDROXYZINE 50 MG/1
TABLET, FILM COATED ORAL
Qty: 90 TABLET | Refills: 0 | Status: SHIPPED | OUTPATIENT
Start: 2022-11-30

## 2022-12-06 ENCOUNTER — OFFICE VISIT (OUTPATIENT)
Dept: PSYCHIATRY | Facility: CLINIC | Age: 22
End: 2022-12-06

## 2022-12-06 ENCOUNTER — TELEPHONE (OUTPATIENT)
Dept: PSYCHIATRY | Facility: CLINIC | Age: 22
End: 2022-12-06

## 2022-12-06 VITALS — SYSTOLIC BLOOD PRESSURE: 135 MMHG | DIASTOLIC BLOOD PRESSURE: 86 MMHG

## 2022-12-06 DIAGNOSIS — F41.0 GENERALIZED ANXIETY DISORDER WITH PANIC ATTACKS: ICD-10-CM

## 2022-12-06 DIAGNOSIS — R41.840 POOR CONCENTRATION: ICD-10-CM

## 2022-12-06 DIAGNOSIS — F31.32 BIPOLAR 1 DISORDER, DEPRESSED, MODERATE (HCC): Primary | ICD-10-CM

## 2022-12-06 DIAGNOSIS — F41.1 GENERALIZED ANXIETY DISORDER WITH PANIC ATTACKS: ICD-10-CM

## 2022-12-06 RX ORDER — VENLAFAXINE HYDROCHLORIDE 150 MG/1
150 CAPSULE, EXTENDED RELEASE ORAL DAILY
Qty: 90 CAPSULE | Refills: 0 | Status: SHIPPED | OUTPATIENT
Start: 2022-12-06

## 2022-12-06 RX ORDER — ATOMOXETINE 40 MG/1
40 CAPSULE ORAL DAILY
Qty: 90 CAPSULE | Refills: 0 | Status: SHIPPED | OUTPATIENT
Start: 2022-12-06

## 2022-12-06 NOTE — PSYCH
Regular Visit    Problem List Items Addressed This Visit    None  Visit Diagnoses     Bipolar 1 disorder, depressed, moderate (HCC)    -  Primary    Relevant Medications    atoMOXetine (STRATTERA) 40 mg capsule    venlafaxine (EFFEXOR-XR) 150 mg 24 hr capsule    lurasidone (Latuda) 40 mg tablet    Other Relevant Orders    CBC and differential    Basic metabolic panel    Lipid Panel with Direct LDL reflex    HEMOGLOBIN A1C W/ EAG ESTIMATION    TSH, 3rd generation with Free T4 reflex    Comprehensive metabolic panel    Generalized anxiety disorder with panic attacks        Relevant Medications    atoMOXetine (STRATTERA) 40 mg capsule    venlafaxine (EFFEXOR-XR) 150 mg 24 hr capsule    lurasidone (Latuda) 40 mg tablet    Poor concentration        Relevant Medications    atoMOXetine (STRATTERA) 40 mg capsule             Encounter provider SERVANDO Kennedy    Provider located at    [address] [place] [id number] [phone]    Recent Visits  No visits were found meeting these conditions  Showing recent visits within past 7 days and meeting all other requirements  Today's Visits  Date Type Provider Dept   12/06/22 Office Visit SERVANDO Kennedy  [facility]   Showing today's visits and meeting all other requirements  Future Appointments  No visits were found meeting these conditions    Showing future appointments within next 150 days and meeting all other requirements       HPI     Current Outpatient Medications   Medication Sig Dispense Refill   • albuterol (PROVENTIL HFA,VENTOLIN HFA) 90 mcg/act inhaler 2 puff(s)     • atoMOXetine (STRATTERA) 40 mg capsule Take 1 capsule (40 mg total) by mouth daily 90 capsule 0   • hydrOXYzine HCL (ATARAX) 50 mg tablet TAKE 1 TABLET BY MOUTH EVERY 6 HOURS AS NEEDED FOR ANXIETY 90 tablet 0   • lurasidone (Latuda) 40 mg tablet Take 1 tablet (40 mg total) by mouth daily with dinner 90 tablet 0   • venlafaxine (EFFEXOR-XR) 150 mg 24 hr capsule Take 1 capsule (150 mg total) by mouth daily 90 capsule 0   • diphenhydrAMINE (BENADRYL) 25 mg capsule Take 25 mg by mouth daily as needed     • Low-Ogestrel 0 3-30 MG-MCG per tablet TAKE 1 TABLET BY MOUTH EVERY DAY (Patient not taking: Reported on 9/27/2022) 84 tablet 3     No current facility-administered medications for this visit  Review of Systems        MEDICATION MANAGEMENT NOTE        [address] [place] [address]    Name and Date of Birth:  Delio Hickman 23 y o  2000 MRN: [id number]    Date of Visit: December 6, 2022    Allergies   Allergen Reactions   • Zolpidem Hallucinations and Other (See Comments)     Other reaction(s): Hallucinations     SUBJECTIVE:    Lin Wynn is seen today for a follow up for Bipolar Disorder and Generalized Anxiety Disorder  She continues to do fairly well since the last visit  Reports mild seasonal depression exacerbating chronic depression with symptoms of decreased energy and motivation, irritability  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 is always extremely busy and overwhelming  Denies panic attacks or overwhelmingl anxiety, appears to be managing the stress with the current medication regimen  Strattera initiated last visit for lack of concentration, reports medication has been very helpful and titrated to 40 mg daily today  Has yet to pursue official ADHD testing but plans to in the near future  Denies any exacerbation in terms of bipolar 1 disorder, denies grandiosity, impulsivity, risk behaviors, severe mood swings, insomnia  Mood was slightly dysphoric, however forward thinking and looking forward to holidays  Interested in reinitiating psychotherapy, added to the wait list and given further resources for therapy  Denies SI  She denies any side effects from medications  PLAN:    Continue Latuda to 40mg   Denies all side effects, takes with food  -Complete pending antipsychotic blood work before next visit  Continue Effexor 150mg daily, 200 mg in the past caused intolerable side effects  BP today-135/86  Titrate Strattera to 40 mg for poor concentration and attention symptoms, pursuing official ADHD testing                  Aware of 24 hour and weekend coverage for urgent situations accessed by calling [facility] main practice number  Referral for individual psychotherapy    Diagnoses and all orders for this visit:    Bipolar 1 disorder, depressed, moderate (HCC)  -     venlafaxine (EFFEXOR-XR) 150 mg 24 hr capsule; Take 1 capsule (150 mg total) by mouth daily  -     lurasidone (Latuda) 40 mg tablet; Take 1 tablet (40 mg total) by mouth daily with dinner  -     CBC and differential; Future  -     Basic metabolic panel; Future  -     Lipid Panel with Direct LDL reflex; Future  -     HEMOGLOBIN A1C W/ EAG ESTIMATION; Future  -     TSH, 3rd generation with Free T4 reflex; Future  -     Comprehensive metabolic panel; Future    Generalized anxiety disorder with panic attacks    Poor concentration  -     atoMOXetine (STRATTERA) 40 mg capsule;  Take 1 capsule (40 mg total) by mouth daily        Current Outpatient Medications on File Prior to Visit   Medication Sig Dispense Refill   • albuterol (PROVENTIL HFA,VENTOLIN HFA) 90 mcg/act inhaler 2 puff(s)     • hydrOXYzine HCL (ATARAX) 50 mg tablet TAKE 1 TABLET BY MOUTH EVERY 6 HOURS AS NEEDED FOR ANXIETY 90 tablet 0   • diphenhydrAMINE (BENADRYL) 25 mg capsule Take 25 mg by mouth daily as needed     • Low-Ogestrel 0 3-30 MG-MCG per tablet TAKE 1 TABLET BY MOUTH EVERY DAY (Patient not taking: Reported on 9/27/2022) 84 tablet 3   • [DISCONTINUED] atoMOXetine (STRATTERA) 25 mg capsule Take 1 capsule (25 mg total) by mouth daily 30 capsule 1   • [DISCONTINUED] lurasidone (Latuda) 40 mg tablet Take 1 tablet (40 mg total) by mouth daily with dinner 30 tablet 1   • [DISCONTINUED] venlafaxine (EFFEXOR-XR) 150 mg 24 hr capsule Take 1 capsule (150 mg total) by mouth daily 30 capsule 1     No current facility-administered medications on file prior to visit  HPI ROS Appetite Changes and Sleep:     She reports normal sleep, adequate appetite, low energy   Denies homicidal ideation, denies suicidal ideation    Review Of Systems:      HPI ROS:               Medication Side Effects:  denies    Depression (10 worst): 4/10    Anxiety (10 worst): 4/10    Safety concerns (SI, HI, etc): Denies si and hi    Sleep: 6 hrs    Energy: Fair to low    Appetite: 2-3 meals    Weight Change: denies        Mental Status Evaluation:    Appearance Adequate hygiene and grooming   Behavior calm and cooperative   Mood anxious and depressed  Depression Scale - 4 of 10 (0 = No depression)  Anxiety Scale - 4 of 10 (0 = No anxiety)   Speech Normal rate and volume   Affect constricted   Thought Processes Goal directed and coherent   Thought Content Does not verbalize delusional material   Associations Tightly connected   Perceptual Disturbances Denies hallucinations and does not appear to be responding to internal stimuli   Risk Potential Suicidal/Homicidal Ideation - No evidence of suicidal or homicidal ideation and patient does not verbalize suicidal or homicidal ideation  Risk of Violence - No evidence of risk for violence found on assessment  Risk of Self Mutilation - No evidence of risk for self mutilation found on assessment   Orientation oriented to person, place, time/date and situation   Memory recent and remote memory grossly intact   Consciousness alert and awake   Attention/Concentration attention span and concentration appear shorter than expected for age   Insight intact   Judgement intact   Muscle Strength and Gait normal muscle strength and normal muscle tone, normal gait/station and normal balance   Motor Activity no abnormal movements   Language no difficulty naming common objects, no difficulty repeating a phrase, no difficulty writing a sentence   Fund of Knowledge adequate knowledge of current events  adequate fund of knowledge regarding past history  adequate fund of knowledge regarding vocabulary      Past Psychiatric History Update:     Inpatient Psychiatric Admission Since Last Encounter:   no  Changes to Outpatient Psychiatric Treatment Team:    no  Suicide Attempt Or Self Mutilation Since Last Encounter:   no  Incidence of Violent Behavior Since Last Encounter:   no    Traumatic History Update:     New Onset of Abuse Since Last Encounter:   no  Traumatic Events Since Last Encounter:   no    Past Medical History:    Past Medical History:   Diagnosis Date   • Depression    • Psychiatric disorder    • [facility] ([facility]         History reviewed  No pertinent surgical history    Allergies   Allergen Reactions   • Zolpidem Hallucinations and Other (See Comments)     Other reaction(s): Hallucinations     Substance Abuse History:    Social History     Substance and Sexual Activity   Alcohol Use No     Social History     Substance and Sexual Activity   Drug Use No     Social History:    Social History     Socioeconomic History   • Marital status: Significant Other     Spouse name: Not on file   • Number of children: Not on file   • Years of education: Not on file   • Highest education level: Not on file   Occupational History   • Not on file   Tobacco Use   • Smoking status: Never   • Smokeless tobacco: Never   Vaping Use   • Vaping Use: Never used   Substance and Sexual Activity   • Alcohol use: No   • Drug use: No   • Sexual activity: Yes     Partners: Female   Other Topics Concern   • Not on file   Social History Narrative   • Not on file     Social Determinants of Health     Financial Resource Strain: Not on file   Food Insecurity: Not on file   Transportation Needs: Not on file   Physical Activity: Not on file   Stress: Not on file   Social Connections: Not on file   Intimate Partner Violence: Not on file Housing Stability: Not on file     Family Psychiatric History:     Family History   Problem Relation Age of Onset   • Leukemia Mother      History Review: The following portions of the patient's history were reviewed and updated as appropriate: allergies, current medications, past family history, past medical history, past social history, past surgical history and problem list     OBJECTIVE:     Vital signs in last 24 hours: There were no vitals filed for this visit  Laboratory Results:   Recent Labs (last 2 months):   No visits with results within 2 Month(s) from this visit  Latest known visit with results is:   Annual Exam on 09/21/2021   Component Date Value   • Case Report 09/21/2021                      Value:Gynecologic Cytology Report                       Case: [id number]                                  Authorizing Provider:  Gayle Valladares PA-C   Collected:           09/21/2021 1553              Ordering Location:     [facility]  Received:            09/21/2021 1553                                     OBGYN                                                                        First Screen:          DELMY Carias                                                       Specimen:    LIQUID-BASED PAP, SCREENING, Cervix                                                       • Primary Interpretation 09/21/2021 Negative for intraepithelial lesion or malignancy    • Interpretation 09/21/2021 Shift in chris suggestive of bacterial vaginosis    • Specimen Adequacy 09/21/2021 Satisfactory for evaluation  Endocervical/transformation zone component present  • Additional Information 09/21/2021                      Value: This result contains rich text formatting which cannot be displayed here  • LMP 09/21/2021 8/16/2021      I have personally reviewed all pertinent laboratory/tests results      Suicide/Homicide Risk Assessment:    Risk of Harm to Self:  Protective Factors: no current suicidal ideation, access to mental health treatment, compliant with medications, compliant with mental health treatment, connection to community, effective decision-making skills, good health, good self-esteem, having a desire to be alive, having a desire to live, having a sense of purpose or meaning in life  Based on today's assessment, Sherry Rodríguez presents the following risk of harm to self: minimal    Risk of Harm to Others:  Based on today's assessment, Sherry Rodríguez presents the following risk of harm to others: minimal    The following interventions are recommended: referral for psychotherapy    Medications Risks/Benefits:      Risks, Benefits And Possible Side Effects Of Medications:    Discussed risks and benefits of treatment with patient including risk of suicidality, serotonin syndrome, increased QTc interval and SIADH related to treatment with antidepressants; Risk of induction of manic symptoms in certain patient populations and risk of parkinsonian symptoms, metabolic syndrome, tardive dyskinesia and neuroleptic malignant syndrome related to treatment with antipsychotic medications     Controlled Medication Discussion:     Not applicable    Treatment Plan:    Due for update/Updated:   no  Last treatment plan done 10/11/22 by amita ojeda  Treatment Plan due on 4/11/23      SERVANDO Bravo 12/06/22  Visit Time    Visit Start Time: 10  Visit Stop Time: 3715    Total Visit Duration: 20 minutes

## 2023-03-07 ENCOUNTER — OFFICE VISIT (OUTPATIENT)
Dept: PSYCHIATRY | Facility: CLINIC | Age: 23
End: 2023-03-07

## 2023-03-07 DIAGNOSIS — G47.00 INSOMNIA, UNSPECIFIED TYPE: ICD-10-CM

## 2023-03-07 DIAGNOSIS — F41.0 GENERALIZED ANXIETY DISORDER WITH PANIC ATTACKS: ICD-10-CM

## 2023-03-07 DIAGNOSIS — R41.840 POOR CONCENTRATION: ICD-10-CM

## 2023-03-07 DIAGNOSIS — F41.1 GENERALIZED ANXIETY DISORDER WITH PANIC ATTACKS: ICD-10-CM

## 2023-03-07 DIAGNOSIS — F31.32 BIPOLAR 1 DISORDER, DEPRESSED, MODERATE (HCC): Primary | ICD-10-CM

## 2023-03-07 RX ORDER — ATOMOXETINE 60 MG/1
60 CAPSULE ORAL DAILY
Qty: 90 CAPSULE | Refills: 0 | Status: SHIPPED | OUTPATIENT
Start: 2023-03-07

## 2023-03-07 RX ORDER — VENLAFAXINE HYDROCHLORIDE 150 MG/1
150 CAPSULE, EXTENDED RELEASE ORAL DAILY
Qty: 90 CAPSULE | Refills: 0 | Status: SHIPPED | OUTPATIENT
Start: 2023-03-07

## 2023-03-07 RX ORDER — RAMELTEON 8 MG/1
8 TABLET ORAL
Qty: 30 TABLET | Refills: 1 | Status: SHIPPED | OUTPATIENT
Start: 2023-03-07

## 2023-03-07 RX ORDER — LURASIDONE HYDROCHLORIDE 40 MG/1
40 TABLET, FILM COATED ORAL
Qty: 90 TABLET | Refills: 0 | Status: SHIPPED | OUTPATIENT
Start: 2023-03-07

## 2023-03-07 NOTE — PSYCH
Regular Visit    Problem List Items Addressed This Visit    None  Visit Diagnoses     Bipolar 1 disorder, depressed, moderate (HCC)    -  Primary    Relevant Medications    atoMOXetine (STRATTERA) 60 mg capsule    ramelteon (ROZEREM) 8 mg tablet    lurasidone (Latuda) 40 mg tablet    venlafaxine (EFFEXOR-XR) 150 mg 24 hr capsule    Generalized anxiety disorder with panic attacks        Relevant Medications    atoMOXetine (STRATTERA) 60 mg capsule    ramelteon (ROZEREM) 8 mg tablet    lurasidone (Latuda) 40 mg tablet    venlafaxine (EFFEXOR-XR) 150 mg 24 hr capsule    Poor concentration        Relevant Medications    atoMOXetine (STRATTERA) 60 mg capsule    Insomnia, unspecified type        Relevant Medications    ramelteon (ROZEREM) 8 mg tablet             Encounter provider SERVANDO Garcia    Provider located at    [address] [place] [address]  [address] [facility] [address] [phone]    Recent Visits  No visits were found meeting these conditions  Showing recent visits within past 7 days and meeting all other requirements  Future Appointments  No visits were found meeting these conditions    Showing future appointments within next 150 days and meeting all other requirements       HPI     Current Outpatient Medications   Medication Sig Dispense Refill   • atoMOXetine (STRATTERA) 60 mg capsule Take 1 capsule (60 mg total) by mouth daily 90 capsule 0   • lurasidone (Latuda) 40 mg tablet Take 1 tablet (40 mg total) by mouth daily with dinner 90 tablet 0   • ramelteon (ROZEREM) 8 mg tablet Take 1 tablet (8 mg total) by mouth daily at bedtime 30 tablet 1   • venlafaxine (EFFEXOR-XR) 150 mg 24 hr capsule Take 1 capsule (150 mg total) by mouth daily 90 capsule 0   • albuterol (PROVENTIL HFA,VENTOLIN HFA) 90 mcg/act inhaler 2 puff(s)     • diphenhydrAMINE (BENADRYL) 25 mg capsule Take 25 mg by mouth daily as needed     • hydrOXYzine HCL (ATARAX) 50 mg tablet TAKE 1 TABLET BY MOUTH EVERY 6 HOURS AS NEEDED FOR ANXIETY 90 tablet 0   • Low-Ogestrel 0 3-30 MG-MCG per tablet TAKE 1 TABLET BY MOUTH EVERY DAY (Patient not taking: Reported on 9/27/2022) 84 tablet 3     No current facility-administered medications for this visit  Review of Systems        MEDICATION MANAGEMENT NOTE        [facility]    Name and Date of Birth:  Cindy Peck 23 y o  2000 MRN: [id number]    Date of Visit: March 7, 2023    Allergies   Allergen Reactions   • Zolpidem Hallucinations and Other (See Comments)     Other reaction(s): Hallucinations     SUBJECTIVE:    Lane Garces is seen today for a follow up for Bipolar Disorder, Generalized Anxiety Disorder and insomnia  She continues to do fairly well since the last visit  Reports an increased difficulty falling asleep and maintaining sleep throughout the night, sleeping about 5 hours nightly  History of being on Ambien which caused hallucinations, history of trazodone which was ineffective per patient  Agreeable to initiate Rozerem 8 mg  at bedtime for insomnia  She requested a slight increase in her Strattera, medication increase to 60 mg daily, patient needs to follow up with official ADHD testing if we would ever need to consider alternative ADHD medications and she verbalized understanding  Reports doing well overall, denies feeling depressed at this time  Anxiety comes and goes, work appears to be main cause of her anxiety  Utilizes as needed Atarax for breakthrough anxiety and Effexor 150 mg daily for anxiety symptoms  No further medication changes time  Follow up with official ADHD testing  Remains on psychotherapy wait list looks for the beginning  Denies SI  She denies any side effects from medications  PLAN:    -Continue Latuda to 40mg   Denies all side effects, takes with food  -Complete pending antipsychotic blood work before next visit  -Continue Effexor 150mg daily, denies all side effects  -Titrate Strattera to 60 mg for poor concentration and attention symptoms, pursuing official ADHD testing   -Initiate Rozerem 8mg at hs  for difficulty initiating maintain sleep      Aware of 24 hour and weekend coverage for urgent situations accessed by calling [facility] main practice number  Referral for individual psychotherapy    Diagnoses and all orders for this visit:    Bipolar 1 disorder, depressed, moderate (HCC)  -     lurasidone (Latuda) 40 mg tablet; Take 1 tablet (40 mg total) by mouth daily with dinner  -     venlafaxine (EFFEXOR-XR) 150 mg 24 hr capsule; Take 1 capsule (150 mg total) by mouth daily    Generalized anxiety disorder with panic attacks    Poor concentration  -     atoMOXetine (STRATTERA) 60 mg capsule; Take 1 capsule (60 mg total) by mouth daily    Insomnia, unspecified type  -     ramelteon (ROZEREM) 8 mg tablet; Take 1 tablet (8 mg total) by mouth daily at bedtime        Current Outpatient Medications on File Prior to Visit   Medication Sig Dispense Refill   • albuterol (PROVENTIL HFA,VENTOLIN HFA) 90 mcg/act inhaler 2 puff(s)     • diphenhydrAMINE (BENADRYL) 25 mg capsule Take 25 mg by mouth daily as needed     • hydrOXYzine HCL (ATARAX) 50 mg tablet TAKE 1 TABLET BY MOUTH EVERY 6 HOURS AS NEEDED FOR ANXIETY 90 tablet 0   • Low-Ogestrel 0 3-30 MG-MCG per tablet TAKE 1 TABLET BY MOUTH EVERY DAY (Patient not taking: Reported on 9/27/2022) 84 tablet 3   • [DISCONTINUED] atoMOXetine (STRATTERA) 40 mg capsule TAKE 1 CAPSULE BY MOUTH DAILY  30 capsule 0   • [DISCONTINUED] lurasidone (Latuda) 40 mg tablet Take 1 tablet (40 mg total) by mouth daily with dinner 90 tablet 0   • [DISCONTINUED] venlafaxine (EFFEXOR-XR) 150 mg 24 hr capsule TAKE 1 CAPSULE BY MOUTH EVERY DAY 30 capsule 0     No current facility-administered medications on file prior to visit           HPI ROS Appetite Changes and Sleep:     She reports difficulty falling asleep, frequent awakenings, adequate appetite, low energy   Denies homicidal ideation, denies suicidal ideation    Review Of Systems:      HPI ROS:               Medication Side Effects:  denies    Depression (10 worst): 2/10    Anxiety (10 worst): 5/10    Safety concerns (SI, HI, etc): Denies si and hi    Sleep: 4-6 hrs    Energy: Fair to low    Appetite: 2-3 meals    Weight Change: denies        Mental Status Evaluation:    Appearance Adequate hygiene and grooming   Behavior calm and cooperative   Mood anxious and depressed  Depression Scale - 2 of 10 (0 = No depression)  Anxiety Scale - 5 of 10 (0 = No anxiety)   Speech Normal rate and volume   Affect mood-congruent   Thought Processes Goal directed and coherent   Thought Content Does not verbalize delusional material   Associations Tightly connected   Perceptual Disturbances Denies hallucinations and does not appear to be responding to internal stimuli   Risk Potential Suicidal/Homicidal Ideation - No evidence of suicidal or homicidal ideation and patient does not verbalize suicidal or homicidal ideation  Risk of Violence - No evidence of risk for violence found on assessment  Risk of Self Mutilation - No evidence of risk for self mutilation found on assessment   Orientation oriented to person, place, time/date and situation   Memory recent and remote memory grossly intact   Consciousness alert and awake   Attention/Concentration attention span and concentration appear shorter than expected for age   Insight intact   Judgement intact   Muscle Strength and Gait normal muscle strength and normal muscle tone, normal gait/station and normal balance   Motor Activity no abnormal movements   Language no difficulty naming common objects, no difficulty repeating a phrase, no difficulty writing a sentence   Fund of Knowledge adequate knowledge of current events  adequate fund of knowledge regarding past history  adequate fund of knowledge regarding vocabulary      Traumatic History Update:     New Onset of Abuse Since Last Encounter:   no  Traumatic Events Since Last Encounter:   no    Past Medical History:    Past Medical History:   Diagnosis Date   • Depression    • Psychiatric disorder    • Schizophrenia ([facility] [address] )         No past surgical history on file  Allergies   Allergen Reactions   • Zolpidem Hallucinations and Other (See Comments)     Other reaction(s): Hallucinations     Substance Abuse History:    Social History     Substance and Sexual Activity   Alcohol Use No     Social History     Substance and Sexual Activity   Drug Use No     Social History:    Social History     Socioeconomic History   • Marital status: Significant Other     Spouse name: Not on file   • Number of children: Not on file   • Years of education: Not on file   • Highest education level: Not on file   Occupational History   • Not on file   Tobacco Use   • Smoking status: Never   • Smokeless tobacco: Never   Vaping Use   • Vaping Use: Never used   Substance and Sexual Activity   • Alcohol use: No   • Drug use: No   • Sexual activity: Yes     Partners: Female   Other Topics Concern   • Not on file   Social History Narrative   • Not on file     Social Determinants of Health     Financial Resource Strain: Not on file   Food Insecurity: Not on file   Transportation Needs: Not on file   Physical Activity: Not on file   Stress: Not on file   Social Connections: Not on file   Intimate Partner Violence: Not on file   Housing Stability: Not on file     Family Psychiatric History:     Family History   Problem Relation Age of Onset   • Leukemia Mother      History Review: The following portions of the patient's history were reviewed and updated as appropriate: allergies, current medications, past family history, past medical history, past social history, past surgical history and problem list     OBJECTIVE:     Vital signs in last 24 hours: There were no vitals filed for this visit    Laboratory Results:   Recent Labs (last 2 months):   No visits with results within 2 Month(s) from this visit  Latest known visit with results is:   Annual Exam on 09/21/2021   Component Date Value   • Case Report 09/21/2021                      Value:Gynecologic Cytology Report                       Case: [id number]                                  Authorizing Provider:  Malinda Figueroa PA-C   Collected:           09/21/2021 1553              Ordering Location:     [facility]  Received:            09/21/2021 1553                                     OBGYN                                                                        First Screen:          Oletta Sever[place], [place]                                                       Specimen:    LIQUID-BASED PAP, SCREENING, Cervix                                                       • Primary Interpretation 09/21/2021 Negative for intraepithelial lesion or malignancy    • Interpretation 09/21/2021 Shift in chris suggestive of bacterial vaginosis    • Specimen Adequacy 09/21/2021 Satisfactory for evaluation  Endocervical/transformation zone component present  • Additional Information 09/21/2021                      Value: This result contains rich text formatting which cannot be displayed here  • LMP 09/21/2021 8/16/2021      I have personally reviewed all pertinent laboratory/tests results      Suicide/Homicide Risk Assessment:    Risk of Harm to Self:  Protective Factors: no current suicidal ideation, access to mental health treatment, compliant with medications, compliant with mental health treatment, good self-esteem, having a desire to be alive, having a desire to live, having a sense of purpose or meaning in life  Based on today's assessment, Rogers Bejaranonarda presents the following risk of harm to self: minimal    Risk of Harm to Others:  Based on today's assessment, Rogers Castillo presents the following risk of harm to others: minimal    The following interventions are recommended: referral for psychotherapy    Medications Risks/Benefits:      Risks, Benefits And Possible Side Effects Of Medications:    Discussed risks and benefits of treatment with patient including risk of suicidality, serotonin syndrome, increased QTc interval and SIADH related to treatment with antidepressants; Risk of induction of manic symptoms in certain patient populations and risk of parkinsonian symptoms, metabolic syndrome, tardive dyskinesia and neuroleptic malignant syndrome related to treatment with antipsychotic medications     Controlled Medication Discussion:     Not applicable    Treatment Plan:    Due for update/Updated:   no  Last treatment plan done 10/11/22 by amita ojeda  Treatment Plan due on 4/11/23      SERVANDO Ramos 03/07/23    Visit Time    Visit Start Time: 6711  Visit Stop Time: 41848  Total Visit Duration: 17 minutes

## 2023-04-14 ENCOUNTER — PATIENT MESSAGE (OUTPATIENT)
Dept: PSYCHIATRY | Facility: CLINIC | Age: 23
End: 2023-04-14

## 2023-06-22 DIAGNOSIS — R41.840 POOR CONCENTRATION: ICD-10-CM

## 2023-06-26 RX ORDER — ATOMOXETINE 60 MG/1
CAPSULE ORAL
Qty: 30 CAPSULE | Refills: 2 | Status: SHIPPED | OUTPATIENT
Start: 2023-06-26

## 2023-09-11 ENCOUNTER — OFFICE VISIT (OUTPATIENT)
Dept: URGENT CARE | Facility: CLINIC | Age: 23
End: 2023-09-11
Payer: COMMERCIAL

## 2023-09-11 VITALS
OXYGEN SATURATION: 99 % | SYSTOLIC BLOOD PRESSURE: 139 MMHG | DIASTOLIC BLOOD PRESSURE: 92 MMHG | RESPIRATION RATE: 16 BRPM | HEART RATE: 101 BPM | TEMPERATURE: 97.6 F

## 2023-09-11 DIAGNOSIS — R35.0 URINARY FREQUENCY: ICD-10-CM

## 2023-09-11 DIAGNOSIS — R11.2 NAUSEA AND VOMITING, UNSPECIFIED VOMITING TYPE: Primary | ICD-10-CM

## 2023-09-11 DIAGNOSIS — R10.30 LOWER ABDOMINAL PAIN: ICD-10-CM

## 2023-09-11 LAB
SL AMB  POCT GLUCOSE, UA: NEGATIVE
SL AMB LEUKOCYTE ESTERASE,UA: NEGATIVE
SL AMB POCT BILIRUBIN,UA: NEGATIVE
SL AMB POCT BLOOD,UA: NEGATIVE
SL AMB POCT CLARITY,UA: NORMAL
SL AMB POCT COLOR,UA: YELLOW
SL AMB POCT KETONES,UA: NEGATIVE
SL AMB POCT NITRITE,UA: NEGATIVE
SL AMB POCT PH,UA: 5
SL AMB POCT SPECIFIC GRAVITY,UA: 1.03
SL AMB POCT URINE HCG: NEGATIVE
SL AMB POCT URINE PROTEIN: NEGATIVE
SL AMB POCT UROBILINOGEN: 0.2

## 2023-09-11 PROCEDURE — 81025 URINE PREGNANCY TEST: CPT

## 2023-09-11 PROCEDURE — 81002 URINALYSIS NONAUTO W/O SCOPE: CPT

## 2023-09-11 PROCEDURE — 99213 OFFICE O/P EST LOW 20 MIN: CPT

## 2023-09-11 RX ORDER — ONDANSETRON 4 MG/1
4 TABLET, FILM COATED ORAL EVERY 8 HOURS PRN
Qty: 20 TABLET | Refills: 0 | Status: SHIPPED | OUTPATIENT
Start: 2023-09-11

## 2023-09-11 NOTE — LETTER
September 11, 2023     Patient: Arun Sepulveda   YOB: 2000   Date of Visit: 9/11/2023       To Whom it May Concern:    Domingo Thomson was seen in my clinic on 9/11/2023. She may return to work on 9/12/2023 . If you have any questions or concerns, please don't hesitate to call.          Sincerely,          SERVANDO Fernando        CC: No Recipients

## 2023-09-11 NOTE — PROGRESS NOTES
[facility]        NAME: Rena Issa is a 23 y.o. female  : 2000    MRN: [id number]  DATE: 2023  TIME: 2:10 PM    Assessment and Plan   Nausea and vomiting, unspecified vomiting type [R11.2]  1. Nausea and vomiting, unspecified vomiting type  POCT urine HCG    ondansetron (ZOFRAN) 4 mg tablet      2. Lower abdominal pain  POCT urine HCG      3. Urinary frequency  POCT urine dip        Urine dip and HCG negative. Work note given. Patient Instructions     Follow up with gyn. Follow up with GI. Zofran as needed for nausea/vomiting. Follow up with PCP in 3-5 days. Proceed to the ER with worsening symptoms. Chief Complaint     Chief Complaint   Patient presents with   • Abdominal Pain     States greater than a month. Indicates when laying flat abdomen feels tight. Indicates pain/ cramps wake her from her sleep. And she vomits at times. Denies change to diet. Verbalizes no change to bm typically 2 per day. History of Present Illness       The patient presents today with complaints of intermittent lower abdominal pain, nausea/vomiting especially in the morning that has been going on for about 1 month. She also complains of diarrhea or soft stools. Denies fever/chills, or history of GI issues in the past. Denies chance of pregnancy. LMP was on 23 lasting 6 days. She reports symptoms seem to increase around menses. Reports history of PCOS. Her last yearly gyn exam was about 1 year ago and states she is due to schedule an appointment. Denies changes in diet or medications. She is requesting a work note. Review of Systems   Review of Systems   Constitutional: Negative for chills and fever. Gastrointestinal: Positive for abdominal distention, abdominal pain (lower abdomen), diarrhea, nausea and vomiting. Negative for constipation. Skin: Negative for rash.          Current Medications       Current Outpatient Medications:   •  albuterol (PROVENTIL HFA,VENTOLIN HFA) 90 mcg/act inhaler, 2 puff(s), Disp: , Rfl:   •  diphenhydrAMINE (BENADRYL) 25 mg capsule, Take 25 mg by mouth daily as needed, Disp: , Rfl:   •  lurasidone (Latuda) 40 mg tablet, Take 1 tablet (40 mg total) by mouth daily with dinner, Disp: 90 tablet, Rfl: 0  •  ondansetron (ZOFRAN) 4 mg tablet, Take 1 tablet (4 mg total) by mouth every 8 (eight) hours as needed for nausea or vomiting, Disp: 20 tablet, Rfl: 0  •  venlafaxine (EFFEXOR-XR) 150 mg 24 hr capsule, Take 1 capsule (150 mg total) by mouth daily, Disp: 90 capsule, Rfl: 0  •  atoMOXetine (STRATTERA) 60 mg capsule, TAKE 1 CAPSULE BY MOUTH EVERY DAY (Patient not taking: Reported on 9/11/2023), Disp: 30 capsule, Rfl: 2  •  ramelteon (ROZEREM) 8 mg tablet, Take 1 tablet (8 mg total) by mouth daily at bedtime, Disp: 30 tablet, Rfl: 1    Current Allergies     Allergies as of 09/11/2023 - Reviewed 09/11/2023   Allergen Reaction Noted   • Zolpidem Hallucinations and Other (See Comments)             The following portions of the patient's history were reviewed and updated as appropriate: allergies, current medications, past family history, past medical history, past social history, past surgical history and problem list.     Past Medical History:   Diagnosis Date   • Depression    • Psychiatric disorder    • Schizophrenia ([address])        History reviewed. No pertinent surgical history. Family History   Problem Relation Age of Onset   • Leukemia Mother          Medications have been verified. Objective   /92   Pulse 101   Temp 97.6 °F (36.4 °C)   Resp 16   SpO2 99%        Physical Exam     Physical Exam  Vitals and nursing note reviewed. Constitutional:       General: She is not in acute distress. Appearance: Normal appearance. She is not ill-appearing. HENT:      Head: Normocephalic and atraumatic.       Right Ear: External ear normal.      Left Ear: External ear normal.      Nose: Nose normal.      Mouth/Throat:      Lips: Pink.      Mouth: Mucous membranes are moist.   Eyes:      General: Vision grossly intact. Extraocular Movements: Extraocular movements intact. Pupils: Pupils are equal, round, and reactive to light. Cardiovascular:      Rate and Rhythm: Regular rhythm. Tachycardia present. Heart sounds: Normal heart sounds. No murmur heard. Pulmonary:      Effort: Pulmonary effort is normal. No respiratory distress. Breath sounds: Normal breath sounds. No decreased air movement. No decreased breath sounds, wheezing, rhonchi or rales. Abdominal:      Palpations: Abdomen is soft. Tenderness: There is abdominal tenderness (mild) in the right lower quadrant and left lower quadrant. There is no guarding or rebound. Musculoskeletal:         General: Normal range of motion. Cervical back: Normal range of motion. Skin:     General: Skin is warm. Findings: No rash. Neurological:      Mental Status: She is alert and oriented to person, place, and time. Motor: Motor function is intact. Gait: Gait is intact.    Psychiatric:         Attention and Perception: Attention normal.         Mood and Affect: Mood normal.

## 2023-09-11 NOTE — PATIENT INSTRUCTIONS
Follow up with gyn. Follow up with GI. Zofran as needed for nausea/vomiting. Follow up with PCP in 3-5 days. Proceed to the ER with worsening symptoms.

## 2023-09-17 ENCOUNTER — TELEPHONE (OUTPATIENT)
Dept: OTHER | Facility: OTHER | Age: 23
End: 2023-09-17

## 2023-09-17 DIAGNOSIS — F31.32 BIPOLAR 1 DISORDER, DEPRESSED, MODERATE (HCC): ICD-10-CM

## 2023-09-18 RX ORDER — LURASIDONE HYDROCHLORIDE 40 MG/1
40 TABLET, FILM COATED ORAL
Qty: 30 TABLET | Refills: 2 | Status: SHIPPED | OUTPATIENT
Start: 2023-09-18

## 2023-10-10 ENCOUNTER — TELEPHONE (OUTPATIENT)
Dept: PSYCHIATRY | Facility: CLINIC | Age: 23
End: 2023-10-10

## 2023-10-10 NOTE — TELEPHONE ENCOUNTER
Patient called and lvm to schedule a f/u appt with provider, writer called and lvm for patient to call [facility] office to schedule f/u appt
5

## 2023-10-25 DIAGNOSIS — R41.840 POOR CONCENTRATION: ICD-10-CM

## 2023-10-25 RX ORDER — ATOMOXETINE 60 MG/1
60 CAPSULE ORAL DAILY
Qty: 30 CAPSULE | Refills: 2 | Status: SHIPPED | OUTPATIENT
Start: 2023-10-25

## 2023-10-27 ENCOUNTER — TELEPHONE (OUTPATIENT)
Dept: PSYCHIATRY | Facility: CLINIC | Age: 23
End: 2023-10-27

## 2023-10-27 DIAGNOSIS — F31.32 BIPOLAR 1 DISORDER, DEPRESSED, MODERATE (HCC): ICD-10-CM

## 2023-10-27 RX ORDER — VENLAFAXINE HYDROCHLORIDE 150 MG/1
150 CAPSULE, EXTENDED RELEASE ORAL DAILY
Qty: 90 CAPSULE | Refills: 0 | Status: SHIPPED | OUTPATIENT
Start: 2023-10-27

## 2023-11-07 ENCOUNTER — OFFICE VISIT (OUTPATIENT)
Dept: PSYCHIATRY | Facility: CLINIC | Age: 23
End: 2023-11-07
Payer: COMMERCIAL

## 2023-11-07 DIAGNOSIS — R41.840 POOR CONCENTRATION: ICD-10-CM

## 2023-11-07 DIAGNOSIS — F31.32 BIPOLAR 1 DISORDER, DEPRESSED, MODERATE (HCC): Primary | ICD-10-CM

## 2023-11-07 DIAGNOSIS — F41.1 GENERALIZED ANXIETY DISORDER: ICD-10-CM

## 2023-11-07 PROCEDURE — 99214 OFFICE O/P EST MOD 30 MIN: CPT

## 2023-11-07 RX ORDER — ATOMOXETINE 60 MG/1
60 CAPSULE ORAL DAILY
Qty: 90 CAPSULE | Refills: 1 | Status: SHIPPED | OUTPATIENT
Start: 2023-11-07

## 2023-11-07 RX ORDER — VENLAFAXINE HYDROCHLORIDE 150 MG/1
150 CAPSULE, EXTENDED RELEASE ORAL DAILY
Qty: 90 CAPSULE | Refills: 1 | Status: SHIPPED | OUTPATIENT
Start: 2023-11-07

## 2023-11-07 RX ORDER — LURASIDONE HYDROCHLORIDE 40 MG/1
40 TABLET, FILM COATED ORAL
Qty: 90 TABLET | Refills: 1 | Status: SHIPPED | OUTPATIENT
Start: 2023-11-07

## 2023-11-07 NOTE — PSYCH
Regular Visit    Problem List Items Addressed This Visit    None  Visit Diagnoses       Bipolar 1 disorder, depressed, moderate ([address])    -  Primary    Relevant Medications    atoMOXetine (STRATTERA) 60 mg capsule    venlafaxine (EFFEXOR-XR) 150 mg 24 hr capsule    lurasidone (Latuda) 40 mg tablet    Generalized anxiety disorder        Relevant Medications    atoMOXetine (STRATTERA) 60 mg capsule    venlafaxine (EFFEXOR-XR) 150 mg 24 hr capsule    lurasidone (Latuda) 40 mg tablet    Poor concentration        Relevant Medications    atoMOXetine (STRATTERA) 60 mg capsule             Encounter provider SERVANDO Cramer    Provider located at    [id number] [facility] [id number] [phone]    Recent Visits  Date Type Provider Dept   11/07/23 Office Visit SERVANDO Cramer  [facility]   Showing recent visits within past 7 days and meeting all other requirements  Future Appointments  No visits were found meeting these conditions. Showing future appointments within next 150 days and meeting all other requirements       HPI     Current Outpatient Medications   Medication Sig Dispense Refill    atoMOXetine (STRATTERA) 60 mg capsule Take 1 capsule (60 mg total) by mouth daily 90 capsule 1    lurasidone (Latuda) 40 mg tablet Take 1 tablet (40 mg total) by mouth daily with dinner 90 tablet 1    venlafaxine (EFFEXOR-XR) 150 mg 24 hr capsule Take 1 capsule (150 mg total) by mouth daily 90 capsule 1    albuterol (PROVENTIL HFA,VENTOLIN HFA) 90 mcg/act inhaler 2 puff(s)      ondansetron (ZOFRAN) 4 mg tablet Take 1 tablet (4 mg total) by mouth every 8 (eight) hours as needed for nausea or vomiting 20 tablet 0     No current facility-administered medications for this visit.        Review of Systems        MEDICATION MANAGEMENT NOTE        [facility]    Name and Date of Birth:  Rodger Garg 40 y.o. 2000 MRN: [id number]    Date of Visit: November 8, 2023    Allergies   Allergen Reactions    Zolpidem Hallucinations and Other (See Comments)     Other reaction(s): Hallucinations     SUBJECTIVE:    Tone Camilo is seen today for a follow up for Bipolar Disorder, Generalized Anxiety Disorder and insomnia. She continues to do fairly well since the last visit. Reports having difficulty with pharmacy in terms of picking up Effexor  mg daily, went without it for 3 days and experienced shakiness, nausea. Encouraged patient to call this office anytime she is having difficulty with pharmacy so we can ensure patient does not go through withdrawal symptoms and she verbalized understanding. States when she receives medication and takes on a daily basis, bipolar depressive symptoms are stabilized with no debilitating or concerning symptoms. Reports chronic 3/10 depression with occasional dysphoria, low energy motivation. Denies all symptoms of orville and no evidence of grandiosity, impulsivity, insomnia, hypertalkativeness. Mood appears controlled and appropriate during today's encounter. States anxiety continues to wax and wane depending on external stressors, more anxiety in social settings, does not avoid them. Works in cosmetology. Denies panic level anxiety, interested in psychotherapy list for first line anxiety treatment, remains on wait list.  Denies SI. Complete pending labs. She denies any side effects from medications. PLAN:    -Continue Latuda to 40mg.  Denies all side effects, takes with food. -Complete pending antipsychotic blood work before next visit  -Continue Effexor 150mg daily, denies all side effects   PARQ completed including serotonin syndrome, SIADH, worsening depression, suicidality, induction of orville, GI upset, headaches, activation, sexual side effects, sedation, potential drug interactions, and others.   -Continue Strattera to 60 mg for poor concentration and attention symptoms. Aware of 24 hour and weekend coverage for urgent situations accessed by calling [facility] main practice number  Referral for individual psychotherapy    Diagnoses and all orders for this visit:    Bipolar 1 disorder, depressed, moderate (HCC)  -     venlafaxine (EFFEXOR-XR) 150 mg 24 hr capsule; Take 1 capsule (150 mg total) by mouth daily  -     lurasidone (Latuda) 40 mg tablet; Take 1 tablet (40 mg total) by mouth daily with dinner    Generalized anxiety disorder    Poor concentration  -     atoMOXetine (STRATTERA) 60 mg capsule; Take 1 capsule (60 mg total) by mouth daily          Current Outpatient Medications on File Prior to Visit   Medication Sig Dispense Refill    albuterol (PROVENTIL HFA,VENTOLIN HFA) 90 mcg/act inhaler 2 puff(s)      ondansetron (ZOFRAN) 4 mg tablet Take 1 tablet (4 mg total) by mouth every 8 (eight) hours as needed for nausea or vomiting 20 tablet 0     No current facility-administered medications on file prior to visit. HPI ROS Appetite Changes and Sleep:     She reports difficulty falling asleep, frequent awakenings, adequate appetite, low energy.  Denies homicidal ideation, denies suicidal ideation    Review Of Systems:      HPI ROS:               Medication Side Effects:  denies    Depression (10 worst): 2/10    Anxiety (10 worst): 2/10    Safety concerns (SI, HI, etc): Denies si and hi    Sleep: 6 hrs    Energy: Fair to low    Appetite: 2-3 meals    Weight Change: denies        Mental Status Evaluation:    Appearance Adequate hygiene and grooming   Behavior calm and cooperative   Mood anxious and depressed  Depression Scale - 2 of 10 (0 = No depression)  Anxiety Scale - 2 of 10 (0 = No anxiety)   Speech Normal rate and volume   Affect mood-congruent   Thought Processes Goal directed and coherent   Thought Content Does not verbalize delusional material   Associations Tightly connected   Perceptual Disturbances Denies hallucinations and does not appear to be responding to internal stimuli   Risk Potential Suicidal/Homicidal Ideation - No evidence of suicidal or homicidal ideation and patient does not verbalize suicidal or homicidal ideation  Risk of Violence - No evidence of risk for violence found on assessment  Risk of Self Mutilation - No evidence of risk for self mutilation found on assessment   Orientation oriented to person, place, time/date and situation   Memory recent and remote memory grossly intact   Consciousness alert and awake   Attention/Concentration attention span and concentration appear shorter than expected for age   Insight intact   Judgement intact   Muscle Strength and Gait normal muscle strength and normal muscle tone, normal gait/station and normal balance   Motor Activity no abnormal movements   Language no difficulty naming common objects, no difficulty repeating a phrase, no difficulty writing a sentence   Fund of Knowledge adequate knowledge of current events  adequate fund of knowledge regarding past history  adequate fund of knowledge regarding vocabulary      Traumatic History Update:     New Onset of Abuse Since Last Encounter:   no  Traumatic Events Since Last Encounter:   no    Past Medical History:    Past Medical History:   Diagnosis Date    Depression     Psychiatric disorder     Schizophrenia ([address])         History reviewed. No pertinent surgical history.   Allergies   Allergen Reactions    Zolpidem Hallucinations and Other (See Comments)     Other reaction(s): Hallucinations     Substance Abuse History:    Social History     Substance and Sexual Activity   Alcohol Use No     Social History     Substance and Sexual Activity   Drug Use Yes    Types: Marijuana     Social History:    Social History     Socioeconomic History    Marital status: Significant Other     Spouse name: Not on file    Number of children: Not on file    Years of education: Not on file    Highest education level: Not on file   Occupational History    Not on file   Tobacco Use    Smoking status: Never    Smokeless tobacco: Never   Vaping Use    Vaping Use: Never used   Substance and Sexual Activity    Alcohol use: No    Drug use: Yes     Types: Marijuana    Sexual activity: Yes     Partners: Female   Other Topics Concern    Not on file   Social History Narrative    Not on file     Social Determinants of Health     Financial Resource Strain: Not on file   Food Insecurity: Not on file   Transportation Needs: Not on file   Physical Activity: Not on file   Stress: Not on file   Social Connections: Not on file   Intimate Partner Violence: Not on file   Housing Stability: Not on file     Family Psychiatric History:     Family History   Problem Relation Age of Onset    Leukemia Mother      History Review: The following portions of the patient's history were reviewed and updated as appropriate: allergies, current medications, past family history, past medical history, past social history, past surgical history and problem list     OBJECTIVE:     Vital signs in last 24 hours: There were no vitals filed for this visit. Laboratory Results:   Recent Labs (last 2 months):   Office Visit on 09/11/2023   Component Date Value    LEUKOCYTE ESTERASE,UA 09/11/2023 negative     NITRITE,UA 09/11/2023 negative     SL AMB POCT UROBILINOGEN 09/11/2023 0.2     POCT URINE PROTEIN 09/11/2023 negative      PH,UA 09/11/2023 5.0     BLOOD,UA 09/11/2023 negative     SPECIFIC GRAVITY,UA 09/11/2023 1.030     KETONES,UA 09/11/2023 negative     BILIRUBIN,UA 09/11/2023 negative     GLUCOSE, UA 09/11/2023 negative      COLOR,UA 09/11/2023 yellow     CLARITY,UA 09/11/2023 hazy     URINE HCG 09/11/2023 negative      I have personally reviewed all pertinent laboratory/tests results.     Suicide/Homicide Risk Assessment:    Risk of Harm to Self:  Protective Factors: no current suicidal ideation, access to mental health treatment, compliant with medications, compliant with mental health treatment, good self-esteem, having a desire to be alive, having a desire to live, having a sense of purpose or meaning in life  Based on today's assessment, Pilar Roca presents the following risk of harm to self: minimal    Risk of Harm to Others:  Based on today's assessment, Pilar Roca presents the following risk of harm to others: minimal    The following interventions are recommended: referral for psychotherapy    Medications Risks/Benefits:      Risks, Benefits And Possible Side Effects Of Medications:    Discussed risks and benefits of treatment with patient including risk of suicidality, serotonin syndrome, increased QTc interval and SIADH related to treatment with antidepressants; Risk of induction of manic symptoms in certain patient populations and risk of parkinsonian symptoms, metabolic syndrome, tardive dyskinesia and neuroleptic malignant syndrome related to treatment with antipsychotic medications     Controlled Medication Discussion:     Not applicable    Treatment Plan:    Due for update/Updated:   no  Last treatment plan done 10/11/22 by amita ojeda. Treatment Plan due on 4/11/23.     SERVANDO Walton 11/08/23    Visit Time    Visit Start Time: 230  Visit Stop Time: 245  Total Visit Duration:  15 minutes

## 2024-01-29 NOTE — PROGRESS NOTES
Assessment/Plan   Problem List Items Addressed This Visit       Well woman exam - Primary     Other Visit Diagnoses       Nipple discharge        Relevant Orders    Prolactin            Discussion  I have discussed the importance of monthly self-breast exams, exercise and healthy diet.    Encourage safe sexual practices; STI testing - Declines  Patient desires BC to regulate cycles and help with dysmenorrhea/bleeding. Reviewed current medication list, including Latuda and Strattera, that can affect efficacy of BC (ALLEN/POP) or metabolism of those medications. She previously had the Nexplanon and did not do well with it. Reviewed DMPA and IUDs including mechanism of action, risks, benefits, side effects, and procedure. Patient does not desire DMPA due to risk of weight gain and mood changes. Provided more information regarding IUDs in AVS. She is interested in the Estefania- prior auth sent to clinical team.    The current ASCCP guidelines were reviewed. Patient's last pap was 2021 and therefore, a pap with HPV cotesting is not  indicated at this time.     All questions have been answered to her satisfaction  RTO for APE or sooner if needed    Subjective     HPI   Laurence Kim is a 23 y.o. female who presents for annual well woman exam.     Menarche: 9y/o; LMP - 01/28/24; Periods are irregular, with skipped months, lasting 5 days. Cramping and bleeding is worse on CD2; changing Super+ tampon every 2-3 hours. +PMS symptoms extreme.      No vulvar itch/burn; No vaginal itch/burn; No abn discharge or odor;    No urinary sx - burning/pain/frequency/hematuria    (+) SBEs - no breast masses, asymmetry, or bleeding, changes in skin of breast, or breast tenderness bilaterally. +leaky nipples with active stimulation; occasional spontaneous leakage.     No abd/pelvic pain or HAs;     Pt is sexually active in a mutually monog sexual relationship; female partners.  No issues with intercourse; She declines sti/hiv/hep testing;  Feels safe at home        Review of Systems   Constitutional:  Negative for fatigue.   Eyes:  Negative for photophobia and visual disturbance.   Respiratory:  Negative for cough and shortness of breath.    Cardiovascular:  Negative for chest pain and palpitations.   Gastrointestinal:  Negative for abdominal pain, blood in stool, constipation, diarrhea, nausea and rectal pain.   Genitourinary:  Negative for dyspareunia, dysuria, flank pain, frequency, genital sores, menstrual problem, pelvic pain, urgency, vaginal bleeding, vaginal discharge and vaginal pain.   Musculoskeletal:  Negative for arthralgias and back pain.   Skin:  Negative for rash.   Neurological:  Negative for weakness and headaches.       The following portions of the patient's history were reviewed and updated as appropriate: allergies, current medications, past family history, past medical history, past social history, past surgical history, and problem list.         OB History          1    Para   0    Term   0       0    AB   1    Living   0         SAB   0    IAB   1    Ectopic   0    Multiple   0    Live Births   0                 Past Medical History:   Diagnosis Date    Depression     Psychiatric disorder     Schizophrenia (HCC)        History reviewed. No pertinent surgical history.    Family History   Problem Relation Age of Onset    Leukemia Mother        Social History     Socioeconomic History    Marital status: Significant Other     Spouse name: Not on file    Number of children: Not on file    Years of education: Not on file    Highest education level: Not on file   Occupational History    Not on file   Tobacco Use    Smoking status: Never    Smokeless tobacco: Never   Vaping Use    Vaping status: Never Used   Substance and Sexual Activity    Alcohol use: No    Drug use: Yes     Types: Marijuana    Sexual activity: Yes     Partners: Female   Other Topics Concern    Not on file   Social History Narrative    Not on file  "    Social Determinants of Health     Financial Resource Strain: Not on file   Food Insecurity: Not on file   Transportation Needs: Not on file   Physical Activity: Not on file   Stress: Not on file   Social Connections: Not on file   Intimate Partner Violence: Not on file   Housing Stability: Not on file         Current Outpatient Medications:     albuterol (PROVENTIL HFA,VENTOLIN HFA) 90 mcg/act inhaler, 2 puff(s), Disp: , Rfl:     atoMOXetine (STRATTERA) 60 mg capsule, Take 1 capsule (60 mg total) by mouth daily, Disp: 90 capsule, Rfl: 1    lurasidone (Latuda) 40 mg tablet, Take 1 tablet (40 mg total) by mouth daily with dinner, Disp: 90 tablet, Rfl: 1    ondansetron (ZOFRAN) 4 mg tablet, Take 1 tablet (4 mg total) by mouth every 8 (eight) hours as needed for nausea or vomiting, Disp: 20 tablet, Rfl: 0    venlafaxine (EFFEXOR-XR) 150 mg 24 hr capsule, Take 1 capsule (150 mg total) by mouth daily, Disp: 90 capsule, Rfl: 1    Allergies   Allergen Reactions    Zolpidem Hallucinations and Other (See Comments)     Other reaction(s): Hallucinations       Objective   Vitals:    01/30/24 1345   BP: 136/82   BP Location: Left arm   Patient Position: Sitting   Cuff Size: Large   Weight: 129 kg (285 lb)   Height: 5' 9\" (1.753 m)     Physical Exam  Vitals and nursing note reviewed.   Constitutional:       Appearance: Normal appearance. She is well-developed and normal weight.   HENT:      Head: Normocephalic and atraumatic.   Eyes:      Conjunctiva/sclera: Conjunctivae normal.   Cardiovascular:      Rate and Rhythm: Normal rate and regular rhythm.      Heart sounds: Normal heart sounds.   Pulmonary:      Effort: Pulmonary effort is normal.      Breath sounds: Normal breath sounds.   Chest:   Breasts:     Breasts are symmetrical.      Right: Normal. No inverted nipple, mass, nipple discharge, skin change or tenderness.      Left: Normal. No inverted nipple, mass, nipple discharge, skin change or tenderness.   Abdominal:      " General: Abdomen is flat. There is no distension.      Palpations: Abdomen is soft. There is no mass.      Tenderness: There is no abdominal tenderness. There is no right CVA tenderness or left CVA tenderness.   Genitourinary:     General: Normal vulva.      Exam position: Lithotomy position.      Pubic Area: No rash or pubic lice.       Labia:         Right: No rash or tenderness.         Left: No rash or tenderness.       Urethra: No urethral pain.      Vagina: Normal. No vaginal discharge.      Cervix: Normal.      Uterus: Normal. No uterine prolapse.       Adnexa: Right adnexa normal and left adnexa normal.        Right: No mass or tenderness.          Left: No mass or tenderness.     Musculoskeletal:         General: No tenderness. Normal range of motion.      Cervical back: Normal range of motion. No tenderness.      Right lower leg: No edema.      Left lower leg: No edema.   Lymphadenopathy:      Cervical: No cervical adenopathy.      Upper Body:      Right upper body: No supraclavicular or axillary adenopathy.      Left upper body: No supraclavicular or axillary adenopathy.      Lower Body: No right inguinal adenopathy. No left inguinal adenopathy.   Skin:     General: Skin is warm and dry.   Neurological:      Mental Status: She is alert and oriented to person, place, and time.      Motor: No weakness.   Psychiatric:         Mood and Affect: Mood normal.         Behavior: Behavior normal.         Thought Content: Thought content normal.         Judgment: Judgment normal.         Patient Instructions   Estefania IUD: 3 years

## 2024-01-30 ENCOUNTER — ANNUAL EXAM (OUTPATIENT)
Dept: OBGYN CLINIC | Facility: CLINIC | Age: 24
End: 2024-01-30
Payer: COMMERCIAL

## 2024-01-30 VITALS
BODY MASS INDEX: 42.21 KG/M2 | DIASTOLIC BLOOD PRESSURE: 82 MMHG | HEIGHT: 69 IN | SYSTOLIC BLOOD PRESSURE: 136 MMHG | WEIGHT: 285 LBS

## 2024-01-30 DIAGNOSIS — Z01.419 WELL WOMAN EXAM: Primary | ICD-10-CM

## 2024-01-30 DIAGNOSIS — N64.52 NIPPLE DISCHARGE: ICD-10-CM

## 2024-01-30 PROCEDURE — 99395 PREV VISIT EST AGE 18-39: CPT

## 2024-02-21 PROBLEM — Z01.419 ROUTINE GYNECOLOGICAL EXAMINATION: Status: RESOLVED | Noted: 2020-06-30 | Resolved: 2024-02-21

## 2024-02-29 ENCOUNTER — TELEPHONE (OUTPATIENT)
Age: 24
End: 2024-02-29

## 2024-02-29 NOTE — TELEPHONE ENCOUNTER
Patient called to check the status of her prior auth for carmen birth control, prior auth sent to clinical team in January. Please advise and call patient back in regards to the status of that.

## 2024-03-01 NOTE — TELEPHONE ENCOUNTER
[facility] [address], [address]  Consent for Procedure/Sedation    Date:     Time:       1.  I authorize the performance upon Sana Expose the following:  Dual chamber automatic internal cardiac defibrillator generator Please call patient with codes and ask her to call to make sure it is a covered benefit.   Signature of person authorized to consent for patient:        Relationship to patient:    ________________________________    ___________________    Witness: _________________________      Date: ___________________    Printed: 12/21/2017   9:48 AM  Patient

## 2024-03-12 ENCOUNTER — OFFICE VISIT (OUTPATIENT)
Dept: PSYCHIATRY | Facility: CLINIC | Age: 24
End: 2024-03-12
Payer: COMMERCIAL

## 2024-03-12 DIAGNOSIS — F41.1 GENERALIZED ANXIETY DISORDER: ICD-10-CM

## 2024-03-12 DIAGNOSIS — F31.32 BIPOLAR 1 DISORDER, DEPRESSED, MODERATE (HCC): Primary | ICD-10-CM

## 2024-03-12 DIAGNOSIS — R41.840 POOR CONCENTRATION: ICD-10-CM

## 2024-03-12 PROCEDURE — 99214 OFFICE O/P EST MOD 30 MIN: CPT

## 2024-03-12 RX ORDER — VENLAFAXINE HYDROCHLORIDE 150 MG/1
150 CAPSULE, EXTENDED RELEASE ORAL DAILY
Qty: 90 CAPSULE | Refills: 0 | Status: SHIPPED | OUTPATIENT
Start: 2024-03-12

## 2024-03-12 RX ORDER — ATOMOXETINE 60 MG/1
60 CAPSULE ORAL DAILY
Qty: 90 CAPSULE | Refills: 0 | Status: SHIPPED | OUTPATIENT
Start: 2024-03-12

## 2024-03-12 RX ORDER — LURASIDONE HYDROCHLORIDE 40 MG/1
40 TABLET, FILM COATED ORAL
Qty: 90 TABLET | Refills: 0 | Status: SHIPPED | OUTPATIENT
Start: 2024-03-12

## 2024-03-12 NOTE — BH CRISIS PLAN
Camelia Safety Plan    Creation Date: 3/12/24       Step 1: Warning Signs:   Warning Signs   racing thoughts, not being able to rest            Step 2: Internal Coping Strategies:   Internal Coping Strategies   mindfullness, breathing technique            Step 3: People and social settings that provide distraction:   Name Contact Information   speak to my co-worker,           Step 4: People whom I can ask for help during a crisis:    Name Contact Information    Mother [phone]      Step 5: Professionals or agencies I can contact during a crisis:    Clinican/Agency Name Phone Emergency Contact     psychiatry      Local Emergency Department Emergency Department Phone Emergency   Department Address    [address]        Crisis Phone Numbers:   Suicide Prevention Lifeline: Call or Text  178 Crisis Text Line: Text HOME   to [phone]   Please note: Some [facility] do not have a separate number for   Child/Adolescent specific crisis. If your county is not listed under   Child/Adolescent, please call the adult number for your county      Adult Crisis Numbers: Child/Adolescent Crisis Numbers   [facility]: [phone] [facility]: [phone]   [facility]: [phone] [facility]: [phone]   [facility]: [phone] [place]: [phone]   [facility]: [phone] [place]: [phone]   [facility]: [phone]   [facility]: [phone]   [facility]: [phone]   [facility]: [phone] (daytime) [phone]   (after hours, weekends, holidays)      Step 6: Making the environment safer (plan for lethal means safety):   Patient did not identify any lethal methods: Yes     Optional: What is most important to me and worth living for?      Camelia Safety Plan. Arabella Krueger and Candido Garcia. Used with   permission of the authors.

## 2024-03-12 NOTE — BH TREATMENT PLAN
TREATMENT PLAN (Medication Management Only)        [facility]    Name and Date of Birth:  Laurence Kim 23 y.o. 2000  Date of Treatment Plan: March 12, 2024  Diagnosis/Diagnoses:    1. Bipolar 1 disorder, depressed, moderate (HCC)    2. Generalized anxiety disorder    3. Poor concentration      Strengths/Personal Resources for Self-Care: supportive family, supportive friends, taking medications as prescribed, ability to communicate well, ability to understand psychiatric illness, independence.  Area/Areas of need (in own words): anxiety symptoms, depressive symptoms, mood instability, ADHD symptoms  1. Long Term Goal: continue improvement in mood stability.  Target Date:6 months - 9/12/2024  Person/Persons responsible for completion of goal: Laurence  2.  Short Term Objective (s) - How will we reach this goal?:   A. Provider new recommended medication/dosage changes and/or continue medication(s): continue current medications as prescribed.  B. Attend medication management appointments regularly.  C. Take psychiatric medications responsibly.  Target Date:6 months - 9/12/2024  Person/Persons Responsible for Completion of Goal: Laurence and amita ojeda  Progress Towards Goals: continuing treatment  Treatment Modality: medication management every 2 months  Review due 180 days from date of this plan: 6 months - 9/12/2024  Expected length of service: maintenance  My Physician/PA/NP and I have developed this plan together and I agree to work on the goals and objectives. I understand the treatment goals that were developed for my treatment.

## 2024-03-12 NOTE — PSYCH
Regular Visit    Problem List Items Addressed This Visit    None  Visit Diagnoses       Bipolar 1 disorder, depressed, moderate (HCC)    -  Primary    Generalized anxiety disorder        Poor concentration                   Encounter provider SERVANDO Peralta    Provider located at    [facility] [address] [facility] [address] [phone]    Recent Visits  No visits were found meeting these conditions.  Showing recent visits within past 7 days and meeting all other requirements  Future Appointments  No visits were found meeting these conditions.  Showing future appointments within next 150 days and meeting all other requirements       HPI     Current Outpatient Medications   Medication Sig Dispense Refill    albuterol (PROVENTIL HFA,VENTOLIN HFA) 90 mcg/act inhaler 2 puff(s)      atoMOXetine (STRATTERA) 60 mg capsule Take 1 capsule (60 mg total) by mouth daily 90 capsule 1    lurasidone (Latuda) 40 mg tablet Take 1 tablet (40 mg total) by mouth daily with dinner 90 tablet 1    ondansetron (ZOFRAN) 4 mg tablet Take 1 tablet (4 mg total) by mouth every 8 (eight) hours as needed for nausea or vomiting 20 tablet 0    venlafaxine (EFFEXOR-XR) 150 mg 24 hr capsule Take 1 capsule (150 mg total) by mouth daily 90 capsule 1     No current facility-administered medications for this visit.       Review of Systems        MEDICATION MANAGEMENT NOTE        [facility]    Name and Date of Birth:  Laurence Kim 23 y.o. 2000 MRN: [id number]    Date of Visit: March 12, 2024    Allergies   Allergen Reactions    Zolpidem Hallucinations and Other (See Comments)     Other reaction(s): Hallucinations     SUBJECTIVE:    Laurence is seen today for a follow up for Bipolar Disorder, Generalized Anxiety Disorder and insomnia. She continues to do fairly well since the last visit.  Patient describes some recent  anxiety she has been feeling, states she feels behind in life in terms of occupational, financial stressors.  Working hard to improve her situation, reports symptoms are controllable and believes her medication regimen is effective.  Reports 3/10 anxiety and depression with feelings of dysphoria, low energy, frequent worry.  Denies any significant mood elevation or any grandiosity, insomnia, impulsive behavior, hypertalkativeness.  Mood appears controlled, calm, euthymic during today's encounter.  She is taking medication as prescribed, has yet to complete pending antipsychotic lab work since last year.  Educated patient about the importance of completing blood work, labs printed out as a reminder for patient and she plans to complete as well as possible. 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 Consider psychotherapy, denies SI.      She denies any side effects from medications.    PLAN:    -Continue Latuda to 40mg. Denies all side effects, takes with food. -Complete pending antipsychotic blood work from last year, labs printed out and gave pt lab slips.   Patient education completed for Latuda, risks include:  NMS, leukopenia, neutropenia, tardive dyskinesia, angioedema, suicidality, diabetes, orthostatic hypotension, somnolence or insomnia, GI upset, hyperprolactinemia, akathisia, and possible weight gain (less than other SGA's), etc.     -Continue Effexor 150mg daily, denies all side effects bp 125/89  PARQ completed including serotonin syndrome, SIADH, worsening depression, suicidality, induction of [place], GI upset, headaches, activation, sexual side effects, sedation, potential drug interactions, and others.     -Continue Strattera to 60 mg for poor concentration and attention symptoms.      Aware of 24 hour and weekend coverage for urgent situations accessed by calling [facility] main practice number  Referral for individual psychotherapy    Diagnoses and all orders for this  visit:    Bipolar 1 disorder, depressed, moderate (HCC)    Generalized anxiety disorder    Poor concentration            Current Outpatient Medications on File Prior to Visit   Medication Sig Dispense Refill    albuterol (PROVENTIL HFA,VENTOLIN HFA) 90 mcg/act inhaler 2 puff(s)      atoMOXetine (STRATTERA) 60 mg capsule Take 1 capsule (60 mg total) by mouth daily 90 capsule 1    lurasidone (Latuda) 40 mg tablet Take 1 tablet (40 mg total) by mouth daily with dinner 90 tablet 1    ondansetron (ZOFRAN) 4 mg tablet Take 1 tablet (4 mg total) by mouth every 8 (eight) hours as needed for nausea or vomiting 20 tablet 0    venlafaxine (EFFEXOR-XR) 150 mg 24 hr capsule Take 1 capsule (150 mg total) by mouth daily 90 capsule 1     No current facility-administered medications on file prior to visit.         HPI ROS Appetite Changes and Sleep:     She reports difficulty falling asleep, frequent awakenings, adequate appetite, low energy. Denies homicidal ideation, denies suicidal ideation    Review Of Systems:      HPI ROS:               Medication Side Effects:  denies    Depression (10 worst): 3/10    Anxiety (10 worst): 3-6/10    Safety concerns (SI, HI, etc): Denies si and hi    Sleep: 6 hrs    Energy: Fair to low    Appetite: 2-3 meals    Weight Change: denies        Mental Status Evaluation:    Appearance Adequate hygiene and grooming   Behavior calm and cooperative   Mood anxious  Depression Scale - 3 of 10 (0 = No depression)  Anxiety Scale - 5 of 10 (0 = No anxiety)   Speech Normal rate and volume   Affect mood-congruent   Thought Processes Goal directed and coherent   Thought Content Does not verbalize delusional material   Associations Tightly connected   Perceptual Disturbances Denies hallucinations and does not appear to be responding to internal stimuli   Risk Potential Suicidal/Homicidal Ideation - No evidence of suicidal or homicidal ideation and patient does not verbalize suicidal or homicidal ideation  Risk of  Violence - No evidence of risk for violence found on assessment  Risk of Self Mutilation - No evidence of risk for self mutilation found on assessment   Orientation oriented to person, place, time/date and situation   Memory recent and remote memory grossly intact   Consciousness alert and awake   Attention/Concentration attention span and concentration appear shorter than expected for age   Insight intact   Judgement intact   Muscle Strength and Gait normal muscle strength and normal muscle tone, normal gait/station and normal balance   Motor Activity no abnormal movements   Language no difficulty naming common objects, no difficulty repeating a phrase, no difficulty writing a sentence   Fund of Knowledge adequate knowledge of current events  adequate fund of knowledge regarding past history  adequate fund of knowledge regarding vocabulary      Traumatic History Update:     New Onset of Abuse Since Last Encounter:   no  Traumatic Events Since Last Encounter:   no    Past Medical History:    Past Medical History:   Diagnosis Date    Depression     Psychiatric disorder     Schizophrenia (HCC)         No past surgical history on file.  Allergies   Allergen Reactions    Zolpidem Hallucinations and Other (See Comments)     Other reaction(s): Hallucinations     Substance Abuse History:    Social History     Substance and Sexual Activity   Alcohol Use No     Social History     Substance and Sexual Activity   Drug Use Yes    Types: Marijuana     Social History:    Social History     Socioeconomic History    Marital status: Significant Other     Spouse name: Not on file    Number of children: Not on file    Years of education: Not on file    Highest education level: Not on file   Occupational History    Not on file   Tobacco Use    Smoking status: Never    Smokeless tobacco: Never   Vaping Use    Vaping status: Never Used   Substance and Sexual Activity    Alcohol use: No    Drug use: Yes     Types: Marijuana    Sexual  activity: Yes     Partners: Female   Other Topics Concern    Not on file   Social History Narrative    Not on file     Social Determinants of Health     Financial Resource Strain: Not on file   Food Insecurity: Not on file   Transportation Needs: Not on file   Physical Activity: Not on file   Stress: Not on file   Social Connections: Not on file   Intimate Partner Violence: Not on file   Housing Stability: Not on file     Family Psychiatric History:     Family History   Problem Relation Age of Onset    Leukemia Mother      History Review:The following portions of the patient's history were reviewed and updated as appropriate: allergies, current medications, past family history, past medical history, past social history, past surgical history and problem list     OBJECTIVE:     Vital signs in last 24 hours:    There were no vitals filed for this visit.  Laboratory Results:   Recent Labs (last 2 months):   No visits with results within 2 Month(s) from this visit.   Latest known visit with results is:   Office Visit on 09/11/2023   Component Date Value    LEUKOCYTE ESTERASE,UA 09/11/2023 negative     NITRITE,UA 09/11/2023 negative     SL AMB POCT UROBILINOGEN 09/11/2023 0.2     POCT URINE PROTEIN 09/11/2023 negative      PH,UA 09/11/2023 5.0     BLOOD,UA 09/11/2023 negative     SPECIFIC GRAVITY,UA 09/11/2023 1.030     KETONES,UA 09/11/2023 negative     BILIRUBIN,UA 09/11/2023 negative     GLUCOSE, UA 09/11/2023 negative      COLOR,UA 09/11/2023 yellow     CLARITY,UA 09/11/2023 hazy     URINE HCG 09/11/2023 negative      I have personally reviewed all pertinent laboratory/tests results.    Suicide/Homicide Risk Assessment:    Risk of Harm to Self:  Protective Factors: no current suicidal ideation, access to mental health treatment, compliant with medications, compliant with mental health treatment, good self-esteem, having a desire to be alive, having a desire to live, having a sense of purpose or meaning in life  Based  on today's assessment, Laurence presents the following risk of harm to self: minimal    Risk of Harm to Others:  Based on today's assessment, Laurence presents the following risk of harm to others: minimal    The following interventions are recommended: referral for psychotherapy    Medications Risks/Benefits:      Risks, Benefits And Possible Side Effects Of Medications:    Discussed risks and benefits of treatment with patient including risk of suicidality, serotonin syndrome, increased QTc interval and SIADH related to treatment with antidepressants; Risk of induction of manic symptoms in certain patient populations and risk of parkinsonian symptoms, metabolic syndrome, tardive dyskinesia and neuroleptic malignant syndrome related to treatment with antipsychotic medications     Controlled Medication Discussion:     Not applicable    Treatment Plan:    Due for update/Updated:   no  Last treatment plan done 3/12, due 9/12/24    SERVANDO Peralta 03/12/24    Visit Time    Visit Start Time: 230  Visit Stop Time: 250  Total Visit Duration:  20 minutes

## 2024-03-18 VITALS — SYSTOLIC BLOOD PRESSURE: 125 MMHG | DIASTOLIC BLOOD PRESSURE: 89 MMHG | HEART RATE: 99 BPM

## 2024-03-26 ENCOUNTER — PROCEDURE VISIT (OUTPATIENT)
Dept: OBGYN CLINIC | Facility: CLINIC | Age: 24
End: 2024-03-26
Payer: COMMERCIAL

## 2024-03-26 VITALS
HEIGHT: 69 IN | BODY MASS INDEX: 40.88 KG/M2 | WEIGHT: 276 LBS | SYSTOLIC BLOOD PRESSURE: 124 MMHG | DIASTOLIC BLOOD PRESSURE: 88 MMHG

## 2024-03-26 DIAGNOSIS — Z30.430 ENCOUNTER FOR IUD INSERTION: ICD-10-CM

## 2024-03-26 DIAGNOSIS — Z32.02 NEGATIVE PREGNANCY TEST: Primary | ICD-10-CM

## 2024-03-26 LAB — SL AMB POCT URINE HCG: NORMAL

## 2024-03-26 PROCEDURE — 58300 INSERT INTRAUTERINE DEVICE: CPT

## 2024-03-26 PROCEDURE — 81025 URINE PREGNANCY TEST: CPT

## 2024-03-26 NOTE — PROGRESS NOTES
Iud insertions    Date/Time: 3/26/2024 11:30 AM    Performed by: SERVANDO Connell  Authorized by: Coby Chaudhry MD    Other Assisting Provider: No    Verbal consent obtained?: Yes    Written consent obtained?: Yes    Risks and benefits: Risks, benefits and alternatives were discussed    Consent given by:  Patient  Time Out:     Time out: Immediately prior to the procedure a time out was called      Time out performed at:  3/26/2024 11:30 AM  Patient states understanding of procedure being performed: Yes    Patient's understanding of procedure matches consent: Yes    Procedure consent matches procedure scheduled: Yes    Relevant documents present and verified: Yes    Test results available and properly labeled: Yes    Radiology Images displayed and confirmed. If images not available, report reviewed: Yes    Required items: Required blood products, implants, devices and special equipment available    Patient identity confirmed:  Verbally with patient  Procedure:     Pelvic exam performed: yes      Negative GC/chlamydia test: no      Negative urine pregnancy test: yes      Negative serum pregnancy test: no      Cervix cleaned and prepped: yes      Speculum placed in vagina: yes      Tenaculum applied to cervix: no      Allis applied to cervix: no      Uterus sounded: yes      Uterus sound depth (cm):  8    IUD inserted with no complications: yes      IUD type:  Estefania    Strings trimmed: yes    Post-procedure:     Patient tolerated procedure well: yes    Comments:      Pt for IUD insertion today. Pt has already been previously counseled on all risks/benefits of IUD usage/insertion.  I again reviewed with pt risks of insertion including infection, perforation and expulsion. Reviewed with pt increased ectopic risk, migration, PID, infertility, high risk pregnancy if a pregnancy were to occur. I reviewed possible irregular menses and side effects of Estefania. We reviewed changes in menstrual cycles that may occur.    Pt aware 3 year coverage for birth control but can have it removed at any point if she desires.   Pt tolerated procedure well. Aware nothing PV x 48 hours. Will call with any cramping that does not resolve, fevers/chills. Etc.

## 2024-05-16 PROBLEM — F31.9 BIPOLAR 1 DISORDER (HCC): Status: ACTIVE | Noted: 2024-05-16

## 2024-05-21 ENCOUNTER — OFFICE VISIT (OUTPATIENT)
Dept: FAMILY MEDICINE CLINIC | Facility: CLINIC | Age: 24
End: 2024-05-21
Payer: COMMERCIAL

## 2024-05-21 VITALS
HEART RATE: 110 BPM | HEIGHT: 69 IN | SYSTOLIC BLOOD PRESSURE: 122 MMHG | BODY MASS INDEX: 40.58 KG/M2 | TEMPERATURE: 98 F | OXYGEN SATURATION: 98 % | RESPIRATION RATE: 16 BRPM | DIASTOLIC BLOOD PRESSURE: 84 MMHG | WEIGHT: 274 LBS

## 2024-05-21 DIAGNOSIS — F33.9 RECURRENT DEPRESSION (HCC): ICD-10-CM

## 2024-05-21 DIAGNOSIS — R53.83 FATIGUE, UNSPECIFIED TYPE: ICD-10-CM

## 2024-05-21 DIAGNOSIS — F31.9 BIPOLAR 1 DISORDER (HCC): ICD-10-CM

## 2024-05-21 DIAGNOSIS — G89.29 CHRONIC RIGHT SHOULDER PAIN: ICD-10-CM

## 2024-05-21 DIAGNOSIS — M54.2 NECK PAIN: ICD-10-CM

## 2024-05-21 DIAGNOSIS — D64.9 ANEMIA, UNSPECIFIED TYPE: ICD-10-CM

## 2024-05-21 DIAGNOSIS — R06.81 APNEIC EPISODE: ICD-10-CM

## 2024-05-21 DIAGNOSIS — M25.511 CHRONIC RIGHT SHOULDER PAIN: ICD-10-CM

## 2024-05-21 DIAGNOSIS — M54.9 MID BACK PAIN: ICD-10-CM

## 2024-05-21 DIAGNOSIS — J30.1 SEASONAL ALLERGIC RHINITIS DUE TO POLLEN: ICD-10-CM

## 2024-05-21 DIAGNOSIS — K21.9 GASTROESOPHAGEAL REFLUX DISEASE, UNSPECIFIED WHETHER ESOPHAGITIS PRESENT: ICD-10-CM

## 2024-05-21 DIAGNOSIS — Z13.220 LIPID SCREENING: ICD-10-CM

## 2024-05-21 DIAGNOSIS — G47.00 INSOMNIA, UNSPECIFIED TYPE: ICD-10-CM

## 2024-05-21 DIAGNOSIS — J45.20 MILD INTERMITTENT ASTHMA WITHOUT COMPLICATION: Primary | ICD-10-CM

## 2024-05-21 PROBLEM — Z30.017 NEXPLANON INSERTION: Status: RESOLVED | Noted: 2018-05-08 | Resolved: 2024-05-21

## 2024-05-21 PROBLEM — Z01.419 WELL WOMAN EXAM: Status: RESOLVED | Noted: 2019-03-13 | Resolved: 2024-05-21

## 2024-05-21 PROCEDURE — 99204 OFFICE O/P NEW MOD 45 MIN: CPT | Performed by: PHYSICIAN ASSISTANT

## 2024-05-21 RX ORDER — PANTOPRAZOLE SODIUM 20 MG/1
20 TABLET, DELAYED RELEASE ORAL
Qty: 90 TABLET | Refills: 1 | Status: SHIPPED | OUTPATIENT
Start: 2024-05-21

## 2024-05-21 RX ORDER — SUCRALFATE 1 G/1
1 TABLET ORAL 4 TIMES DAILY
Qty: 40 TABLET | Refills: 0 | Status: SHIPPED | OUTPATIENT
Start: 2024-05-21

## 2024-05-21 RX ORDER — CLINDAMYCIN HYDROCHLORIDE 300 MG/1
CAPSULE ORAL
COMMUNITY
Start: 2024-04-24 | End: 2024-05-21

## 2024-05-21 NOTE — ASSESSMENT & PLAN NOTE
- Symptomatic primarily with illness.  Otherwise does not need a rescue inhaler.  Last time she used her albuterol was about 2 months ago.

## 2024-05-21 NOTE — ASSESSMENT & PLAN NOTE
- About a year ago she states she did sublux her shoulder and PT was recommended but she did not go.  She still has persistent right shoulder pain  - Refer to PT

## 2024-05-21 NOTE — ASSESSMENT & PLAN NOTE
- She states her symptoms have increased.  She has not been taking any medications over-the-counter  - Start pantoprazole 20 mg daily on an empty stomach  - Carafate 1 g 4 times daily for 10 days  - Refer to gastroenterology since she has not been scoped since around 2016.

## 2024-05-21 NOTE — ASSESSMENT & PLAN NOTE
- Advised on importance of treatment for possible sleep apnea.  Advised on possible cardiac complications of uncontrolled sleep apnea  - Refer to sleep medicine

## 2024-05-21 NOTE — PROGRESS NOTES
Ambulatory Visit  Name: Laurence Kim      : 2000      MRN: [id number]  Encounter Provider: Gunjan Mcneal PA-C  Encounter Date: 2024   Encounter department: [facility]    Assessment & Plan   1. Mild intermittent asthma without complication  Assessment & Plan:  - Symptomatic primarily with illness.  Otherwise does not need a rescue inhaler.  Last time she used her albuterol was about 2 months ago.  Orders:  -     CBC and differential  -     Comprehensive metabolic panel  -     Iron Panel (Includes Ferritin, Iron Sat%, Iron, and TIBC)  -     Lipid panel  -     TSH, 3rd generation with Free T4 reflex  2. Bipolar 1 disorder (HCC)  Assessment & Plan:  - She does follow with psychiatry and she states her symptoms are under good control with medication  Orders:  -     CBC and differential  -     Comprehensive metabolic panel  -     Iron Panel (Includes Ferritin, Iron Sat%, Iron, and TIBC)  -     Lipid panel  -     TSH, 3rd generation with Free T4 reflex  3. Recurrent depression (HCC)  Assessment & Plan:  - She does follow with psychiatry she states her symptoms are under good control with the medications  Orders:  -     CBC and differential  -     Comprehensive metabolic panel  -     Iron Panel (Includes Ferritin, Iron Sat%, Iron, and TIBC)  -     Lipid panel  -     TSH, 3rd generation with Free T4 reflex  4. Seasonal allergic rhinitis due to pollen  Assessment & Plan:  - She states that her symptoms are not that significant at this time.  She does utilize over-the-counter antihistamines when needed  Orders:  -     CBC and differential  -     Comprehensive metabolic panel  -     Iron Panel (Includes Ferritin, Iron Sat%, Iron, and TIBC)  -     Lipid panel  -     TSH, 3rd generation with Free T4 reflex  5. Gastroesophageal reflux disease, unspecified whether esophagitis present  Assessment & Plan:  - She states her symptoms have increased.  She has not been taking any  medications over-the-counter  - Start pantoprazole 20 mg daily on an empty stomach  - Carafate 1 g 4 times daily for 10 days  - Refer to gastroenterology since she has not been scoped since around 2016.  Orders:  -     pantoprazole (PROTONIX) 20 mg tablet; Take 1 tablet (20 mg total) by mouth daily before breakfast  -     sucralfate (CARAFATE) 1 g tablet; Take 1 tablet (1 g total) by mouth 4 (four) times a day  -     Ambulatory Referral to Gastroenterology; Future  -     CBC and differential  -     Comprehensive metabolic panel  -     Iron Panel (Includes Ferritin, Iron Sat%, Iron, and TIBC)  -     Lipid panel  -     TSH, 3rd generation with Free T4 reflex  6. Anemia, unspecified type  -     CBC and differential  -     Comprehensive metabolic panel  -     Iron Panel (Includes Ferritin, Iron Sat%, Iron, and TIBC)  -     Lipid panel  -     TSH, 3rd generation with Free T4 reflex  7. Neck pain  Assessment & Plan:  - Refer to PT  - Apply heat 3 times daily for 10 minutes as needed  Orders:  -     CBC and differential  -     Comprehensive metabolic panel  -     Iron Panel (Includes Ferritin, Iron Sat%, Iron, and TIBC)  -     Lipid panel  -     TSH, 3rd generation with Free T4 reflex  8. Chronic right shoulder pain  Assessment & Plan:  - About a year ago she states she did sublux her shoulder and PT was recommended but she did not go.  She still has persistent right shoulder pain  - Refer to PT  Orders:  -     CBC and differential  -     Comprehensive metabolic panel  -     Iron Panel (Includes Ferritin, Iron Sat%, Iron, and TIBC)  -     Lipid panel  -     TSH, 3rd generation with Free T4 reflex  9. Mid back pain  Assessment & Plan:  - Refer to PT  -Apply heat as needed 3 times daily for 10 minutes  Orders:  -     CBC and differential  -     Comprehensive metabolic panel  -     Iron Panel (Includes Ferritin, Iron Sat%, Iron, and TIBC)  -     Lipid panel  -     TSH, 3rd generation with Free T4 reflex  10. Apneic  episode  Assessment & Plan:  - Advised on importance of treatment for possible sleep apnea.  Advised on possible cardiac complications of uncontrolled sleep apnea  - Refer to sleep medicine  Orders:  -     Ambulatory Referral to Sleep Medicine; Future  -     CBC and differential  -     Comprehensive metabolic panel  -     Iron Panel (Includes Ferritin, Iron Sat%, Iron, and TIBC)  -     Lipid panel  -     TSH, 3rd generation with Free T4 reflex  11. Insomnia, unspecified type  -     Ambulatory Referral to Sleep Medicine; Future  -     CBC and differential  -     Comprehensive metabolic panel  -     Iron Panel (Includes Ferritin, Iron Sat%, Iron, and TIBC)  -     Lipid panel  -     TSH, 3rd generation with Free T4 reflex  12. Fatigue, unspecified type  -     CBC and differential  -     Comprehensive metabolic panel  -     Iron Panel (Includes Ferritin, Iron Sat%, Iron, and TIBC)  -     Lipid panel  -     TSH, 3rd generation with Free T4 reflex  13. BMI 40.0-44.9, adult (HCC)  Assessment & Plan:  - She will continue with increase protein in diet which she started recently and has lost some weight  - I did recommend starting walking at least 10 minutes a day and every several weeks increase by a few more minutes  - Weigh self daily  - Portion size size of fist  - I did talk to her about Wegovy and Saxenda for weight loss.  I did give her the names of the medication to see if she can find it at the pharmacy and if it is covered by her insurance if she would consider trying these medications for weight loss.  She denies a family history or personal history of thyroid cancer  -Labs ordered  - Recheck weight in 2 months at follow-up  Orders:  -     CBC and differential  -     Comprehensive metabolic panel  -     Iron Panel (Includes Ferritin, Iron Sat%, Iron, and TIBC)  -     Lipid panel  -     TSH, 3rd generation with Free T4 reflex  14. Lipid screening  -     CBC and differential  -     Comprehensive metabolic panel  -      Iron Panel (Includes Ferritin, Iron Sat%, Iron, and TIBC)  -     Lipid panel  -     TSH, 3rd generation with Free T4 reflex    -I did recommend follow-up in 2 months to include her annual physical    M*Qustod[employer] software was used to dictate this note. It may contain errors with dictating incorrect words/spelling. Please contact provider directly for any questions.        History of Present Illness     Patient presents today to establish care.  She does have multiple concerns.    -She states that she has mid back pain which has been increasing.  Now she is noticing pain when she takes a deep breath.  She also has right shoulder pain ever since she subluxed her shoulder about a year ago.  She states she was advised to go to PT at that time but did not feel as though it was necessary.  She states that she does get neck pain also.  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  No current treatment.  She does sleep on her stomach or her side.    -She states that she has been having apneic events when sleeping.  She states a friend did bring this to her attention and did recommend she see somebody for possible sleep apnea.  She states that she does notice she does wake up frequently throughout the night.    -She also has concerns about her weight.  She states that she has been increasing the protein in her diet.  She has been watching her portion sizes.  She has lost some weight.    -She states her asthma primarily flares when she gets sick.  She states the last episode was several months ago.  Otherwise she does not need her rescue inhaler.  She states that her allergy symptoms have not been too bad at this time.    -She also has a history of reflux and she has been noticing that her symptoms are reoccurring more.  She is to be on omeprazole in the past.  She has not taken anything over-the-counter for her symptoms.  She states her last scope was years ago when she was in high school which may have been around 2016.  She  "denies any nausea or vomiting or changes in her bowels including blood.    -She does have a history of iron deficiency anemia.  She states she used to be on a prenatal vitamin but she does not take any vitamins at this time.  It has been a while since she has had a blood test.        Review of Systems   Constitutional:  Positive for fatigue. Negative for unexpected weight change.   Respiratory:  Negative for cough and shortness of breath.    Cardiovascular:  Negative for chest pain and leg swelling.   Gastrointestinal:         As stated in HPI   Psychiatric/Behavioral:          She does follow with psychiatry regularly for bipolar, anxiety/depression.  She states her symptoms are stable on her medications.       Objective     /84 (BP Location: Left arm, Patient Position: Sitting, Cuff Size: Large)   Pulse (!) 110   Temp 98 °F (36.7 °C) (Tympanic)   Resp 16   Ht 5' 9\" (1.753 m)   Wt 124 kg (274 lb)   SpO2 98%   BMI 40.46 kg/m²     Physical Exam  Vitals reviewed.   Constitutional:       General: She is not in acute distress.     Appearance: Normal appearance. She is well-developed. She is not ill-appearing, toxic-appearing or diaphoretic.   HENT:      Head: Normocephalic and atraumatic.   Neck:      Thyroid: No thyromegaly.   Cardiovascular:      Rate and Rhythm: Normal rate and regular rhythm.      Heart sounds: Normal heart sounds. No murmur heard.  Pulmonary:      Effort: Pulmonary effort is normal. No respiratory distress.      Breath sounds: Normal breath sounds. No wheezing, rhonchi or rales.   Abdominal:      General: Bowel sounds are normal.      Palpations: Abdomen is soft. There is no mass.      Tenderness: There is no abdominal tenderness.   Musculoskeletal:         General: No deformity.      Cervical back: Neck supple.      Right lower leg: No edema.      Left lower leg: No edema.      Comments: Cervical spine, right shoulder: She does have a forward posture on the right side.  She does " notice pain of the right shoulder with elevation above 90 degrees.   Lymphadenopathy:      Cervical: No cervical adenopathy.   Skin:     General: Skin is warm.   Neurological:      General: No focal deficit present.      Mental Status: She is alert.   Psychiatric:         Mood and Affect: Mood normal.         Behavior: Behavior normal.         Thought Content: Thought content normal.         Judgment: Judgment normal.       Administrative Statements

## 2024-05-21 NOTE — ASSESSMENT & PLAN NOTE
- She does follow with psychiatry she states her symptoms are under good control with the medications

## 2024-05-21 NOTE — ASSESSMENT & PLAN NOTE
- She will continue with increase protein in diet which she started recently and has lost some weight  - I did recommend starting walking at least 10 minutes a day and every several weeks increase by a few more minutes  - Weigh self daily  - Portion size size of fist  - I did talk to her about Wegovy and Saxenda for weight loss.  I did give her the names of the medication to see if she can find it at the pharmacy and if it is covered by her insurance if she would consider trying these medications for weight loss.  She denies a family history or personal history of thyroid cancer  -Labs ordered  - Recheck weight in 2 months at follow-up

## 2024-05-21 NOTE — ASSESSMENT & PLAN NOTE
- She does follow with psychiatry and she states her symptoms are under good control with medication

## 2024-05-21 NOTE — ASSESSMENT & PLAN NOTE
- She states that her symptoms are not that significant at this time.  She does utilize over-the-counter antihistamines when needed

## 2024-05-28 ENCOUNTER — OFFICE VISIT (OUTPATIENT)
Dept: GASTROENTEROLOGY | Facility: CLINIC | Age: 24
End: 2024-05-28
Payer: COMMERCIAL

## 2024-05-28 VITALS
SYSTOLIC BLOOD PRESSURE: 120 MMHG | TEMPERATURE: 98.6 F | DIASTOLIC BLOOD PRESSURE: 86 MMHG | HEART RATE: 106 BPM | BODY MASS INDEX: 31.43 KG/M2 | WEIGHT: 212.2 LBS | HEIGHT: 69 IN | OXYGEN SATURATION: 99 %

## 2024-05-28 DIAGNOSIS — R19.8 IRREGULAR BOWEL HABITS: ICD-10-CM

## 2024-05-28 DIAGNOSIS — R13.19 ESOPHAGEAL DYSPHAGIA: ICD-10-CM

## 2024-05-28 DIAGNOSIS — R10.30 LOWER ABDOMINAL PAIN: ICD-10-CM

## 2024-05-28 DIAGNOSIS — K21.9 GASTROESOPHAGEAL REFLUX DISEASE, UNSPECIFIED WHETHER ESOPHAGITIS PRESENT: Primary | ICD-10-CM

## 2024-05-28 DIAGNOSIS — K59.00 CONSTIPATION, UNSPECIFIED CONSTIPATION TYPE: ICD-10-CM

## 2024-05-28 PROCEDURE — 99204 OFFICE O/P NEW MOD 45 MIN: CPT | Performed by: DIETITIAN, REGISTERED

## 2024-05-28 NOTE — PATIENT INSTRUCTIONS
Laurence Kim  5/28/2024     Recommended Total Fiber Intake**    AGE  MEN  WOMAN    19-50  38 grams/day  25 grams/day    Over 50  30 grams/day  21 grams/day    Fiber Sources in Common Foods   Use this guide to find out if you have enough fiber in you diet.    Food  Size of Serving  Fiber Grams/Servings  Calories/   Serving  Food  Size of Serving  Fiber Grams/Servings  Calories/   Serving    Fruits: (raw unless otherwise noted  Vegetables: (cooked, unless otherwise noted)    Apple (w/peel)  1 medium  3.7  81  Artichoke  1 globe  6.5  60    Apricots  1 cup  3.7  74  Asparagus  ½ cup  1.8  25    Banana  1 medium  2.7  105  Beans:    Blackberries  1 cup  7.2  75  Green (canned)  ½ cup  1.3  14    Blueberries  1 cup  3.9  81  Kidney  ½ cup  5.7  114    Cantaloupe  1 cup  1.3  56  Lima  ½ cup  6.1  85    Grapefruit  1 medium  2.8  82  Freeman  ½ cup  7.4  118    Grapes  1 cup  1.6  114  White  ½ cup  5.5  122    Orange  1 medium  3.1  62  Beets  ½ cup  1.6  37    Pear (with peel)  1 medium  4.0  98  Broccoli  ½ cup  2.8  26    Pineapple  1 cup  1.9  76  Cabbage, green  ½ cup  2.1  16    Plums  1 medium  1.0  36  Cabbage, green (raw)  ½ cup  0.8  9    Prunes (dried)  1 cup  11.4  386  Carrots  ½ cup  2.6  35    Raspberries  1 cup  8.4  60  Cauliflower  ½ cup  2.0  17    Strawberries  1 cup  3.4  45  Cauliflower (raw)  ½ cup  1.3  13    Watermelon  1 slice  0.8  51  Celery (raw)  ½ cup  1.0  10    GRAIN PRODUCTS AND OTHERS:  Corn  ½ cup  2.0  66    Bread:  Cucumber (raw)  ½ cup  0.4  7    Divehi  1 slice  0.8  68  Eggplant  ½ cup  1.2  13    Rye  1 slice  1.6  67  Green Peas  ½ cup  4.4  62    White  1 slice  0.6  67  Lettuce, iceberg (raw)  ½ cup  0.4  4    Whole Wheat  1 slice  2.0  70  Onions (raw)  ½ cup  1.4  30    Cereal:  Potato (baked with skin)  ½ cup  1.5  66    Bran  1 ounce  9.7  70  Spinach  ½ cup  2.7  25    Corn Flakes  1 ounce  1.0  110  Tomato  ½ cup  1.0  19    Oat Bran  1 ounce  4.3  69  Zucchini  ½ cup   1.3  14    Oatmeal  1 ounce  3.0  109  METAMUCIL:    Shredded Wheat  1 ounce  2.8  102  Capsules  6 capsules  3.0  10    Crackers:  Smooth Texture Orange (sugar free)  1 tsp  3.0  20    Nino  1 square  0.1  27  Smooth Texture Orange (with sugar)  1 tbsp  3.0  45    Saltine  1 regular  0.1  13  Wafers  2 wafers  3.0  120    Rice:    Brown  ½ cup  1.8  108    White  ½ cup  0.3  103    Spaghetti  2 ounces  2.1  225    Almonds (roasted)  ½ cup  6.4  351    Peanuts (roasted)  ½ cup  6.1  388      ** [place] [employer], The [employer] Sciences, 2002   Track your fiber intake for five days. Use the Fiber Source Guide to find out how much fiber is in common food.     If you’re not getting your recommended amount of fiber each day, talk to your doctor about how you can increase the fiber in your diet. Example  Monday Tuesday Wednesday Thursday Friday    Food  Oatmeal    Fiber Grams  2.8    Food  Blueberries    Fiber Grams  3.9    Food  W.W. Bread    Fiber Grams  1.9    Food  W.W. Bread    Fiber Grams  1.9    Food  Apple    Fiber Grams  3.7    Food  Spaghetti    Fiber Grams  .14    Food  Corn    Fiber Grams  2.0    Food  White Bread    Fiber Grams  .6    Food    Fiber Grams    Food    Fiber Grams    Food    Fiber Grams    Food    Fiber Grams    Food    Fiber Grams    Food    Fiber Grams    Food    Fiber Grams    Food    Fiber Grams    Food    Fiber Grams    Food    Fiber Grams    Food    Fiber Grams    Food    Fiber Grams    Add numbers in each column to find your daily fiber intake.    Total Daily Fiber Intake  18.2      Too Low - Like most Americans, this example is not enough fiber. Talk to your doctor about how to add fiber to your diet.   Quick Fiber Facts    Most Americans consume only about half of the recommended fiber they need each day.    Fiber helps maintain normal bowel function, and helps prevent constipation and its potential complications. Straining and pressure from constipation may lead to  diverticular disease and hemorrhoids.    Stool softeners or stimulant laxatives only offer short-term relief of constipation, while dietary changes or fiber therapies help break the cycle of irregularity.    Diets low in saturated fat and cholesterol that include 7 grams of soluble fiber per day from psyllium husk, as in Metamucil, may reduce the risk of heart disease by lowering cholesterol. One adult dose of Metamucil has 2.4 grams of this soluable fiber.    Increase fiber intake gradually, giving the body time to adjust.     High Fiber Diet   WHAT YOU NEED TO KNOW:   What is a high-fiber diet?  A high-fiber diet includes foods that have a high amount of fiber. Fiber is the part of fruits, vegetables, and grains that is not broken down by your body. Fiber keeps your bowel movements regular. Fiber can also help lower your cholesterol level, control blood sugar in people with diabetes, and relieve constipation. Fiber can also help you control your weight because it helps you feel full faster. Most adults should eat 25 to 35 grams of fiber each day. Talk to your dietitian or healthcare provider about the amount of fiber you need.  What foods are good sources of fiber?       Foods with at least 4 grams of fiber per serving:      ? to ½ cup of high-fiber cereal (check the nutrition label on the box)    ½ cup of blackberries or raspberries    4 dried prunes    1 cooked artichoke    ½ cup of cooked legumes, such as lentils, or red, kidney, and flower beans    Foods with 1 to 3 grams of fiber per servin slice of whole-wheat, pumpernickel, or rye bread    ½ cup of cooked brown rice    4 whole-wheat crackers    1 cup of oatmeal    ½ cup of cereal with 1 to 3 grams of fiber per serving (check the nutrition label on the box)    1 small piece of fruit, such as an apple, banana, pear, kiwi, or orange    3 dates    ½ cup of canned apricots, fruit cocktail, peaches, or pears    ½ cup of raw or cooked vegetables, such as  carrots, cauliflower, cabbage, spinach, squash, or corn  What are some ways that I can increase fiber in my diet?   Choose brown or wild rice instead of white rice.     Use whole wheat flour in recipes instead of white or all-purpose flour.     Add beans and peas to casseroles or soups.     Choose fresh fruit and vegetables with peels or skins on instead of juices.    What other guidelines should I follow?   Add fiber to your diet slowly.  You may have abdominal discomfort, bloating, and gas if you add fiber to your diet too quickly.     Drink plenty of liquids as you add fiber to your diet.  You may have nausea or develop constipation if you do not drink enough water. Ask how much liquid to drink each day and which liquids are best for you.    CARE AGREEMENT:   You have the right to help plan your care. Discuss treatment options with your healthcare provider to decide what care you want to receive. You always have the right to refuse treatment. The above information is an  only. It is not intended as medical advice for individual conditions or treatments. Talk to your doctor, nurse or pharmacist before following any medical regimen to see if it is safe and effective for you.  © Copyright [employer] 2023 Information is for End User's use only and may not be sold, redistributed or otherwise used for commercial purposes.

## 2024-05-28 NOTE — PROGRESS NOTES
[facility] - Outpatient Consultation New Patient  Laurence Kim 23 y.o. female MRN: [id number]  Encounter: [id number]          ASSESSMENT AND PLAN:    1.  GERD  2.  Esophageal dysphagia  Patient reports she had acid reflux as a child and was prescribed omeprazole with resolution in symptoms.  Now she reports worsening GERD symptoms for the last few months with chronic nausea, few episodes of vomiting, and esophageal dysphagia with food/pills getting stuck/eventually going down with water.  Patient's mother has a history of esophageal strictures requiring dilation.  Patient has been trying to lose weight through diet/exercise, has lost about 30 lb so far.      -Continue pantoprazole 20 mg once daily 30 minutes before breakfast.  -Schedule EGD for further evaluation.      3.  Irregular bowel habits  4.  Lower abdominal pain  5.  Constipation  She also reports a history of chronic irregular bowel habits and chronic lower abdominal pain.  At times she feels very constipated, and other times she can have very frequent BMs.  Has associated abdominal bloating.  She denies any black/bloody stools.  Has hemorrhoids from time to time.  No family history of CRC, but mother has history of colon polyps.  Patient admits she sometimes forgets to drink water and does not currently get enough fiber in her diet.  She uses docusate as needed, which is somewhat helpful.    -Follow up CBC, CMP, TSH as ordered by PCP.  Also check celiac disease antibody profile.  -Aim for at least 64 oz water daily.  -Increase fiber intake to goal of 20-25 gm daily.  -Start MiraLAX 17 gm once daily, adjust dose up/down as needed for optimal bowel habits.  -Schedule colonoscopy at time of EGD per patient preference with GoLytely prep.    I obtained informed consent from the patient. The risks/benefits/alternatives of the procedure were discussed with the patient. Risks included, but not limited to, infection, bleeding,  perforation, injury to organs in the abdomen, missed lesion and incomplete procedure were discussed. Patient was agreeable and electronic signature was obtained.       Follow up 3 months.    ________________________________________________________    HPI:  Laurence Kim is a 23 y.o. female with history of asthma, seasonal allergies, irregular menses, bipolar 1 disorder, and recurrent depression who presents for evaluation of GERD and constipation.  Reviewed documentation from family medicine visit 5/21/2024, at which time patient reported worsening GERD symptoms and was started on pantoprazole 20 mg once daily as well as Carafate 1 g 4 times daily x 10 days.  She was also referred to GI for evaluation and management, since patient has not had an endoscopy since 2016.      Patient reports she had acid reflux as a child and was prescribed omeprazole with resolution in symptoms.  Now she reports worsening GERD symptoms for the last few months with chronic nausea, few episodes of vomiting, and esophageal dysphagia with food/pills getting stuck/eventually going down with water.  Patient's mother has a history of esophageal strictures requiring dilation.  Patient has been trying to lose weight through diet/exercise, has lost about 30 lb so far.      She also reports a history of chronic irregular bowel habits and chronic lower abdominal pain.  At times she feels very constipated, and other times she can have very frequent BMs.  Has associated abdominal bloating.  She denies any black/bloody stools.  Has hemorrhoids from time to time.  No family history of CRC, but mother has history of colon polyps.  Patient admits she sometimes forgets to drink water and does not currently get enough fiber in her diet.  She uses docusate as needed, which is somewhat helpful.    Patient had EGD/colonoscopy in 2016, no records available for review.    REVIEW OF SYSTEMS:  10 point ROS reviewed and negative, except as above    Historical  Information   Past Medical History:   Diagnosis Date   • Depression    • Psychiatric disorder    • Schizophrenia (HCC)      History reviewed. No pertinent surgical history.  Social History   Social History     Substance and Sexual Activity   Alcohol Use Yes    Comment: rarely     Social History     Substance and Sexual Activity   Drug Use Yes   • Types: Marijuana     Social History     Tobacco Use   Smoking Status Never   Smokeless Tobacco Never     Family History   Problem Relation Age of Onset   • Leukemia Mother        Meds/Allergies       Current Outpatient Medications:   •  albuterol (PROVENTIL HFA,VENTOLIN HFA) 90 mcg/act inhaler  •  atoMOXetine (STRATTERA) 60 mg capsule  •  lurasidone (Latuda) 40 mg tablet  •  pantoprazole (PROTONIX) 20 mg tablet  •  polyethylene glycol (GOLYTELY) 4000 mL solution  •  sucralfate (CARAFATE) 1 g tablet  •  venlafaxine (EFFEXOR-XR) 150 mg 24 hr capsule    Current Facility-Administered Medications:   •  levonorgestrel (DIOGENES) 13.5 mg intrauterine device (IUD), 1 each, Intrauterine Device, Once every 3 years, 1 Intra Uterine Device at 03/26/24 1242    Allergies   Allergen Reactions   • Zolpidem Hallucinations and Other (See Comments)     Other reaction(s): Hallucinations           Objective   Wt Readings from Last 3 Encounters:   05/28/24 96.3 kg (212 lb 3.2 oz)   05/21/24 124 kg (274 lb)   03/26/24 125 kg (276 lb)     Temp Readings from Last 3 Encounters:   05/28/24 98.6 °F (37 °C) (Tympanic)   05/21/24 98 °F (36.7 °C) (Tympanic)   09/11/23 97.6 °F (36.4 °C)     BP Readings from Last 3 Encounters:   05/28/24 120/86   05/21/24 122/84   03/26/24 124/88     Pulse Readings from Last 3 Encounters:   05/28/24 (!) 106   05/21/24 (!) 110   09/11/23 101        PHYSICAL EXAM:     Physical Exam  Vitals reviewed.   Constitutional:       General: She is not in acute distress.     Appearance: She is well-developed.   HENT:      Head: Normocephalic and atraumatic.   Eyes:       Conjunctiva/sclera: Conjunctivae normal.   Cardiovascular:      Rate and Rhythm: Normal rate and regular rhythm.      Heart sounds: No murmur heard.  Pulmonary:      Effort: Pulmonary effort is normal. No respiratory distress.      Breath sounds: Normal breath sounds.   Abdominal:      General: Bowel sounds are normal. There is no distension.      Palpations: Abdomen is soft.      Tenderness: There is no abdominal tenderness. There is no guarding.   Musculoskeletal:         General: No swelling.      Cervical back: Neck supple.   Skin:     General: Skin is warm and dry.   Neurological:      Mental Status: She is alert.   Psychiatric:         Mood and Affect: Mood normal.             Lab Results:   No visits with results within 1 Day(s) from this visit.   Latest known visit with results is:   Procedure visit on 03/26/2024   Component Date Value   • URINE HCG 03/26/2024 neg        Lab Results   Component Value Date    WBC 10.58 (H) 08/03/2019    HGB 13.2 08/03/2019    HCT 41.3 08/03/2019    MCV 80 (L) 08/03/2019     08/03/2019       Lab Results   Component Value Date    SODIUM 137 06/02/2020    K 4.6 06/02/2020     06/02/2020    CO2 24 06/02/2020    AGAP 8 06/02/2020    BUN 11 06/02/2020    CREATININE 0.75 06/02/2020    GLUC 74 06/02/2020    CALCIUM 9.3 06/02/2020    AST 11 06/02/2020    ALT 16 06/02/2020    ALKPHOS 71 06/02/2020    TP 7.4 06/02/2020    TBILI 0.3 06/02/2020    EGFR 114 06/02/2020         Radiology Results:   No results found.

## 2024-06-04 ENCOUNTER — OFFICE VISIT (OUTPATIENT)
Dept: PSYCHIATRY | Facility: CLINIC | Age: 24
End: 2024-06-04

## 2024-06-04 VITALS
BODY MASS INDEX: 39.58 KG/M2 | SYSTOLIC BLOOD PRESSURE: 130 MMHG | DIASTOLIC BLOOD PRESSURE: 80 MMHG | HEART RATE: 86 BPM | WEIGHT: 268 LBS

## 2024-06-04 DIAGNOSIS — R41.840 POOR CONCENTRATION: ICD-10-CM

## 2024-06-04 DIAGNOSIS — F41.1 GENERALIZED ANXIETY DISORDER: ICD-10-CM

## 2024-06-04 DIAGNOSIS — F31.32 BIPOLAR 1 DISORDER, DEPRESSED, MODERATE (HCC): Primary | ICD-10-CM

## 2024-06-04 RX ORDER — VENLAFAXINE HYDROCHLORIDE 150 MG/1
150 CAPSULE, EXTENDED RELEASE ORAL DAILY
Qty: 90 CAPSULE | Refills: 0 | Status: SHIPPED | OUTPATIENT
Start: 2024-06-04

## 2024-06-04 RX ORDER — ATOMOXETINE 80 MG/1
80 CAPSULE ORAL DAILY
Qty: 90 CAPSULE | Refills: 0 | Status: SHIPPED | OUTPATIENT
Start: 2024-06-04

## 2024-06-04 RX ORDER — LURASIDONE HYDROCHLORIDE 40 MG/1
40 TABLET, FILM COATED ORAL
Qty: 90 TABLET | Refills: 0 | Status: SHIPPED | OUTPATIENT
Start: 2024-06-04

## 2024-06-04 RX ORDER — VENLAFAXINE HYDROCHLORIDE 37.5 MG/1
37.5 CAPSULE, EXTENDED RELEASE ORAL DAILY
Qty: 90 CAPSULE | Refills: 0 | Status: SHIPPED | OUTPATIENT
Start: 2024-06-04

## 2024-06-04 NOTE — PSYCH
Regular Visit    Problem List Items Addressed This Visit    None  Visit Diagnoses       Bipolar 1 disorder, depressed, moderate (HCC)    -  Primary    Generalized anxiety disorder        Poor concentration                   Encounter provider SERVANDO Peralta    Provider located at    [facility] [address] [facility] [address] [phone]    Recent Visits  No visits were found meeting these conditions.  Showing recent visits within past 7 days and meeting all other requirements  Future Appointments  No visits were found meeting these conditions.  Showing future appointments within next 150 days and meeting all other requirements       HPI     Current Outpatient Medications   Medication Sig Dispense Refill    albuterol (PROVENTIL HFA,VENTOLIN HFA) 90 mcg/act inhaler 2 puff(s)      atoMOXetine (STRATTERA) 60 mg capsule Take 1 capsule (60 mg total) by mouth daily 90 capsule 0    lurasidone (Latuda) 40 mg tablet Take 1 tablet (40 mg total) by mouth daily with dinner 90 tablet 0    pantoprazole (PROTONIX) 20 mg tablet Take 1 tablet (20 mg total) by mouth daily before breakfast 90 tablet 1    polyethylene glycol (GOLYTELY) 4000 mL solution Take 4,000 mL by mouth once for 1 dose 4000 mL 0    sucralfate (CARAFATE) 1 g tablet Take 1 tablet (1 g total) by mouth 4 (four) times a day (Patient not taking: Reported on 5/28/2024) 40 tablet 0    venlafaxine (EFFEXOR-XR) 150 mg 24 hr capsule Take 1 capsule (150 mg total) by mouth daily (Patient not taking: Reported on 5/28/2024) 90 capsule 0     Current Facility-Administered Medications   Medication Dose Route Frequency Provider Last Rate Last Admin    levonorgestrel (DIOGENES) 13.5 mg intrauterine device (IUD)  1 each Intrauterine Device Once every 3 years    1 Intra Uterine Device at 03/26/24 1242       Review of Systems        MEDICATION MANAGEMENT NOTE        [facility]    Name and Date of Birth:  Laurence Kim 23 y.o. 2000 MRN: [id number]    Date of Visit: June 4, 2024    Allergies   Allergen Reactions    Zolpidem Hallucinations and Other (See Comments)     Other reaction(s): Hallucinations     SUBJECTIVE:    Laurence is seen today for a follow up for Bipolar Disorder, Generalized Anxiety Disorder and insomnia. She continues to do fairly well since the last visit.  Patient reports  from her fiancé 2 months ago, states it was her choice, and reports dealing with it as well as she can. She reports an increase in anxiety due to the stressor, requested a slight increase in Effexor and tolerating no side effects, will send in Effexor .5 mg daily for anxiety and depression control.  Depression 4/10 and anxiety 4/10. Mood continues to be stable with no periods of grandiosity, insomnia, hypertalkativeness, impulsive behaviors.  Latuda 40 mg daily continues to be effective and tolerating with no reported side effects.  She has recently lost weight and currently weighs 268 pounds.  She is yet to complete her pending antipsychotic blood work, she was reminded again regarding the importance and states she will complete within the month.  Her mood is appropriate, euthymic, pleasant during today's counter.  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  Continues to struggle with periods of inability to maintain attention, low productivity, procrastinating for which Strattera has been helpful to date, she requested a slight increase and Strattera 80 mg daily sent to her pharmacy, tolerating no adverse effects.  Reports adequate sleep and appetite.  Denies SI.      She denies any side effects from medications.    PLAN:    -Continue Latuda to 40mg. Denies all side effects, takes with food. -Complete pending antipsychotic blood work from last year, labs printed out and gave pt lab slips.   Patient education completed  for Latuda, risks include:  NMS, leukopenia, neutropenia, tardive dyskinesia, angioedema, suicidality, diabetes, orthostatic hypotension, somnolence or insomnia, GI upset, hyperprolactinemia, akathisia, and possible weight gain (less than other SGA's), etc.     -Titrate Effexor 187.5mg daily for improved anxiety control, denies all side effects bp 130/80  PARQ completed including serotonin syndrome, SIADH, worsening depression, suicidality, induction of [place], GI upset, headaches, activation, sexual side effects, sedation, potential drug interactions, and others.     -Titrate Strattera to 80 mg for poor concentration and attention symptoms.      Aware of 24 hour and weekend coverage for urgent situations accessed by calling [facility] main practice number  Referral for individual psychotherapy    Diagnoses and all orders for this visit:    Bipolar 1 disorder, depressed, moderate (HCC)    Generalized anxiety disorder    Poor concentration            Current Outpatient Medications on File Prior to Visit   Medication Sig Dispense Refill    albuterol (PROVENTIL HFA,VENTOLIN HFA) 90 mcg/act inhaler 2 puff(s)      atoMOXetine (STRATTERA) 60 mg capsule Take 1 capsule (60 mg total) by mouth daily 90 capsule 0    lurasidone (Latuda) 40 mg tablet Take 1 tablet (40 mg total) by mouth daily with dinner 90 tablet 0    pantoprazole (PROTONIX) 20 mg tablet Take 1 tablet (20 mg total) by mouth daily before breakfast 90 tablet 1    polyethylene glycol (GOLYTELY) 4000 mL solution Take 4,000 mL by mouth once for 1 dose 4000 mL 0    sucralfate (CARAFATE) 1 g tablet Take 1 tablet (1 g total) by mouth 4 (four) times a day (Patient not taking: Reported on 5/28/2024) 40 tablet 0    venlafaxine (EFFEXOR-XR) 150 mg 24 hr capsule Take 1 capsule (150 mg total) by mouth daily (Patient not taking: Reported on 5/28/2024) 90 capsule 0     Current Facility-Administered Medications on File Prior to Visit   Medication Dose  Route Frequency Provider Last Rate Last Admin    levonorgestrel (DIOGENES) 13.5 mg intrauterine device (IUD)  1 each Intrauterine Device Once every 3 years    1 Intra Uterine Device at 03/26/24 1242         HPI ROS Appetite Changes and Sleep:     She reports difficulty falling asleep, frequent awakenings, adequate appetite, low energy. Denies homicidal ideation, denies suicidal ideation    Review Of Systems:      HPI ROS:               Medication Side Effects:  denies    Depression (10 worst): 4/10    Anxiety (10 worst): 2-4/10    Safety concerns (SI, HI, etc): Denies si and hi    Sleep: 6 hrs    Energy: Fair to low    Appetite: 2-3 meals    Weight Change: denies        Mental Status Evaluation:    Appearance Adequate hygiene and grooming   Behavior calm and cooperative   Mood anxious  Depression Scale - 4 of 10 (0 = No depression)  Anxiety Scale - 3 of 10 (0 = No anxiety)   Speech Normal rate and volume   Affect mood-congruent   Thought Processes Goal directed and coherent   Thought Content Does not verbalize delusional material   Associations Tightly connected   Perceptual Disturbances Denies hallucinations and does not appear to be responding to internal stimuli   Risk Potential Suicidal/Homicidal Ideation - No evidence of suicidal or homicidal ideation and patient does not verbalize suicidal or homicidal ideation  Risk of Violence - No evidence of risk for violence found on assessment  Risk of Self Mutilation - No evidence of risk for self mutilation found on assessment   Orientation oriented to person, place, time/date and situation   Memory recent and remote memory grossly intact   Consciousness alert and awake   Attention/Concentration attention span and concentration appear shorter than expected for age   Insight intact   Judgement intact   Muscle Strength and Gait normal muscle strength and normal muscle tone, normal gait/station and normal balance   Motor Activity no abnormal movements   Language no  difficulty naming common objects, no difficulty repeating a phrase, no difficulty writing a sentence   Fund of Knowledge adequate knowledge of current events  adequate fund of knowledge regarding past history  adequate fund of knowledge regarding vocabulary      Traumatic History Update:     New Onset of Abuse Since Last Encounter:   no  Traumatic Events Since Last Encounter:   no    Past Medical History:    Past Medical History:   Diagnosis Date    Depression     Psychiatric disorder     Schizophrenia (HCC)         No past surgical history on file.  Allergies   Allergen Reactions    Zolpidem Hallucinations and Other (See Comments)     Other reaction(s): Hallucinations     Substance Abuse History:    Social History     Substance and Sexual Activity   Alcohol Use Yes    Comment: rarely     Social History     Substance and Sexual Activity   Drug Use Yes    Types: Marijuana     Social History:    Social History     Socioeconomic History    Marital status: Significant Other     Spouse name: Not on file    Number of children: Not on file    Years of education: Not on file    Highest education level: Not on file   Occupational History    Not on file   Tobacco Use    Smoking status: Never    Smokeless tobacco: Never   Vaping Use    Vaping status: Never Used   Substance and Sexual Activity    Alcohol use: Yes     Comment: rarely    Drug use: Yes     Types: Marijuana    Sexual activity: Yes     Partners: Female   Other Topics Concern    Not on file   Social History Narrative    Not on file     Social Determinants of Health     Financial Resource Strain: Not on file   Food Insecurity: Not on file   Transportation Needs: Not on file   Physical Activity: Not on file   Stress: Not on file   Social Connections: Not on file   Intimate Partner Violence: Not on file   Housing Stability: Not on file     Family Psychiatric History:     Family History   Problem Relation Age of Onset    Leukemia Mother      History Review:The following  portions of the patient's history were reviewed and updated as appropriate: allergies, current medications, past family history, past medical history, past social history, past surgical history and problem list     OBJECTIVE:     Vital signs in last 24 hours:    There were no vitals filed for this visit.  Laboratory Results:   Recent Labs (last 2 months):   No visits with results within 2 Month(s) from this visit.   Latest known visit with results is:   Procedure visit on 03/26/2024   Component Date Value    URINE HCG 03/26/2024 neg      I have personally reviewed all pertinent laboratory/tests results.    Suicide/Homicide Risk Assessment:    Risk of Harm to Self:  Protective Factors: no current suicidal ideation, access to mental health treatment, compliant with medications, compliant with mental health treatment, good self-esteem, having a desire to be alive, having a desire to live, having a sense of purpose or meaning in life  Based on today's assessment, Laurence presents the following risk of harm to self: minimal    Risk of Harm to Others:  Based on today's assessment, Laurence presents the following risk of harm to others: minimal    The following interventions are recommended: referral for psychotherapy    Medications Risks/Benefits:      Risks, Benefits And Possible Side Effects Of Medications:    Discussed risks and benefits of treatment with patient including risk of suicidality, serotonin syndrome, increased QTc interval and SIADH related to treatment with antidepressants; Risk of induction of manic symptoms in certain patient populations and risk of parkinsonian symptoms, metabolic syndrome, tardive dyskinesia and neuroleptic malignant syndrome related to treatment with antipsychotic medications     Controlled Medication Discussion:     Not applicable    Treatment Plan:    Due for update/Updated:   no  Last treatment plan done 3/12, due 9/12/24    SERVANDO Peralta 06/04/24    Visit Time    Visit Start  Time: 1125  Visit Stop Time: 1150  Total Visit Duration:  25 minutes

## 2024-07-15 ENCOUNTER — ANESTHESIA (OUTPATIENT)
Dept: ANESTHESIOLOGY | Facility: HOSPITAL | Age: 24
End: 2024-07-15

## 2024-07-15 ENCOUNTER — ANESTHESIA EVENT (OUTPATIENT)
Dept: ANESTHESIOLOGY | Facility: HOSPITAL | Age: 24
End: 2024-07-15

## 2024-07-29 ENCOUNTER — ANESTHESIA (OUTPATIENT)
Dept: GASTROENTEROLOGY | Facility: AMBULARY SURGERY CENTER | Age: 24
End: 2024-07-29

## 2024-07-29 ENCOUNTER — HOSPITAL ENCOUNTER (OUTPATIENT)
Dept: GASTROENTEROLOGY | Facility: AMBULARY SURGERY CENTER | Age: 24
Setting detail: OUTPATIENT SURGERY
Discharge: HOME | End: 2024-07-29
Payer: COMMERCIAL

## 2024-07-29 ENCOUNTER — ANESTHESIA EVENT (OUTPATIENT)
Dept: GASTROENTEROLOGY | Facility: AMBULARY SURGERY CENTER | Age: 24
End: 2024-07-29

## 2024-07-29 VITALS
RESPIRATION RATE: 18 BRPM | SYSTOLIC BLOOD PRESSURE: 130 MMHG | BODY MASS INDEX: 38.8 KG/M2 | WEIGHT: 262 LBS | TEMPERATURE: 98.1 F | HEART RATE: 84 BPM | HEIGHT: 69 IN | DIASTOLIC BLOOD PRESSURE: 83 MMHG | OXYGEN SATURATION: 99 %

## 2024-07-29 DIAGNOSIS — R19.8 IRREGULAR BOWEL HABITS: ICD-10-CM

## 2024-07-29 DIAGNOSIS — K21.9 GASTROESOPHAGEAL REFLUX DISEASE, UNSPECIFIED WHETHER ESOPHAGITIS PRESENT: ICD-10-CM

## 2024-07-29 DIAGNOSIS — K59.00 CONSTIPATION, UNSPECIFIED CONSTIPATION TYPE: ICD-10-CM

## 2024-07-29 LAB
EXT PREGNANCY TEST URINE: NEGATIVE
EXT. CONTROL: NORMAL

## 2024-07-29 PROCEDURE — 88305 TISSUE EXAM BY PATHOLOGIST: CPT | Performed by: PATHOLOGY

## 2024-07-29 PROCEDURE — 43239 EGD BIOPSY SINGLE/MULTIPLE: CPT | Performed by: INTERNAL MEDICINE

## 2024-07-29 PROCEDURE — 45385 COLONOSCOPY W/LESION REMOVAL: CPT | Performed by: INTERNAL MEDICINE

## 2024-07-29 PROCEDURE — 81025 URINE PREGNANCY TEST: CPT | Performed by: INTERNAL MEDICINE

## 2024-07-29 RX ORDER — PROPOFOL 10 MG/ML
INJECTION, EMULSION INTRAVENOUS AS NEEDED
Status: DISCONTINUED | OUTPATIENT
Start: 2024-07-29 | End: 2024-07-29

## 2024-07-29 RX ORDER — SODIUM CHLORIDE, SODIUM LACTATE, POTASSIUM CHLORIDE, CALCIUM CHLORIDE 600; 310; 30; 20 MG/100ML; MG/100ML; MG/100ML; MG/100ML
INJECTION, SOLUTION INTRAVENOUS CONTINUOUS PRN
Status: DISCONTINUED | OUTPATIENT
Start: 2024-07-29 | End: 2024-07-29

## 2024-07-29 RX ORDER — LIDOCAINE HYDROCHLORIDE 20 MG/ML
INJECTION, SOLUTION EPIDURAL; INFILTRATION; INTRACAUDAL; PERINEURAL AS NEEDED
Status: DISCONTINUED | OUTPATIENT
Start: 2024-07-29 | End: 2024-07-29

## 2024-07-29 RX ADMIN — PROPOFOL 50 MG: 10 INJECTION, EMULSION INTRAVENOUS at 13:18

## 2024-07-29 RX ADMIN — PROPOFOL 50 MG: 10 INJECTION, EMULSION INTRAVENOUS at 13:07

## 2024-07-29 RX ADMIN — PROPOFOL 50 MG: 10 INJECTION, EMULSION INTRAVENOUS at 13:16

## 2024-07-29 RX ADMIN — PROPOFOL 50 MG: 10 INJECTION, EMULSION INTRAVENOUS at 13:26

## 2024-07-29 RX ADMIN — PROPOFOL 140 MG: 10 INJECTION, EMULSION INTRAVENOUS at 13:01

## 2024-07-29 RX ADMIN — PROPOFOL 50 MG: 10 INJECTION, EMULSION INTRAVENOUS at 13:10

## 2024-07-29 RX ADMIN — PROPOFOL 50 MG: 10 INJECTION, EMULSION INTRAVENOUS at 13:09

## 2024-07-29 RX ADMIN — PROPOFOL 50 MG: 10 INJECTION, EMULSION INTRAVENOUS at 13:04

## 2024-07-29 RX ADMIN — PROPOFOL 50 MG: 10 INJECTION, EMULSION INTRAVENOUS at 13:03

## 2024-07-29 RX ADMIN — PROPOFOL 60 MG: 10 INJECTION, EMULSION INTRAVENOUS at 13:02

## 2024-07-29 RX ADMIN — PROPOFOL 50 MG: 10 INJECTION, EMULSION INTRAVENOUS at 13:05

## 2024-07-29 RX ADMIN — LIDOCAINE HYDROCHLORIDE 100 MG: 20 INJECTION, SOLUTION EPIDURAL; INFILTRATION; INTRACAUDAL at 13:00

## 2024-07-29 RX ADMIN — PROPOFOL 50 MG: 10 INJECTION, EMULSION INTRAVENOUS at 13:20

## 2024-07-29 RX ADMIN — PROPOFOL 50 MG: 10 INJECTION, EMULSION INTRAVENOUS at 13:06

## 2024-07-29 RX ADMIN — SODIUM CHLORIDE, SODIUM LACTATE, POTASSIUM CHLORIDE, AND CALCIUM CHLORIDE: .6; .31; .03; .02 INJECTION, SOLUTION INTRAVENOUS at 12:49

## 2024-07-29 RX ADMIN — PROPOFOL 50 MG: 10 INJECTION, EMULSION INTRAVENOUS at 13:08

## 2024-07-29 NOTE — ANESTHESIA POSTPROCEDURE EVALUATION
Post-Op Assessment Note    CV Status:  Stable    Pain management: satisfactory to patient       Mental Status:  Awake, sleepy and arousable   Hydration Status:  Euvolemic   PONV Controlled:  Controlled   Airway Patency:  Patent     Post Op Vitals Reviewed: Yes    No anethesia notable event occurred.    Staff: CRNA, Anesthesiologist               /81 (07/29/24 1333)    Temp 98.1 °F (36.7 °C) (07/29/24 1333)    Pulse 88 (07/29/24 1333)   Resp 18 (07/29/24 1333)    SpO2 94 % (07/29/24 1333)

## 2024-07-29 NOTE — ANESTHESIA PREPROCEDURE EVALUATION
Procedure:  EGD  COLONOSCOPY    Relevant Problems   GI/HEPATIC   (+) Gastroesophageal reflux disease      HEMATOLOGY   (+) Anemia      MUSCULOSKELETAL   (+) Mid back pain      NEURO/PSYCH   (+) Anxiety state   (+) Chronic right shoulder pain   (+) Recurrent depression (HCC)      PULMONARY   (+) Mild intermittent asthma without complication        Physical Exam    Airway    Mallampati score: II  TM Distance: >3 FB  Neck ROM: full     Dental   No notable dental hx     Cardiovascular  Cardiovascular exam normal    Pulmonary  Pulmonary exam normal     Other Findings  post-pubertal.      Anesthesia Plan  ASA Score- 2     Anesthesia Type- IV sedation with anesthesia with ASA Monitors.         Additional Monitors:     Airway Plan:            Plan Factors-Exercise tolerance (METS): >4 METS.    Chart reviewed.   Existing labs reviewed. Patient summary reviewed.    Patient is not a current smoker.      Obstructive sleep apnea risk education given perioperatively.        Induction-     Postoperative Plan-     Perioperative Resuscitation Plan - Level 1 - Full Code.       Informed Consent- Anesthetic plan and risks discussed with patient.  I personally reviewed this patient with the CRNA. Discussed and agreed on the Anesthesia Plan with the CRNA..

## 2024-07-29 NOTE — H&P
"History and Physical -  [facility]  Laurence Kim 23 y.o. female MRN: [id number]                  HPI: Laurence Kim is a 23 y.o. year old female who presents for colonoscopy/EGD      REVIEW OF SYSTEMS: Per the HPI, and otherwise unremarkable.    Historical Information   Past Medical History:   Diagnosis Date    Depression     Psychiatric disorder     Schizophrenia (HCC)      History reviewed. No pertinent surgical history.  Social History   Social History     Substance and Sexual Activity   Alcohol Use Yes    Comment: rarely     Social History     Substance and Sexual Activity   Drug Use Yes    Types: Marijuana     Social History     Tobacco Use   Smoking Status Never   Smokeless Tobacco Never     Family History   Problem Relation Age of Onset    Leukemia Mother        Meds/Allergies       Current Outpatient Medications:     lurasidone (Latuda) 40 mg tablet    pantoprazole (PROTONIX) 20 mg tablet    venlafaxine (EFFEXOR-XR) 150 mg 24 hr capsule    venlafaxine (EFFEXOR-XR) 37.5 mg 24 hr capsule    albuterol (PROVENTIL HFA,VENTOLIN HFA) 90 mcg/act inhaler    atoMOXetine (STRATTERA) 80 MG capsule    polyethylene glycol (GOLYTELY) 4000 mL solution    sucralfate (CARAFATE) 1 g tablet    Current Facility-Administered Medications:     levonorgestrel (DIOGENES) 13.5 mg intrauterine device (IUD), 1 each, Intrauterine Device, Once every 3 years, 1 Intra Uterine Device at 03/26/24 1242    Allergies   Allergen Reactions    Zolpidem Hallucinations and Other (See Comments)     Other reaction(s): Hallucinations       Objective     /81   Pulse 89   Temp (!) 96.7 °F (35.9 °C) (Temporal)   Resp 18   Ht 5' 9\" (1.753 m)   Wt 119 kg (262 lb)   SpO2 96%   BMI 38.69 kg/m²       PHYSICAL EXAM    Gen: NAD  Head: NCAT  CV: RRR  CHEST: Clear  ABD: soft, NT/ND  EXT: no edema      ASSESSMENT/PLAN:  This is a 23 y.o. year old female here for EGD/colonoscopy, and she is stable and optimized for her procedure.     "

## 2024-07-30 PROBLEM — Z86.0100 HISTORY OF COLON POLYPS: Status: ACTIVE | Noted: 2024-07-30

## 2024-07-30 PROBLEM — Z86.010 HISTORY OF COLON POLYPS: Status: ACTIVE | Noted: 2024-07-30

## 2024-07-31 PROCEDURE — 88305 TISSUE EXAM BY PATHOLOGIST: CPT | Performed by: PATHOLOGY

## 2024-08-26 ENCOUNTER — TELEPHONE (OUTPATIENT)
Dept: GASTROENTEROLOGY | Facility: CLINIC | Age: 24
End: 2024-08-26

## 2024-09-04 DIAGNOSIS — R41.840 POOR CONCENTRATION: ICD-10-CM

## 2024-09-04 DIAGNOSIS — F31.32 BIPOLAR 1 DISORDER, DEPRESSED, MODERATE (HCC): ICD-10-CM

## 2024-09-04 DIAGNOSIS — F41.1 GENERALIZED ANXIETY DISORDER: ICD-10-CM

## 2024-09-04 RX ORDER — VENLAFAXINE HYDROCHLORIDE 37.5 MG/1
37.5 CAPSULE, EXTENDED RELEASE ORAL DAILY
Qty: 90 CAPSULE | Refills: 0 | Status: SHIPPED | OUTPATIENT
Start: 2024-09-04 | End: 2024-09-10 | Stop reason: SDUPTHER

## 2024-09-04 RX ORDER — LURASIDONE HYDROCHLORIDE 40 MG/1
40 TABLET, FILM COATED ORAL
Qty: 90 TABLET | Refills: 0 | Status: SHIPPED | OUTPATIENT
Start: 2024-09-04 | End: 2024-09-10 | Stop reason: SDUPTHER

## 2024-09-04 RX ORDER — ATOMOXETINE 80 MG/1
80 CAPSULE ORAL DAILY
Qty: 90 CAPSULE | Refills: 0 | Status: SHIPPED | OUTPATIENT
Start: 2024-09-04 | End: 2024-09-10 | Stop reason: SDUPTHER

## 2024-09-10 ENCOUNTER — OFFICE VISIT (OUTPATIENT)
Dept: PSYCHIATRY | Facility: CLINIC | Age: 24
End: 2024-09-10
Payer: COMMERCIAL

## 2024-09-10 VITALS
HEART RATE: 84 BPM | WEIGHT: 265 LBS | DIASTOLIC BLOOD PRESSURE: 85 MMHG | BODY MASS INDEX: 39.13 KG/M2 | SYSTOLIC BLOOD PRESSURE: 134 MMHG

## 2024-09-10 DIAGNOSIS — F31.32 BIPOLAR 1 DISORDER, DEPRESSED, MODERATE (HCC): Primary | ICD-10-CM

## 2024-09-10 DIAGNOSIS — R41.840 POOR CONCENTRATION: ICD-10-CM

## 2024-09-10 DIAGNOSIS — F41.1 GENERALIZED ANXIETY DISORDER: ICD-10-CM

## 2024-09-10 PROCEDURE — 99214 OFFICE O/P EST MOD 30 MIN: CPT

## 2024-09-10 RX ORDER — TRAZODONE HYDROCHLORIDE 50 MG/1
50 TABLET, FILM COATED ORAL
Qty: 90 TABLET | Refills: 0 | Status: SHIPPED | OUTPATIENT
Start: 2024-09-10

## 2024-09-10 RX ORDER — VENLAFAXINE HYDROCHLORIDE 37.5 MG/1
37.5 CAPSULE, EXTENDED RELEASE ORAL DAILY
Qty: 90 CAPSULE | Refills: 0 | Status: SHIPPED | OUTPATIENT
Start: 2024-09-10

## 2024-09-10 RX ORDER — VENLAFAXINE HYDROCHLORIDE 150 MG/1
150 CAPSULE, EXTENDED RELEASE ORAL DAILY
Qty: 90 CAPSULE | Refills: 0 | Status: SHIPPED | OUTPATIENT
Start: 2024-09-10

## 2024-09-10 RX ORDER — ATOMOXETINE 80 MG/1
80 CAPSULE ORAL DAILY
Qty: 90 CAPSULE | Refills: 0 | Status: SHIPPED | OUTPATIENT
Start: 2024-09-10

## 2024-09-10 RX ORDER — LURASIDONE HYDROCHLORIDE 40 MG/1
40 TABLET, FILM COATED ORAL
Qty: 90 TABLET | Refills: 0 | Status: SHIPPED | OUTPATIENT
Start: 2024-09-10

## 2024-09-10 NOTE — BH TREATMENT PLAN
TREATMENT PLAN (Medication Management Only)        [facility]    Name and Date of Birth:  Laurence Kim 23 y.o. 2000  Date of Treatment Plan: September 10, 2024  Diagnosis/Diagnoses:    1. Bipolar 1 disorder, depressed, moderate (HCC)    2. Generalized anxiety disorder    3. Poor concentration      Strengths/Personal Resources for Self-Care: supportive family, supportive friends, taking medications as prescribed, ability to adapt to life changes, ability to communicate needs, ability to communicate well, ability to understand psychiatric illness, independence.  Area/Areas of need (in own words): anxiety symptoms, depressive symptoms, mood instability, ADHD symptoms  1. Long Term Goal: continue improvement in mood stability.  Target Date:6 months - 3/10/2025  Person/Persons responsible for completion of goal: Laurence  2.  Short Term Objective (s) - How will we reach this goal?:   A. Provider new recommended medication/dosage changes and/or continue medication(s): continue current medications as prescribed.  B. Attend medication management appointments regularly.  C. Take psychiatric medications responsibly.  Target Date:6 months - 3/10/2025  Person/Persons Responsible for Completion of Goal: Laurence and amita ojeda  Progress Towards Goals: continuing treatment  Treatment Modality: medication management every 2 months  Review due 180 days from date of this plan: 6 months - 3/10/2025  Expected length of service: maintenance  My Physician/PA/NP and I have developed this plan together and I agree to work on the goals and objectives. I understand the treatment goals that were developed for my treatment.

## 2024-09-10 NOTE — PSYCH
Regular Visit    Problem List Items Addressed This Visit    None  Visit Diagnoses       Bipolar 1 disorder, depressed, moderate (HCC)    -  Primary    Generalized anxiety disorder        Poor concentration                   Encounter provider SERVANDO Peralta    Provider located at    [place] [address] [facility] [address] [phone]    Recent Visits  No visits were found meeting these conditions.  Showing recent visits within past 7 days and meeting all other requirements  Today's Visits  Date Type Provider Dept   09/10/24 Office Visit SERVANDO Peralta  [facility]   Showing today's visits and meeting all other requirements  Future Appointments  No visits were found meeting these conditions.  Showing future appointments within next 150 days and meeting all other requirements       HPI     Current Outpatient Medications   Medication Sig Dispense Refill    albuterol (PROVENTIL HFA,VENTOLIN HFA) 90 mcg/act inhaler 2 puff(s)      atomoxetine (STRATTERA) 80 MG capsule take 1 capsule by mouth daily 90 capsule 0    lurasidone (LATUDA) 40 mg tablet take 1 tablet by mouth daily with dinner 90 tablet 0    pantoprazole (PROTONIX) 20 mg tablet Take 1 tablet (20 mg total) by mouth daily before breakfast 90 tablet 1    polyethylene glycol (GOLYTELY) 4000 mL solution Take 4,000 mL by mouth once for 1 dose 4000 mL 0    sucralfate (CARAFATE) 1 g tablet Take 1 tablet (1 g total) by mouth 4 (four) times a day (Patient not taking: Reported on 5/28/2024) 40 tablet 0    venlafaxine (EFFEXOR-XR) 150 mg 24 hr capsule Take 1 capsule (150 mg total) by mouth daily 90 capsule 0    venlafaxine (EFFEXOR-XR) 37.5 mg 24 hr capsule take 1 capsule by mouth once daily 90 capsule 0     Current Facility-Administered Medications   Medication Dose Route Frequency Provider Last Rate Last Admin    levonorgestrel (DIOGENES) 13.5 mg intrauterine device  (IUD)  1 each Intrauterine Device Once every 3 years    1 Intra Uterine Device at 03/26/24 1242       Review of Systems        MEDICATION MANAGEMENT NOTE        [facility]    Name and Date of Birth:  Laurence Kim 23 y.o. 2000 MRN: [id number]    Date of Visit: September 10, 2024    Allergies   Allergen Reactions    Zolpidem Hallucinations and Other (See Comments)     Other reaction(s): Hallucinations         Assessment & Plan  Bipolar 1 disorder, depressed, moderate (HCC)  Continue current Latuda 40 mg daily, stable with no manic s/s  Generalized anxiety disorder  Relatively stable with current Effexor 187.5 mg daily, no recent panic attacks.  Poor concentration  Improved with current Strattera 80 mg daily          SUBJECTIVE:    Laurence is seen today for a follow up for Bipolar Disorder, Generalized Anxiety Disorder and insomnia. She continues to do fairly well since the last visit.  Patient reports continued stabilization in terms of bipolar disorder, anxiety on her current medication and experiencing no side effects.  She cannot recall any recent mood elevation symptoms-denies grandiosity, hypertalkativeness, risky behaviors, severe energy fluctuations.  She does admit to chronic poor sleep roughly 5 to 6 hours nightly with periods of awakening.  Shares she was on trazodone in the past which improved overall sleep, will initiate trazodone 50 mg daily today, side effects explained, patient verbalized understanding and is optimistic regarding medication initiation.  Mood appears at baseline, controlled, euthymic.  Reports 4/10 severity of depression with occasional feelings of dysphoria, low energy-states symptoms were moderate in the summer as her friend passed away a few summers ago and summers are difficult now.  Patient continues to report improved concentration and memory with Strattera 80 mg daily, tolerated with no adverse effects.  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  She recently initiated private psychotherapy to aid with emotional regulation, coping skills and is finding significant benefit from therapy.  Reports adequate appetite, she continues her weight loss journey and currently weighs 165 pounds.  She has included healthier diet and more regular exercise in her routines.  Has yet to complete pending antipsychotic blood work, encouraged patient to do so soon as possible.  Denies SI.      She denies any side effects from medications.    PLAN:    -Continue Latuda to 40mg. Denies all side effects, takes with food. ----Complete pending antipsychotic blood work from last year   Patient education completed for Latuda, risks include:  NMS, leukopenia, neutropenia, tardive dyskinesia, angioedema, suicidality, diabetes, orthostatic hypotension, somnolence or insomnia, GI upset, hyperprolactinemia, akathisia, and possible weight gain (less than other SGA's), etc.     -Continue Effexor 187.5mg daily for improved anxiety control, denies all side effects bp 134/85  PARQ completed including serotonin syndrome, SIADH, worsening depression, suicidality, induction of orville, GI upset, headaches, activation, sexual side effects, sedation, potential drug interactions, and others.     -Continue Strattera to 80 mg for poor concentration and attention symptoms     -Initiate Trazodone 50mg hs prn for sleep   PARQ completed including dizziness, headache, GI distress, sedation, confusion, priapism, suicidal thoughts, serotonin syndrome, drug interactions, induction of orville and others.       Aware of 24 hour and weekend coverage for urgent situations accessed by calling [facility] main practice number  Referral for individual psychotherapy    Diagnoses and all orders for this visit:    Bipolar 1 disorder, depressed, moderate (HCC)    Generalized anxiety disorder    Poor concentration            Current Outpatient  Medications on File Prior to Visit   Medication Sig Dispense Refill    albuterol (PROVENTIL HFA,VENTOLIN HFA) 90 mcg/act inhaler 2 puff(s)      atomoxetine (STRATTERA) 80 MG capsule take 1 capsule by mouth daily 90 capsule 0    lurasidone (LATUDA) 40 mg tablet take 1 tablet by mouth daily with dinner 90 tablet 0    pantoprazole (PROTONIX) 20 mg tablet Take 1 tablet (20 mg total) by mouth daily before breakfast 90 tablet 1    polyethylene glycol (GOLYTELY) 4000 mL solution Take 4,000 mL by mouth once for 1 dose 4000 mL 0    sucralfate (CARAFATE) 1 g tablet Take 1 tablet (1 g total) by mouth 4 (four) times a day (Patient not taking: Reported on 5/28/2024) 40 tablet 0    venlafaxine (EFFEXOR-XR) 150 mg 24 hr capsule Take 1 capsule (150 mg total) by mouth daily 90 capsule 0    venlafaxine (EFFEXOR-XR) 37.5 mg 24 hr capsule take 1 capsule by mouth once daily 90 capsule 0     Current Facility-Administered Medications on File Prior to Visit   Medication Dose Route Frequency Provider Last Rate Last Admin    levonorgestrel (DIOGENES) 13.5 mg intrauterine device (IUD)  1 each Intrauterine Device Once every 3 years    1 Intra Uterine Device at 03/26/24 1242         HPI ROS Appetite Changes and Sleep:     She reports difficulty falling asleep, frequent awakenings, adequate appetite, low energy. Denies homicidal ideation, denies suicidal ideation    Review Of Systems:      HPI ROS:               Medication Side Effects:  denies    Depression (10 worst): 4/10    Anxiety (10 worst): 4/10    Safety concerns (SI, HI, etc): Denies si and hi    Sleep: Poor 5 hrs    Energy: Fair to low    Appetite: 2-3 meals    Weight Change: denies        Mental Status Evaluation:    Appearance Adequate hygiene and grooming   Behavior calm and cooperative   Mood anxious  Depression Scale - 4 of 10 (0 = No depression)  Anxiety Scale - 4 of 10 (0 = No anxiety)   Speech Normal rate and volume   Affect mood-congruent   Thought Processes Goal directed and  coherent   Thought Content Does not verbalize delusional material   Associations Tightly connected   Perceptual Disturbances Denies hallucinations and does not appear to be responding to internal stimuli   Risk Potential Suicidal/Homicidal Ideation - No evidence of suicidal or homicidal ideation and patient does not verbalize suicidal or homicidal ideation  Risk of Violence - No evidence of risk for violence found on assessment  Risk of Self Mutilation - No evidence of risk for self mutilation found on assessment   Orientation oriented to person, place, time/date and situation   Memory recent and remote memory grossly intact   Consciousness alert and awake   Attention/Concentration attention span and concentration appear shorter than expected for age   Insight intact   Judgement intact   Muscle Strength and Gait normal muscle strength and normal muscle tone, normal gait/station and normal balance   Motor Activity no abnormal movements   Language no difficulty naming common objects, no difficulty repeating a phrase, no difficulty writing a sentence   Fund of Knowledge adequate knowledge of current events  adequate fund of knowledge regarding past history  adequate fund of knowledge regarding vocabulary      Traumatic History Update:     New Onset of Abuse Since Last Encounter:   no  Traumatic Events Since Last Encounter:   no    Past Medical History:    Past Medical History:   Diagnosis Date    Depression     Psychiatric disorder     Schizophrenia (HCC)         No past surgical history on file.  Allergies   Allergen Reactions    Zolpidem Hallucinations and Other (See Comments)     Other reaction(s): Hallucinations     Substance Abuse History:    Social History     Substance and Sexual Activity   Alcohol Use Yes    Comment: rarely     Social History     Substance and Sexual Activity   Drug Use Yes    Types: Marijuana     Social History:    Social History     Socioeconomic History    Marital status: Single     Spouse  name: Not on file    Number of children: Not on file    Years of education: Not on file    Highest education level: Not on file   Occupational History    Not on file   Tobacco Use    Smoking status: Never    Smokeless tobacco: Never   Vaping Use    Vaping status: Never Used   Substance and Sexual Activity    Alcohol use: Yes     Comment: rarely    Drug use: Yes     Types: Marijuana    Sexual activity: Yes     Partners: Female   Other Topics Concern    Not on file   Social History Narrative    Not on file     Social Determinants of Health     Financial Resource Strain: Not on file   Food Insecurity: Not on file   Transportation Needs: Not on file   Physical Activity: Not on file   Stress: Not on file   Social Connections: Unknown (6/18/2024)    Received from [facility]     How often do you feel lonely or isolated from those around you? (Adult - for ages 18 years and over): Not on file   Intimate Partner Violence: Not on file   Housing Stability: Not on file     Family Psychiatric History:     Family History   Problem Relation Age of Onset    Leukemia Mother      History Review:The following portions of the patient's history were reviewed and updated as appropriate: allergies, current medications, past family history, past medical history, past social history, past surgical history and problem list     OBJECTIVE:     Vital signs in last 24 hours:    There were no vitals filed for this visit.  Laboratory Results:   Recent Labs (last 2 months):   Hospital Outpatient Visit on 07/29/2024   Component Date Value    EXT Preg Test, Ur 07/29/2024 Negative     Control 07/29/2024 Valid     Case Report 07/29/2024                      Value:Surgical Pathology Report                         Case: [id number]                                  Authorizing Provider:  Coby Fair DO    Collected:           07/29/2024 1306              Ordering Location:     [facility]        Received:             07/29/2024 1834                                     [facility]                                                    Pathologist:           Damir Suggs,                                                             Specimens:   A) - Stomach, Gastric bx                                                                            B) - Esophagus, Distal esophagus bx                                                                 C) - Esophagus, Proximal esophageal bx                                                              D) - Polyp, Colorectal, Rectal polyp cold snare                                            Final Diagnosis 07/29/2024                      Value:A. Stomach, biopsy:  -Gastric antral and oxyntic mucosa with mild chronic inactive gastritis.  -Negative for Helicobacter pylori organisms on H&E stain.     B. Esophagus, distal, biopsy:  -Esophageal squamous mucosa with no significant histopathologic change.  -Separate fragment of gastric oxyntic mucosa with no significant histopathologic change.   -Negative for increased intraepithelial eosinophils.  -Negative for intestinal metaplasia.     C. Esophagus, proximal, biopsy:  -Esophageal squamous mucosa with no significant histopathologic change.  -Negative for increased intraepithelial eosinophils.    D. Rectum, polyp, polypectomy:  -Polypoid rectal mucosa with no significant histopathologic change.        Additional Information 07/29/2024                      Value:All reported additional testing was performed with appropriately reactive controls.  These tests were developed and their performance characteristics determined by [facility] or appropriate performing facility, though some tests may be performed on tissues which have not been validated for performance characteristics (such as staining performed on alcohol exposed cell blocks and decalcified tissues).  Results should be interpreted with caution and in the  "context of the patients’ clinical condition. These tests may not be cleared or approved by the U.S. Food and Drug Administration, though the FDA has determined that such clearance or approval is not necessary. These tests are used for clinical purposes and they should not be regarded as investigational or for research. This laboratory has been approved by CLIA 88, designated as a high-complexity laboratory and is qualified to perform these tests.    Interpretation performed at [facility] [address] [place], [place] [address]Jia [place] [address]      Gross Description 07/29/2024                      Value:A. The specimen is received in formalin, labeled with the patient's name and hospital number, and is designated \" gastric biopsy\".  The specimen consists of multiple tan soft tissue fragments measuring in loose aggregate 1.0 x 0.3 x 0.2 cm.  Entirely submitted. One screened cassette.  B. The specimen is received in formalin, labeled with the patient's name and hospital number, and is designated \" distal esophagus biopsy\".  The specimen consists of 4 tan soft tissue fragments ranging from 0.1 to 0.3 cm in greatest dimension.  Entirely submitted. One screened cassette.  C. The specimen is received in formalin, labeled with the patient's name and hospital number, and is designated \" proximal esophagus biopsy\".  The specimen consists of 2 tan soft tissue fragments measuring 0.2 and 0.4 cm in greatest dimension.  Entirely submitted. One screened cassette.  D. The specimen is received in formalin, labeled with the patient's name and hospital number, and is designated \" rectal polyp\".  The                           specimen consists of 4 tan-white soft tissue fragments ranging from less than 0.1 to 0.2 cm in greatest dimension.  Due to the size and consistency of the specimen it is questionable whether it will survive processing.  Entirely submitted. One screened cassette.    Note: The estimated total " formalin fixation time based upon information provided by the submitting clinician and the standard processing schedule is under 72 hours. [facility]        Clinical Information 07/29/2024                      Value:R/o H pylori     I have personally reviewed all pertinent laboratory/tests results.    Suicide/Homicide Risk Assessment:    Risk of Harm to Self:  Protective Factors: no current suicidal ideation, access to mental health treatment, compliant with medications, compliant with mental health treatment, good self-esteem, having a desire to be alive, having a desire to live, having a sense of purpose or meaning in life  Based on today's assessment, Laurence presents the following risk of harm to self: minimal    Risk of Harm to Others:  Based on today's assessment, Laurence presents the following risk of harm to others: minimal    The following interventions are recommended: referral for psychotherapy    Medications Risks/Benefits:      Risks, Benefits And Possible Side Effects Of Medications:    Discussed risks and benefits of treatment with patient including risk of suicidality, serotonin syndrome, increased QTc interval and SIADH related to treatment with antidepressants; Risk of induction of manic symptoms in certain patient populations and risk of parkinsonian symptoms, metabolic syndrome, tardive dyskinesia and neuroleptic malignant syndrome related to treatment with antipsychotic medications     Controlled Medication Discussion:     Not applicable    Treatment Plan:    Due for update/Updated:   no  Last treatment plan done 3/12, due 9/12/24    SERVANDO Peralta 09/10/24    Visit Time    Visit Start Time: 1120  Visit Stop Time: 1142  Total Visit Duration:  22 minutes

## 2024-10-02 ENCOUNTER — TELEPHONE (OUTPATIENT)
Dept: PSYCHIATRY | Facility: CLINIC | Age: 24
End: 2024-10-02

## 2024-10-02 NOTE — TELEPHONE ENCOUNTER
MACO for pt canceling her 12/17 at 9am. Her provider will be off campus that day so she will need to be rescheduled, please.

## 2024-10-08 ENCOUNTER — PROCEDURE VISIT (OUTPATIENT)
Dept: OBGYN CLINIC | Facility: CLINIC | Age: 24
End: 2024-10-08
Payer: COMMERCIAL

## 2024-10-08 VITALS
HEIGHT: 69 IN | SYSTOLIC BLOOD PRESSURE: 160 MMHG | BODY MASS INDEX: 39.25 KG/M2 | WEIGHT: 265 LBS | DIASTOLIC BLOOD PRESSURE: 88 MMHG

## 2024-10-08 DIAGNOSIS — Z30.432 ENCOUNTER FOR IUD REMOVAL: Primary | ICD-10-CM

## 2024-10-08 PROCEDURE — 58301 REMOVE INTRAUTERINE DEVICE: CPT

## 2024-10-08 NOTE — PROGRESS NOTES
IUD Procedure    Date/Time: 10/8/2024 8:48 AM    Performed by: SERVANDO Connell  Authorized by: Virginie Post MD    Other Assisting Provider: Yes (comment)    Verbal consent obtained?: Yes    Written consent obtained?: No    Risks and benefits: Risks, benefits and alternatives were discussed    Consent given by:  Patient  Time Out:     Time out: Immediately prior to the procedure a time out was called      Time out performed at:  10/8/2024 8:30 AM  Patient states understanding of procedure being performed: Yes    Patient's understanding of procedure matches consent: Yes    Procedure consent matches procedure scheduled: Yes    Relevant documents present and verified: Yes    Test results available and properly labeled: Yes    Radiology Images displayed and confirmed. If images not available, report reviewed: Yes    Required items: Required blood products, implants, devices and special equipment available    Patient identity confirmed:  Verbally with patient  Select procedure: IUD removal    IUD Removal:     Reason for removal: patient request      Removed without complications: yes      Tenaculum/Allis/Ring Forceps applied to cervix: no      Strings visualized: yes      IUD intact: yes    Removal Comments:      Pt presents for IUD removal. Strings visualized and grasped. Pt was asked to bear down and IUD was removed easily without incidence. IUD intact. Patient is in same-sex relationship and does not desire a different contraceptive method.

## 2024-11-11 ENCOUNTER — OFFICE VISIT (OUTPATIENT)
Dept: URGENT CARE | Facility: CLINIC | Age: 24
End: 2024-11-11
Payer: COMMERCIAL

## 2024-11-11 VITALS
RESPIRATION RATE: 18 BRPM | DIASTOLIC BLOOD PRESSURE: 94 MMHG | TEMPERATURE: 98.2 F | OXYGEN SATURATION: 99 % | HEART RATE: 93 BPM | SYSTOLIC BLOOD PRESSURE: 142 MMHG

## 2024-11-11 DIAGNOSIS — J06.9 UPPER RESPIRATORY TRACT INFECTION, UNSPECIFIED TYPE: Primary | ICD-10-CM

## 2024-11-11 DIAGNOSIS — J02.9 SORE THROAT: ICD-10-CM

## 2024-11-11 LAB
S PYO AG THROAT QL: NEGATIVE
SARS-COV-2 AG UPPER RESP QL IA: NEGATIVE
VALID CONTROL: NORMAL

## 2024-11-11 PROCEDURE — 87811 SARS-COV-2 COVID19 W/OPTIC: CPT

## 2024-11-11 PROCEDURE — 87636 SARSCOV2 & INF A&B AMP PRB: CPT

## 2024-11-11 PROCEDURE — 99213 OFFICE O/P EST LOW 20 MIN: CPT

## 2024-11-11 PROCEDURE — 87070 CULTURE OTHR SPECIMN AEROBIC: CPT

## 2024-11-11 PROCEDURE — 87880 STREP A ASSAY W/OPTIC: CPT

## 2024-11-11 NOTE — PROGRESS NOTES
"  [facility]        NAME: Laurence Kim is a 24 y.o. female  : 2000    MRN: [id number]  DATE: 2024  TIME: 8:52 AM    Assessment and Plan   Upper respiratory tract infection, unspecified type [J06.9]  1. Upper respiratory tract infection, unspecified type  Poct Covid 19 Rapid Antigen Test    Covid/Flu- Office Collect Normal    Covid/Flu- Office Collect Normal      2. Sore throat  POCT rapid ANTIGEN strepA    Throat culture    Throat culture        Rapid Strep and COVID tests negative, will send COVID/flu PCR and throat culture and f/u if positive. Suspect viral illness given clinical presentation. VSS in clinic, appears in no acute distress. Educated on use of OTC products for symptoms. Advised close follow-up with PCP or to report to the ER if symptoms worsen. Patient verbalizes understanding and agreeable to plan. Work note provided.       Patient Instructions     Symptoms of a viral infection may linger for up to 10 days. Your contagious period is the first 5-7 days of symptom onset. Continue over-the-counter products for symptoms: tylenol for fevers, ibuprofen for body aches, flonase (fluticasone) with nasal saline for congestion, mucinex for cough, and airborne/emergen-c for vitamin supplementation. Follow-up with PCP in 3-5 days. Report to ER if symptoms worsen.       Chief Complaint     Chief Complaint   Patient presents with    Sore Throat     Sore throat, body aches, ear pain, congestion since yesterday.          History of Present Illness       24 year old female presents for evaluation of sore throat, cough, and congestion x 2 days. She denies any known direct sick contacts but does work for the general public. She reports h/o asthma and seasonal allergies but hasn't used her inhaler in years; she does have one available at home if needed. She denies any known fevers but reports having chills and \"feeling warm\" overnight. She reports body aches, fatigue, and a dry cough. She " denies productive sputum or SOB. She took tylenol this morning for symptoms.    Sore Throat   This is a new problem. The current episode started in the past 7 days. The pain is moderate. Associated symptoms include congestion, coughing and swollen glands. Pertinent negatives include no abdominal pain, diarrhea, drooling, ear discharge, ear pain, headaches, hoarse voice, plugged ear sensation, neck pain, shortness of breath, stridor, trouble swallowing or vomiting. She has had no exposure to strep or mono. She has tried acetaminophen for the symptoms. The treatment provided mild relief.       Review of Systems   Review of Systems   Constitutional:  Positive for activity change, chills and fatigue. Negative for appetite change and fever.   HENT:  Positive for congestion, postnasal drip, rhinorrhea and sore throat. Negative for drooling, ear discharge, ear pain, hoarse voice, sinus pressure, sinus pain, sneezing and trouble swallowing.    Respiratory:  Positive for cough. Negative for chest tightness, shortness of breath and stridor.    Cardiovascular:  Negative for chest pain and palpitations.   Gastrointestinal:  Negative for abdominal pain, constipation, diarrhea, nausea and vomiting.   Musculoskeletal:  Positive for myalgias. Negative for arthralgias, back pain and neck pain.   Skin:  Negative for color change and pallor.   Allergic/Immunologic: Negative for environmental allergies and food allergies.   Neurological:  Negative for dizziness, light-headedness and headaches.         Current Medications       Current Outpatient Medications:     albuterol (PROVENTIL HFA,VENTOLIN HFA) 90 mcg/act inhaler, 2 puff(s), Disp: , Rfl:     atomoxetine (STRATTERA) 80 MG capsule, Take 1 capsule (80 mg total) by mouth daily, Disp: 90 capsule, Rfl: 0    lurasidone (LATUDA) 40 mg tablet, Take 1 tablet (40 mg total) by mouth daily with dinner, Disp: 90 tablet, Rfl: 0    pantoprazole (PROTONIX) 20 mg tablet, Take 1 tablet (20 mg  total) by mouth daily before breakfast, Disp: 90 tablet, Rfl: 1    traZODone (DESYREL) 50 mg tablet, Take 1 tablet (50 mg total) by mouth daily at bedtime, Disp: 90 tablet, Rfl: 0    venlafaxine (EFFEXOR-XR) 150 mg 24 hr capsule, Take 1 capsule (150 mg total) by mouth daily, Disp: 90 capsule, Rfl: 0    polyethylene glycol (GOLYTELY) 4000 mL solution, Take 4,000 mL by mouth once for 1 dose (Patient not taking: Reported on 11/11/2024), Disp: 4000 mL, Rfl: 0    venlafaxine (EFFEXOR-XR) 37.5 mg 24 hr capsule, Take 1 capsule (37.5 mg total) by mouth daily (Patient not taking: Reported on 11/11/2024), Disp: 90 capsule, Rfl: 0    Current Facility-Administered Medications:     levonorgestrel (DIOGENES) 13.5 mg intrauterine device (IUD), 1 each, Intrauterine Device, Once every 3 years, , 1 Intra Uterine Device at 03/26/24 1242    Current Allergies     Allergies as of 11/11/2024 - Reviewed 11/11/2024   Allergen Reaction Noted    Zolpidem Hallucinations and Other (See Comments)             The following portions of the patient's history were reviewed and updated as appropriate: allergies, current medications, past family history, past medical history, past social history, past surgical history and problem list.     Past Medical History:   Diagnosis Date    Depression     Psychiatric disorder     Schizophrenia (HCC)        History reviewed. No pertinent surgical history.    Family History   Problem Relation Age of Onset    Leukemia Mother          Medications have been verified.        Objective   /94   Pulse 93   Temp 98.2 °F (36.8 °C)   Resp 18   SpO2 99%        Physical Exam     Physical Exam  Vitals and nursing note reviewed.   Constitutional:       General: She is awake. She is not in acute distress.     Appearance: Normal appearance. She is well-developed and normal weight.   HENT:      Head: Normocephalic and atraumatic.      Right Ear: Hearing, tympanic membrane, ear canal and external ear normal.      Left Ear:  Hearing, tympanic membrane, ear canal and external ear normal.      Nose: Congestion and rhinorrhea present. Rhinorrhea is clear.      Right Turbinates: Not enlarged, swollen or pale.      Left Turbinates: Not enlarged, swollen or pale.      Right Sinus: No maxillary sinus tenderness or frontal sinus tenderness.      Left Sinus: No maxillary sinus tenderness or frontal sinus tenderness.      Mouth/Throat:      Lips: Pink.      Mouth: Mucous membranes are moist.      Pharynx: Oropharynx is clear. Uvula midline. Posterior oropharyngeal erythema and postnasal drip present. No pharyngeal swelling, oropharyngeal exudate or uvula swelling.      Tonsils: No tonsillar exudate or tonsillar abscesses.   Eyes:      Conjunctiva/sclera: Conjunctivae normal.      Pupils: Pupils are equal, round, and reactive to light.   Cardiovascular:      Rate and Rhythm: Normal rate and regular rhythm.      Pulses: Normal pulses.      Heart sounds: Normal heart sounds.   Pulmonary:      Effort: Pulmonary effort is normal.      Breath sounds: Normal breath sounds.   Musculoskeletal:      Cervical back: Full passive range of motion without pain, normal range of motion and neck supple.   Lymphadenopathy:      Cervical: No cervical adenopathy.   Skin:     General: Skin is warm and dry.   Neurological:      General: No focal deficit present.      Mental Status: She is alert and oriented to person, place, and time.   Psychiatric:         Mood and Affect: Mood normal.         Behavior: Behavior normal. Behavior is cooperative.         Thought Content: Thought content normal.         Judgment: Judgment normal.

## 2024-11-11 NOTE — LETTER
November 11, 2024     Patient: Laurence Kim   YOB: 2000   Date of Visit: 11/11/2024       To Whom it May Concern:    Laurence Kim was seen in my clinic on 11/11/2024. She may return to work on 11/13/2024 .    If you have any questions or concerns, please don't hesitate to call.         Sincerely,          SERVANDO Sanford        CC: No Recipients

## 2024-11-11 NOTE — PATIENT INSTRUCTIONS
Reason for Visit: 9 month Well Child Exam    Anastasiia López is a 9 month old female who presents for well baby exam. Patient presents with Mother.    Concerns raised today include:   none    Interval Changes:  Birth history, medical history, surgical history, and family history reviewed and updated. Chronic medical conditions: reflux. No recent illnesses or injuries. No prior surgeries. No known allergies. Daily medications include: Prilosec.    Feeding: baby food, just started soft solids, 24oz formula/day     Sleeping: sleeping well overnight    Elimination:  Normal wet diapers and bowel movements.    Vision/Hearing: no concerns     Dental: discussed brushing teeth    Safety: in rear facing car seat    SOCIAL:  Primary caretakers: mother, grandparents  Lives with: mother, stays with grandmother 1x/weekly  : family member (grandmother)    Tobacco Use: Never             DEVELOPMENT:  ASQ-3 Screen    Results: Some Concerns/Monitor   Communication: Monitoring Score: 30   Gross Motor: Reassuring Score: 35   Fine Motor: Reassuring Score: 60   Problem Solving: Reassuring Score: 50   Personal-Social: Reassuring Score: 50              PHYSICAL EXAM:  Height 29\" (73.7 cm), weight 7.357 kg, head circumference 44 cm (17.32\").  GEN:  Well appearing female infant, nontoxic, no acute distress.  Alert and     interactive.  SKIN: Warm, normal turgor.  No cyanosis.  No bruises or lesions.  HEAD:  Normocephalic, atraumatic.  Anterior fontanel open, soft and flat.  EYES:  Conjunctivae appear normal, non-injected, non-icteric.  NOSE:  Appears normal, no flaring.  EARS:  Normal pinnae, no preauricular skin tags or pit.  TMs are transparent with good    landmarks.  THROAT:  Oropharynx with moist mucous membranes and no lesions.  NECK:  Supple, no lymphadenopathy or masses.  HEART:  Regular rate and rhythm.  Quiet precordium.  Normal S1, S2.  No murmurs, rubs, gallops. Equal femoral pulses.  LUNGS:  Clear to auscultation  Symptoms of a viral infection may linger for up to 10 days. Your contagious period is the first 5-7 days of symptom onset. Continue over-the-counter products for symptoms: tylenol for fevers, ibuprofen for body aches, flonase (fluticasone) with nasal saline for congestion, mucinex for cough, and airborne/emergen-c for vitamin supplementation. Follow-up with PCP in 3-5 days. Report to ER if symptoms worsen.      bilaterally.  No wheezes, rales, rhonchi.  Normal work of breathing.  ABD:  Soft, nontender.  No organomegaly or masses.  :  Chemo 1 female  MSK:  Hips within normal range of motion.  Negative Valladares, Ortolani.  Spine straight.  Normal sacrum.  EXT:  Warm, dry, without abnormalities.  NEURO:  Normal tone, bulk, strength.    ASSESSMENT:  9 month old female well infant.  Normal growth and development - will follow-up with communication at 12 year WCE.  Walks on toes but will stand flat footed -will continue to monitor, reassured mother that this can still be normal at this age.     PLAN:   1. All parental concerns and questions discussed.  2. Vaccines: UTD  3. Anticipatory guidance provided regarding: nutrition, sleep, safety and development.    FOLLOW UP:  RTC for 12 month WCE or sooner prn illness/concerns.

## 2024-11-12 LAB
FLUAV RNA RESP QL NAA+PROBE: NEGATIVE
FLUBV RNA RESP QL NAA+PROBE: NEGATIVE
SARS-COV-2 RNA RESP QL NAA+PROBE: NEGATIVE

## 2024-11-13 LAB — BACTERIA THROAT CULT: NORMAL

## 2024-11-16 ENCOUNTER — OFFICE VISIT (OUTPATIENT)
Dept: URGENT CARE | Facility: CLINIC | Age: 24
End: 2024-11-16
Payer: COMMERCIAL

## 2024-11-16 VITALS
HEART RATE: 83 BPM | BODY MASS INDEX: 38.21 KG/M2 | OXYGEN SATURATION: 99 % | SYSTOLIC BLOOD PRESSURE: 141 MMHG | WEIGHT: 258 LBS | DIASTOLIC BLOOD PRESSURE: 94 MMHG | HEIGHT: 69 IN | RESPIRATION RATE: 22 BRPM | TEMPERATURE: 98.2 F

## 2024-11-16 DIAGNOSIS — N30.00 ACUTE CYSTITIS WITHOUT HEMATURIA: Primary | ICD-10-CM

## 2024-11-16 LAB
SL AMB  POCT GLUCOSE, UA: ABNORMAL
SL AMB LEUKOCYTE ESTERASE,UA: ABNORMAL
SL AMB POCT BILIRUBIN,UA: ABNORMAL
SL AMB POCT BLOOD,UA: ABNORMAL
SL AMB POCT CLARITY,UA: ABNORMAL
SL AMB POCT COLOR,UA: ABNORMAL
SL AMB POCT KETONES,UA: ABNORMAL
SL AMB POCT NITRITE,UA: ABNORMAL
SL AMB POCT PH,UA: 5
SL AMB POCT SPECIFIC GRAVITY,UA: 1020
SL AMB POCT URINE PROTEIN: ABNORMAL
SL AMB POCT UROBILINOGEN: 0.2

## 2024-11-16 PROCEDURE — 87077 CULTURE AEROBIC IDENTIFY: CPT

## 2024-11-16 PROCEDURE — 87086 URINE CULTURE/COLONY COUNT: CPT

## 2024-11-16 PROCEDURE — 99213 OFFICE O/P EST LOW 20 MIN: CPT

## 2024-11-16 PROCEDURE — 87186 SC STD MICRODIL/AGAR DIL: CPT

## 2024-11-16 PROCEDURE — 81002 URINALYSIS NONAUTO W/O SCOPE: CPT

## 2024-11-16 RX ORDER — NITROFURANTOIN 25; 75 MG/1; MG/1
100 CAPSULE ORAL 2 TIMES DAILY
Qty: 10 CAPSULE | Refills: 0 | Status: SHIPPED | OUTPATIENT
Start: 2024-11-16 | End: 2024-11-21

## 2024-11-16 NOTE — PATIENT INSTRUCTIONS
-Complete all the pills of the antibiotics medication, even if feeling better. If you do not take all the pills, this can make future infections difficult or unable to be treated. You should not have any leftover medication at the end.    -Stay hydrated with water to help flush out bacteria.     -Urine culture sent, we will notify you if any changes need to be made to your medications.    -Follow up with your regular family doctor in 3-5 days.    -Proceed to emergency room if symptoms get worse such as fever, nausea/vomiting, back pain, weakness, or if symptoms not improving after starting antibiotic.

## 2024-11-16 NOTE — PROGRESS NOTES
[facility]        NAME: Laurence Kim is a 24 y.o. female  : 2000    MRN: [id number]  DATE: 2024  TIME: 2:35 PM    Assessment and Plan   Frequent urination [R35.0]  1. Frequent urination  POCT urine dip    Urine culture    Urine culture          Started on antibiotic , complete entire course.  Will follow up on urine culture results, and will change antibiotic as needed.  Educated on when to call back and present to ER with symptoms.       Patient Instructions     -Complete all the pills of the antibiotics medication, even if feeling better. If you do not take all the pills, this can make future infections difficult or unable to be treated. You should not have any leftover medication at the end.    -Stay hydrated with water to help flush out bacteria.     -Urine culture sent, we will notify you if any changes need to be made to your medications.    -Follow up with your regular family doctor in 3-5 days.    -Proceed to emergency room if symptoms get worse such as fever, nausea/vomiting, back pain, weakness, or if symptoms not improving after starting antibiotic.     Chief Complaint     Chief Complaint   Patient presents with    Possible UTI     Symptoms started about two days ago,          History of Present Illness       Does not feel any symptoms that began 2 days ago.  Symptoms include dysuria frequency.  Denies no changes with vaginal discharge concerns for STI testing of pregnancy at this time.  Denies flank pain dizziness any fevers chills abdominal pain.        Review of Systems   Review of Systems   Constitutional:  Negative for chills and fever.   Respiratory:  Negative for cough and shortness of breath.    Cardiovascular:  Negative for chest pain.   Gastrointestinal:  Negative for abdominal pain.   Genitourinary:  Positive for dysuria, frequency, pelvic pain (cramping) and urgency. Negative for vaginal bleeding and vaginal discharge.   Musculoskeletal:  Negative for  myalgias.         Current Medications       Current Outpatient Medications:     albuterol (PROVENTIL HFA,VENTOLIN HFA) 90 mcg/act inhaler, 2 puff(s), Disp: , Rfl:     atomoxetine (STRATTERA) 80 MG capsule, Take 1 capsule (80 mg total) by mouth daily, Disp: 90 capsule, Rfl: 0    lurasidone (LATUDA) 40 mg tablet, Take 1 tablet (40 mg total) by mouth daily with dinner, Disp: 90 tablet, Rfl: 0    pantoprazole (PROTONIX) 20 mg tablet, Take 1 tablet (20 mg total) by mouth daily before breakfast, Disp: 90 tablet, Rfl: 1    traZODone (DESYREL) 50 mg tablet, Take 1 tablet (50 mg total) by mouth daily at bedtime, Disp: 90 tablet, Rfl: 0    venlafaxine (EFFEXOR-XR) 150 mg 24 hr capsule, Take 1 capsule (150 mg total) by mouth daily, Disp: 90 capsule, Rfl: 0    polyethylene glycol (GOLYTELY) 4000 mL solution, Take 4,000 mL by mouth once for 1 dose (Patient not taking: Reported on 11/11/2024), Disp: 4000 mL, Rfl: 0    venlafaxine (EFFEXOR-XR) 37.5 mg 24 hr capsule, Take 1 capsule (37.5 mg total) by mouth daily (Patient not taking: Reported on 11/16/2024), Disp: 90 capsule, Rfl: 0    Current Facility-Administered Medications:     levonorgestrel (DIOGENES) 13.5 mg intrauterine device (IUD), 1 each, Intrauterine Device, Once every 3 years, , 1 Intra Uterine Device at 03/26/24 1242    Current Allergies     Allergies as of 11/16/2024 - Reviewed 11/16/2024   Allergen Reaction Noted    Zolpidem Hallucinations and Other (See Comments)             The following portions of the patient's history were reviewed and updated as appropriate: allergies, current medications, past family history, past medical history, past social history, past surgical history and problem list.     Past Medical History:   Diagnosis Date    Depression     Psychiatric disorder     Schizophrenia (HCC)        No past surgical history on file.    Family History   Problem Relation Age of Onset    Leukemia Mother          Medications have been verified.        Objective  "  /94   Pulse 83   Temp 98.2 °F (36.8 °C)   Resp 22   Ht 5' 9\" (1.753 m)   Wt 117 kg (258 lb)   LMP 11/04/2024   SpO2 99%   BMI 38.10 kg/m²        Physical Exam     Physical Exam  Vitals reviewed.   Constitutional:       Appearance: Normal appearance.   Cardiovascular:      Rate and Rhythm: Normal rate and regular rhythm.      Pulses: Normal pulses.      Heart sounds: Normal heart sounds. No murmur heard.  Pulmonary:      Effort: Pulmonary effort is normal. No respiratory distress.      Breath sounds: Normal breath sounds.   Abdominal:      General: Bowel sounds are normal. There is no distension.      Palpations: Abdomen is soft.      Tenderness: There is no abdominal tenderness. There is no right CVA tenderness, left CVA tenderness, guarding or rebound.   Skin:     General: Skin is warm and dry.      Capillary Refill: Capillary refill takes less than 2 seconds.   Neurological:      General: No focal deficit present.      Mental Status: She is alert and oriented to person, place, and time.   Psychiatric:         Mood and Affect: Mood normal.         Behavior: Behavior normal.                    "

## 2024-11-19 LAB
BACTERIA UR CULT: ABNORMAL
BACTERIA UR CULT: ABNORMAL

## 2024-11-27 DIAGNOSIS — F31.32 BIPOLAR 1 DISORDER, DEPRESSED, MODERATE (HCC): ICD-10-CM

## 2024-11-27 RX ORDER — TRAZODONE HYDROCHLORIDE 50 MG/1
50 TABLET, FILM COATED ORAL
Qty: 90 TABLET | Refills: 0 | Status: SHIPPED | OUTPATIENT
Start: 2024-11-27

## 2024-11-27 RX ORDER — VENLAFAXINE HYDROCHLORIDE 150 MG/1
150 CAPSULE, EXTENDED RELEASE ORAL DAILY
Qty: 90 CAPSULE | Refills: 0 | Status: SHIPPED | OUTPATIENT
Start: 2024-11-27

## 2024-11-27 NOTE — TELEPHONE ENCOUNTER
Patient contacted the office to schedule a follow up visit with provider. Patient is now scheduled for 12/13/2024  at 2:30 pm in office.

## 2024-11-27 NOTE — TELEPHONE ENCOUNTER
Left voicemail to schedule appointment with Mehdi Esteban in order to receive prescription refills.

## 2024-12-09 ENCOUNTER — TELEPHONE (OUTPATIENT)
Dept: PSYCHIATRY | Facility: CLINIC | Age: 24
End: 2024-12-09

## 2024-12-09 NOTE — TELEPHONE ENCOUNTER
Reason for call:   [x] Prior Auth  [] Other:     Caller:  [] Patient  [x] Pharmacy  Name: [id number]  Address: [facility] [place], [place]  Callback Number: [phone]    Medication: atomoxetine (STRATTERA) 80 MG capsule    Dose/Frequency: Take 1 capsule (80 mg total) by mouth daily     Quantity: 90    Ordering Provider:   [] PCP/Provider -   [x] Speciality/Provider - Psych   SERVANDO Peralta     Has the patient tried other medications and failed? If failed, which medications did they fail?    [] No   [] Yes -     Is the patient's insurance updated in EPIC?   [] Yes   [] No     Is a copy of the patient's insurance scanned in EPIC?   [] Yes   [] No

## 2024-12-09 NOTE — TELEPHONE ENCOUNTER
PA for Atomoxetine 80 mg SUBMITTED to [employer]     via    [x]CMM-KEY: [id number]  []Surescripts-Case ID #   []Availity-Auth ID # NDC #   []Faxed to plan   []Other website   []Phone call Case ID #     []PA sent as URGENT    All office notes, labs and other pertaining documents and studies sent. Clinical questions answered. Awaiting determination from insurance company.     Turnaround time for your insurance to make a decision on your Prior Authorization can take 7-21 business days.

## 2024-12-09 NOTE — LETTER
ATTN: Member Advocate      Patient: Laurence Kim :2000 Member ID:[id number]   Re: Request for Reconsideration of Semdlclmnns47 mg       To Whom It May Concern:   Laurence Kim 2000 was denied coverage for the medication Atomoxetine 80 mg  on 12/10/2024. The denial reason was stated as not covered due to not meeting insurance criteria. I am requesting a redetermination of the denial of coverage due to patient having a significant history of inattentiveness, being easily distracted, being very forgetful in her daily life, decreased periods of productivity, and periods of feeling scattered.  The initiation of Strattera has drastically improved the symptoms and has allowed her to have an improved quality of life in terms of being able to hold a job and have a steady income.      In conclusion, please reconsider the denial of Atomoxetine 80 mg   for my patient. I would appreciate prompt review of this appeal and approval of this FDA-approved medication. I can be reached by phone at [phone] or via fax at [phone] for additional information and discussion. Thank you.      Sincerely,   SERVANDO Peralta

## 2024-12-10 ENCOUNTER — TELEPHONE (OUTPATIENT)
Age: 24
End: 2024-12-10

## 2024-12-10 NOTE — TELEPHONE ENCOUNTER
[employer] called office wanting to inform office that the appeal has been overturned. Writer informed [employer] the message would be sent.

## 2024-12-10 NOTE — TELEPHONE ENCOUNTER
PA for Atomoxetine 80 mg  APPEALED via     []CMM  []SS  [x]Letter sent to insurance via fax [phone] [facility]   []Other site or means     All necessary records sent. Will await response from insurance company    Turnaround time for a decision to be made on an appeal could take up to 30 business days

## 2024-12-10 NOTE — TELEPHONE ENCOUNTER
PA for Atomoxetine 80 mg  DENIED    Reason:(Screenshot if applicable)        Message sent to office clinical pool Yes    Denial letter scanned into Media Yes    Appeal started No (Provider will need to decide if appeal is warranted and send clinical documentation to Prior Authorization Team for initiation.)    **Please follow up with your patient regarding denial and next steps**

## 2024-12-10 NOTE — TELEPHONE ENCOUNTER
Rep from [employer] called to inform that they are working on the appeal for Atomoxetine 80mg medication.   If any questions, please call [phone] and ask for the appeals department.

## 2024-12-13 NOTE — TELEPHONE ENCOUNTER
Message from [employer] madden was not routed to the PA team, will call and make the pharmacy aware.

## 2024-12-14 ENCOUNTER — OFFICE VISIT (OUTPATIENT)
Dept: URGENT CARE | Facility: CLINIC | Age: 24
End: 2024-12-14
Payer: COMMERCIAL

## 2024-12-14 VITALS
DIASTOLIC BLOOD PRESSURE: 84 MMHG | RESPIRATION RATE: 16 BRPM | HEART RATE: 98 BPM | BODY MASS INDEX: 38.4 KG/M2 | SYSTOLIC BLOOD PRESSURE: 106 MMHG | TEMPERATURE: 98.4 F | OXYGEN SATURATION: 98 % | WEIGHT: 260 LBS

## 2024-12-14 DIAGNOSIS — R05.1 ACUTE COUGH: ICD-10-CM

## 2024-12-14 DIAGNOSIS — J06.9 ACUTE URI: Primary | ICD-10-CM

## 2024-12-14 LAB
SARS-COV-2 AG UPPER RESP QL IA: NEGATIVE
VALID CONTROL: NORMAL

## 2024-12-14 PROCEDURE — 87811 SARS-COV-2 COVID19 W/OPTIC: CPT

## 2024-12-14 PROCEDURE — 99213 OFFICE O/P EST LOW 20 MIN: CPT

## 2024-12-14 NOTE — PROGRESS NOTES
[facility]    NAME: Laurence Kim is a 24 y.o. female  : 2000    MRN: [id number]  DATE: 2024  TIME: 10:11 AM    Assessment and Plan   Acute URI [J06.9]  1. Acute URI        2. Acute cough  Poct Covid 19 Rapid Antigen Test        Symptoms consistent with viral illness -- 4 days of symptoms, improving in the last 2 days.   Given education on supportive measures for symptoms in discharge instructions.  Follow up with primary care in 3-5 days.  Go to ER if symptoms get worse.     Patient Instructions     --Rest, drink plenty of fluids     --For nasal/sinus congestion, most oral pills do not help symptoms. Take nasal sprays such as Afrin (for 3 days use only) or Sudafed (buy at the pharmacy counter) for symptoms. Do not use these if you have heart problems/high blood pressure. You can also try Flonase, steroid nasal sprays to help.     --For cough, you can take an OTC expectorant such as plain Robitussion or Mucinex. A spoonful of honey at bedtime may also be helpful.    --For sore throat, you can take OTC lozenges, use warm gargles (salt water or apple cider vinegar and honey), herbal teas, or an OTC throat spray (Chloraseptic).    --You can take Tylenol or Motrin/Advil as needed for fever, headache, body aches. Do not take Motrin/Advil if you have a history of heart disease, bleeding ulcers, or if you take blood thinners.     -For cold symptoms with high blood pressure take Coricidin cough/cold.     -If tests have been performed at [facility], our office will contact you with results if changes need to be made to the care plan discussed with you at the visit.  [facility]    --You should contact your primary care provider and/or go to the ER if your symptoms are not improved or get worse over the next 7 days. This includes new onset fever, localized ear pain, sinus pain, as well as worsening cough, chest pain, shortness of breath, or significant  weakness/fatigue.      Chief Complaint     Chief Complaint   Patient presents with    URI     Pt c/o cough, congestion, fever, body aches that started on wednesday         History of Present Illness       Presents with 4 days of sick symptoms including cough, congestion, fever, and body aches. Fever 2 days ago, resolved now. Known sick contact with nephew. Primary concern is coughing. She does have asthma, no wheezing heard. No shortness of breath.         Review of Systems   Review of Systems   Constitutional:  Positive for fever. Negative for chills.   HENT:  Positive for congestion. Negative for sore throat.    Respiratory:  Positive for cough. Negative for shortness of breath.    Cardiovascular:  Negative for chest pain.   Gastrointestinal:  Negative for abdominal pain.   Musculoskeletal:  Positive for myalgias.   Psychiatric/Behavioral:  Negative for confusion.          Current Medications       Current Outpatient Medications:     albuterol (PROVENTIL HFA,VENTOLIN HFA) 90 mcg/act inhaler, 2 puff(s), Disp: , Rfl:     atomoxetine (STRATTERA) 80 MG capsule, Take 1 capsule (80 mg total) by mouth daily, Disp: 90 capsule, Rfl: 0    lurasidone (LATUDA) 40 mg tablet, Take 1 tablet (40 mg total) by mouth daily with dinner, Disp: 90 tablet, Rfl: 0    traZODone (DESYREL) 50 mg tablet, take 1 tablet by mouth at bedtime, Disp: 90 tablet, Rfl: 0    venlafaxine (EFFEXOR-XR) 150 mg 24 hr capsule, take 1 capsule by mouth once daily, Disp: 90 capsule, Rfl: 0    pantoprazole (PROTONIX) 20 mg tablet, Take 1 tablet (20 mg total) by mouth daily before breakfast (Patient not taking: Reported on 12/14/2024), Disp: 90 tablet, Rfl: 1    polyethylene glycol (GOLYTELY) 4000 mL solution, Take 4,000 mL by mouth once for 1 dose (Patient not taking: Reported on 11/11/2024), Disp: 4000 mL, Rfl: 0    venlafaxine (EFFEXOR-XR) 37.5 mg 24 hr capsule, Take 1 capsule (37.5 mg total) by mouth daily (Patient not taking: Reported on 11/16/2024), Disp:  90 capsule, Rfl: 0    Current Facility-Administered Medications:     levonorgestrel (DIOGENES) 13.5 mg intrauterine device (IUD), 1 each, Intrauterine Device, Once every 3 years, , 1 Intra Uterine Device at 03/26/24 1242    Current Allergies     Allergies as of 12/14/2024 - Reviewed 12/14/2024   Allergen Reaction Noted    Zolpidem Hallucinations and Other (See Comments)             The following portions of the patient's history were reviewed and updated as appropriate: allergies, current medications, past family history, past medical history, past social history, past surgical history and problem list.     Past Medical History:   Diagnosis Date    Depression     Psychiatric disorder     Schizophrenia (HCC)        History reviewed. No pertinent surgical history.    Family History   Problem Relation Age of Onset    Leukemia Mother          Medications have been verified.        Objective   /84   Pulse 98   Temp 98.4 °F (36.9 °C) (Oral)   Resp 16   Wt 118 kg (260 lb)   LMP 11/04/2024   SpO2 98%   BMI 38.40 kg/m²        Physical Exam     Physical Exam  Vitals reviewed.   Constitutional:       General: She is not in acute distress.     Appearance: Normal appearance.   HENT:      Right Ear: Tympanic membrane, ear canal and external ear normal.      Left Ear: Tympanic membrane, ear canal and external ear normal.      Nose: Nose normal.      Mouth/Throat:      Mouth: Mucous membranes are moist.      Pharynx: No posterior oropharyngeal erythema.   Eyes:      Conjunctiva/sclera: Conjunctivae normal.   Cardiovascular:      Rate and Rhythm: Normal rate and regular rhythm.      Pulses: Normal pulses.      Heart sounds: Normal heart sounds. No murmur heard.  Pulmonary:      Effort: Pulmonary effort is normal. No respiratory distress.      Breath sounds: Normal breath sounds.   Skin:     General: Skin is warm and dry.   Neurological:      General: No focal deficit present.      Mental Status: She is alert and oriented  to person, place, and time.   Psychiatric:         Mood and Affect: Mood normal.         Behavior: Behavior normal.

## 2024-12-31 DIAGNOSIS — F31.32 BIPOLAR 1 DISORDER, DEPRESSED, MODERATE (HCC): ICD-10-CM

## 2025-01-02 DIAGNOSIS — R41.840 POOR CONCENTRATION: ICD-10-CM

## 2025-01-02 DIAGNOSIS — F31.32 BIPOLAR 1 DISORDER, DEPRESSED, MODERATE (HCC): ICD-10-CM

## 2025-01-02 RX ORDER — ATOMOXETINE 80 MG/1
80 CAPSULE ORAL DAILY
Qty: 30 CAPSULE | Refills: 2 | Status: SHIPPED | OUTPATIENT
Start: 2025-01-02

## 2025-01-02 RX ORDER — LURASIDONE HYDROCHLORIDE 40 MG/1
40 TABLET, FILM COATED ORAL
Qty: 90 TABLET | Refills: 0 | Status: SHIPPED | OUTPATIENT
Start: 2025-01-02

## 2025-01-02 RX ORDER — LURASIDONE HYDROCHLORIDE 40 MG/1
40 TABLET, FILM COATED ORAL
Qty: 90 TABLET | Refills: 0 | OUTPATIENT
Start: 2025-01-02

## 2025-01-24 ENCOUNTER — TELEPHONE (OUTPATIENT)
Age: 25
End: 2025-01-24

## 2025-01-24 NOTE — TELEPHONE ENCOUNTER
Patient called in regards to being in the hospital recently and they have her breathing treatment but they did not give her a nebulizer machine. Patient would like provider to place an order for nebulizer machine and would like a call back.

## 2025-01-28 ENCOUNTER — OFFICE VISIT (OUTPATIENT)
Dept: PSYCHIATRY | Facility: CLINIC | Age: 25
End: 2025-01-28
Payer: COMMERCIAL

## 2025-01-28 DIAGNOSIS — F41.1 GENERALIZED ANXIETY DISORDER: ICD-10-CM

## 2025-01-28 DIAGNOSIS — R41.840 POOR CONCENTRATION: ICD-10-CM

## 2025-01-28 DIAGNOSIS — F31.32 BIPOLAR 1 DISORDER, DEPRESSED, MODERATE (HCC): Primary | ICD-10-CM

## 2025-01-28 PROCEDURE — 99214 OFFICE O/P EST MOD 30 MIN: CPT

## 2025-01-28 RX ORDER — ALBUTEROL SULFATE 0.83 MG/ML
SOLUTION RESPIRATORY (INHALATION)
COMMUNITY
Start: 2025-01-27

## 2025-01-28 RX ORDER — ONDANSETRON 4 MG/1
4 TABLET, ORALLY DISINTEGRATING ORAL EVERY 8 HOURS PRN
COMMUNITY
Start: 2025-01-08

## 2025-01-28 RX ORDER — TRAZODONE HYDROCHLORIDE 50 MG/1
50 TABLET, FILM COATED ORAL
Qty: 90 TABLET | Refills: 0 | Status: SHIPPED | OUTPATIENT
Start: 2025-01-28

## 2025-01-28 RX ORDER — VENLAFAXINE HYDROCHLORIDE 150 MG/1
150 CAPSULE, EXTENDED RELEASE ORAL DAILY
Qty: 90 CAPSULE | Refills: 0 | Status: SHIPPED | OUTPATIENT
Start: 2025-01-28

## 2025-01-28 NOTE — PSYCH
Regular Visit    Assessment & Plan           Encounter provider SERVANDO Peralta    Provider located at    [facility] [place]  [address] [facility] [address] [phone]    Recent Visits  Date Type Provider Dept   01/28/25 Office Visit SERVANDO Peralta  [facility]   Showing recent visits within past 7 days and meeting all other requirements  Future Appointments  No visits were found meeting these conditions.  Showing future appointments within next 150 days and meeting all other requirements       HPI     Current Outpatient Medications   Medication Sig Dispense Refill   • albuterol (2.5 mg/3 mL) 0.083 % nebulizer solution      • albuterol (PROVENTIL HFA,VENTOLIN HFA) 90 mcg/act inhaler 2 puff(s)     • atomoxetine (STRATTERA) 80 MG capsule Take 1 capsule (80 mg total) by mouth daily 30 capsule 2   • lurasidone (LATUDA) 40 mg tablet Take 1 tablet (40 mg total) by mouth daily with dinner 90 tablet 0   • ondansetron (ZOFRAN-ODT) 4 mg disintegrating tablet Take 4 mg by mouth every 8 (eight) hours as needed for nausea or vomiting     • traZODone (DESYREL) 50 mg tablet Take 1 tablet (50 mg total) by mouth daily at bedtime 90 tablet 0   • venlafaxine (EFFEXOR-XR) 150 mg 24 hr capsule Take 1 capsule (150 mg total) by mouth daily 90 capsule 0   • pantoprazole (PROTONIX) 20 mg tablet Take 1 tablet (20 mg total) by mouth daily before breakfast (Patient not taking: Reported on 12/14/2024) 90 tablet 1   • polyethylene glycol (GOLYTELY) 4000 mL solution Take 4,000 mL by mouth once for 1 dose (Patient not taking: Reported on 11/11/2024) 4000 mL 0     Current Facility-Administered Medications   Medication Dose Route Frequency Provider Last Rate Last Admin   • levonorgestrel (DIOGENES) 13.5 mg intrauterine device (IUD)  1 each Intrauterine Device Once every 3 years    1 Intra Uterine Device at 03/26/24 1242       Review of  Systems        MEDICATION MANAGEMENT NOTE        [facility]    Name and Date of Birth:  Laurence Kim 24 y.o. 2000 MRN: [id number]    Date of Visit: January 29, 2025    Allergies   Allergen Reactions   • Zolpidem Hallucinations and Other (See Comments)     Other reaction(s): Hallucinations         Assessment & Plan  Bipolar 1 disorder, depressed, moderate (HCC)  -Continue Latuda to 40mg. Denies all side effects, takes with food. ----Complete pending antipsychotic blood work from last year   Patient education completed for Latuda, risks include:  NMS, leukopenia, neutropenia, tardive dyskinesia, angioedema, suicidality, diabetes, orthostatic hypotension, somnolence or insomnia, GI upset, hyperprolactinemia, akathisia, and possible weight gain (less than other SGA's), etc.     -Continue Trazodone 50mg hs prn for sleep   PARQ completed including dizziness, headache, GI distress, sedation, confusion, priapism, suicidal thoughts, serotonin syndrome, drug interactions, induction of orville and others.   Poor concentration  -Continue Strattera to 80 mg for poor concentration and attention symptoms   Generalized anxiety disorder    -Continue Effexor 150mg daily for improved anxiety control, denies all side effects bp 134/85  PARQ completed including serotonin syndrome, SIADH, worsening depression, suicidality, induction of orville, GI upset, headaches, activation, sexual side effects, sedation, potential drug interactions, and others.             SUBJECTIVE:    Laurence is seen today for a follow up for Bipolar Disorder, Generalized Anxiety Disorder and insomnia. She continues to do fairly well since the last visit.  Patient presents at baseline with no acute psychiatric concerns.  Continues to report effectiveness of current medication regimen of Latuda, Effexor, Strattera, trazodone.  With no reported adverse effects.  She requested another referral for official ADHD  testing and referral placed.  Reports depression 4/10 with periods of dysphoria, low energy somewhat exacerbated by seasonal changes but denies symptoms impacting her daily life.  Anxiety is mostly controlled with current Effexor and no report of panic symptoms.  Sleep continues to be adequate with trazodone.  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  Has been going to psychotherapy for further coping skills, support.  Continue to recommend healthy diet and regular exercise to avoid metabolic side effects.  She is also yet to complete her pending blood work which was printed out today as a reminder.  No evidence of mood elevation.  Denies SI.      She denies any side effects from medications.    PLAN:    -Continue Latuda to 40mg. Denies all side effects, takes with food. ----Complete pending antipsychotic blood work from last year   Patient education completed for Latuda, risks include:  NMS, leukopenia, neutropenia, tardive dyskinesia, angioedema, suicidality, diabetes, orthostatic hypotension, somnolence or insomnia, GI upset, hyperprolactinemia, akathisia, and possible weight gain (less than other SGA's), etc.     -Continue Effexor 150mg daily for improved anxiety control, denies all side effects bp 134/85  PARQ completed including serotonin syndrome, SIADH, worsening depression, suicidality, induction of orville, GI upset, headaches, activation, sexual side effects, sedation, potential drug interactions, and others.     -Continue Strattera to 80 mg for poor concentration and attention symptoms     -Continue Trazodone 50mg hs prn for sleep   PARQ completed including dizziness, headache, GI distress, sedation, confusion, priapism, suicidal thoughts, serotonin syndrome, drug interactions, induction of orville and others.       Aware of 24 hour and weekend coverage for urgent situations accessed by calling [facility] main practice number  Referral for individual  psychotherapy    Diagnoses and all orders for this visit:    Bipolar 1 disorder, depressed, moderate (HCC)  -     venlafaxine (EFFEXOR-XR) 150 mg 24 hr capsule; Take 1 capsule (150 mg total) by mouth daily  -     traZODone (DESYREL) 50 mg tablet; Take 1 tablet (50 mg total) by mouth daily at bedtime    Poor concentration  -     Ambulatory Referral to Neuropsychology; Future    Generalized anxiety disorder    Other orders  -     ondansetron (ZOFRAN-ODT) 4 mg disintegrating tablet; Take 4 mg by mouth every 8 (eight) hours as needed for nausea or vomiting  -     albuterol (2.5 mg/3 mL) 0.083 % nebulizer solution              Current Outpatient Medications on File Prior to Visit   Medication Sig Dispense Refill   • albuterol (2.5 mg/3 mL) 0.083 % nebulizer solution      • albuterol (PROVENTIL HFA,VENTOLIN HFA) 90 mcg/act inhaler 2 puff(s)     • atomoxetine (STRATTERA) 80 MG capsule Take 1 capsule (80 mg total) by mouth daily 30 capsule 2   • lurasidone (LATUDA) 40 mg tablet Take 1 tablet (40 mg total) by mouth daily with dinner 90 tablet 0   • ondansetron (ZOFRAN-ODT) 4 mg disintegrating tablet Take 4 mg by mouth every 8 (eight) hours as needed for nausea or vomiting     • pantoprazole (PROTONIX) 20 mg tablet Take 1 tablet (20 mg total) by mouth daily before breakfast (Patient not taking: Reported on 12/14/2024) 90 tablet 1   • polyethylene glycol (GOLYTELY) 4000 mL solution Take 4,000 mL by mouth once for 1 dose (Patient not taking: Reported on 11/11/2024) 4000 mL 0     Current Facility-Administered Medications on File Prior to Visit   Medication Dose Route Frequency Provider Last Rate Last Admin   • levonorgestrel (DIOGENES) 13.5 mg intrauterine device (IUD)  1 each Intrauterine Device Once every 3 years    1 Intra Uterine Device at 03/26/24 1242         HPI ROS Appetite Changes and Sleep:     She reports difficulty falling asleep, frequent awakenings, adequate appetite, low energy. Denies homicidal ideation, denies  suicidal ideation    Review Of Systems:      HPI ROS:               Medication Side Effects:  denies    Depression (10 worst): 4/10    Anxiety (10 worst): 2-4/10    Safety concerns (SI, HI, etc): Denies si and hi    Sleep: 7 hrs    Energy: Fair to low    Appetite: 2-3 meals    Weight Change: denies        Mental Status Evaluation:    Appearance Adequate hygiene and grooming   Behavior calm and cooperative   Mood anxious and improving  Depression Scale - 4 of 10 (0 = No depression)  Anxiety Scale - 4 of 10 (0 = No anxiety)   Speech Normal rate and volume   Affect mood-congruent   Thought Processes Goal directed and coherent   Thought Content Does not verbalize delusional material   Associations Tightly connected   Perceptual Disturbances Denies hallucinations and does not appear to be responding to internal stimuli   Risk Potential Suicidal/Homicidal Ideation - No evidence of suicidal or homicidal ideation and patient does not verbalize suicidal or homicidal ideation  Risk of Violence - No evidence of risk for violence found on assessment  Risk of Self Mutilation - No evidence of risk for self mutilation found on assessment   Orientation oriented to person, place, time/date and situation   Memory recent and remote memory grossly intact   Consciousness alert and awake   Attention/Concentration attention span and concentration appear shorter than expected for age   Insight intact   Judgement intact   Muscle Strength and Gait normal muscle strength and normal muscle tone, normal gait/station and normal balance   Motor Activity no abnormal movements   Language no difficulty naming common objects, no difficulty repeating a phrase, no difficulty writing a sentence   Fund of Knowledge adequate knowledge of current events  adequate fund of knowledge regarding past history  adequate fund of knowledge regarding vocabulary      Traumatic History Update:     New Onset of Abuse Since Last Encounter:   no  Traumatic Events Since  Last Encounter:   no    Past Medical History:    Past Medical History:   Diagnosis Date   • Depression    • Psychiatric disorder    • Schizophrenia (HCC)         History reviewed. No pertinent surgical history.  Allergies   Allergen Reactions   • Zolpidem Hallucinations and Other (See Comments)     Other reaction(s): Hallucinations     Substance Abuse History:    Social History     Substance and Sexual Activity   Alcohol Use Yes    Comment: rarely     Social History     Substance and Sexual Activity   Drug Use Yes   • Types: Marijuana     Social History:    Social History     Socioeconomic History   • Marital status: Single     Spouse name: Not on file   • Number of children: Not on file   • Years of education: Not on file   • Highest education level: Not on file   Occupational History   • Not on file   Tobacco Use   • Smoking status: Never   • Smokeless tobacco: Never   Vaping Use   • Vaping status: Never Used   Substance and Sexual Activity   • Alcohol use: Yes     Comment: rarely   • Drug use: Yes     Types: Marijuana   • Sexual activity: Yes     Partners: Female   Other Topics Concern   • Not on file   Social History Narrative   • Not on file     Social Drivers of Health     Financial Resource Strain: Not on file   Food Insecurity: Not on file   Transportation Needs: Not on file   Physical Activity: Not on file   Stress: Not on file   Social Connections: Unknown (6/18/2024)    Received from [facility]    • How often do you feel lonely or isolated from those around you? (Adult - for ages 18 years and over): Not on file   Intimate Partner Violence: Not on file   Housing Stability: Not on file     Family Psychiatric History:     Family History   Problem Relation Age of Onset   • Leukemia Mother      History Review:The following portions of the patient's history were reviewed and updated as appropriate: allergies, current medications, past family history, past medical history, past social  history, past surgical history and problem list     OBJECTIVE:     Vital signs in last 24 hours:    There were no vitals filed for this visit.  Laboratory Results:   Recent Labs (last 2 months):   Office Visit on 12/14/2024   Component Date Value   • POCT SARS-CoV-2 Ag 12/14/2024 Negative    • VALID CONTROL 12/14/2024 Valid      I have personally reviewed all pertinent laboratory/tests results.    Suicide/Homicide Risk Assessment:    Risk of Harm to Self:  Protective Factors: no current suicidal ideation, access to mental health treatment, compliant with medications, compliant with mental health treatment, good self-esteem, having a desire to be alive, having a desire to live, having a sense of purpose or meaning in life  Based on today's assessment, Laurence presents the following risk of harm to self: minimal    Risk of Harm to Others:  Based on today's assessment, Laurence presents the following risk of harm to others: minimal    The following interventions are recommended: referral for psychotherapy    Medications Risks/Benefits:      Risks, Benefits And Possible Side Effects Of Medications:    Discussed risks and benefits of treatment with patient including risk of suicidality, serotonin syndrome, increased QTc interval and SIADH related to treatment with antidepressants; Risk of induction of manic symptoms in certain patient populations and risk of parkinsonian symptoms, metabolic syndrome, tardive dyskinesia and neuroleptic malignant syndrome related to treatment with antipsychotic medications     Controlled Medication Discussion:     Not applicable    Treatment Plan:    Due for update/Updated:   no  Last treatment plan done 9/10, due 3/10/25    SERVANDO Peralta 01/29/25    Visit Time    Visit Start Time: 230  Visit Stop Time: 250  Total Visit Duration:  20 minutes

## 2025-01-30 ENCOUNTER — OFFICE VISIT (OUTPATIENT)
Age: 25
End: 2025-01-30
Payer: COMMERCIAL

## 2025-01-30 VITALS
SYSTOLIC BLOOD PRESSURE: 110 MMHG | RESPIRATION RATE: 18 BRPM | DIASTOLIC BLOOD PRESSURE: 70 MMHG | BODY MASS INDEX: 38.21 KG/M2 | WEIGHT: 258 LBS | TEMPERATURE: 98 F | OXYGEN SATURATION: 95 % | HEIGHT: 69 IN | HEART RATE: 91 BPM

## 2025-01-30 DIAGNOSIS — J06.9 ACUTE UPPER RESPIRATORY INFECTION: Primary | ICD-10-CM

## 2025-01-30 PROCEDURE — 99214 OFFICE O/P EST MOD 30 MIN: CPT | Performed by: PHYSICIAN ASSISTANT

## 2025-01-30 RX ORDER — FLUTICASONE PROPIONATE 50 MCG
1 SPRAY, SUSPENSION (ML) NASAL DAILY
Qty: 48 G | Refills: 1 | Status: SHIPPED | OUTPATIENT
Start: 2025-01-30

## 2025-01-30 NOTE — PROGRESS NOTES
Name: Laurence Kim      : 2000      MRN: [id number]  Encounter Provider: Gunjan Mcneal PA-C  Encounter Date: 2025   Encounter department: [facility]  :  Assessment & Plan  Acute upper respiratory infection  ER records from [facility] from  have been reviewed  -I did recommend starting Flonase as directed.  I did send a prescription to the pharmacy  - Take over-the-counter generic Sudafed 30 mg 2 tablets every 6 hours as needed.  Advised to asked the pharmacist for the medication.  Increase clear liquids because it will dry mucous membranes  - Over-the-counter generic Mucinex DM as needed as package directs  - Nasal decongestant prior for 3 to 5 days if needed  - She does have a nebulizer at home but she does not have the tubing.  I did give her the tubing from the office here  - She is aware that the rescue inhaler is the same as the solution for the nebulizer.  I did advise her not to utilize both but one of the other every 6 hours as needed.  - She did utilize her rescue inhaler in the office but I did notice that she was not utilizing it properly.  I did give her proper instruction on use of inhaler  - She has been advised to go to the ER with any increasing symptoms otherwise recommend she schedule her annual physical.    M*Modal software was used to dictate this note. It may contain errors with dictating incorrect words/spelling. Please contact provider directly for any questions.     Orders:    fluticasone (FLONASE) 50 mcg/act nasal spray; 1 spray into each nostril daily           History of Present Illness   Patient presents today for follow-up from a recent ER visit at [facility] on .  She states that she was given Tessalon Perles along with a rescue inhaler.  She still continues with nasal congestion, postnasal drip, cough.  She does not feel as though her rescue inhaler is helping her.  She last used it last evening.   "She does have it with her today.  She denies any fever, chills, shortness of breath.      Review of Systems   Constitutional:  Negative for chills and fever.   HENT:  Positive for ear pain (She does have some comfort of her right ear.  Denies any drainage). Negative for congestion.    Respiratory:  Positive for cough. Negative for shortness of breath and wheezing.        Objective   /70 (BP Location: Left arm, Patient Position: Sitting, Cuff Size: Large)   Pulse 91   Temp 98 °F (36.7 °C) (Temporal)   Resp 18   Ht 5' 9\" (1.753 m)   Wt 117 kg (258 lb)   LMP 01/08/2025 (Exact Date)   SpO2 95%   BMI 38.10 kg/m²      Physical Exam  Vitals reviewed.   Constitutional:       General: She is not in acute distress.     Appearance: Normal appearance. She is well-developed. She is not ill-appearing, toxic-appearing or diaphoretic.   HENT:      Head: Normocephalic and atraumatic.      Right Ear: Tympanic membrane normal. There is no impacted cerumen.      Left Ear: Tympanic membrane normal. There is no impacted cerumen.   Neck:      Thyroid: No thyromegaly.   Cardiovascular:      Rate and Rhythm: Normal rate and regular rhythm.      Heart sounds: Normal heart sounds. No murmur heard.  Pulmonary:      Effort: Pulmonary effort is normal. No respiratory distress.      Breath sounds: Normal breath sounds. No wheezing, rhonchi or rales.   Musculoskeletal:         General: No deformity.      Cervical back: Neck supple.   Lymphadenopathy:      Cervical: No cervical adenopathy.   Skin:     General: Skin is warm.   Neurological:      General: No focal deficit present.      Mental Status: She is alert.   Psychiatric:         Mood and Affect: Mood normal.         Behavior: Behavior normal.         Thought Content: Thought content normal.         Judgment: Judgment normal.         "

## 2025-02-10 ENCOUNTER — APPOINTMENT (OUTPATIENT)
Age: 25
End: 2025-02-10
Payer: COMMERCIAL

## 2025-02-10 DIAGNOSIS — R19.8 IRREGULAR BOWEL HABITS: ICD-10-CM

## 2025-02-10 DIAGNOSIS — F31.32 BIPOLAR 1 DISORDER, DEPRESSED, MODERATE (HCC): ICD-10-CM

## 2025-02-10 LAB
BASOPHILS # BLD AUTO: 0.07 THOUSANDS/ΜL (ref 0–0.1)
BASOPHILS NFR BLD AUTO: 1 % (ref 0–1)
EOSINOPHIL # BLD AUTO: 0.4 THOUSAND/ΜL (ref 0–0.61)
EOSINOPHIL NFR BLD AUTO: 5 % (ref 0–6)
ERYTHROCYTE [DISTWIDTH] IN BLOOD BY AUTOMATED COUNT: 13.5 % (ref 11.6–15.1)
EST. AVERAGE GLUCOSE BLD GHB EST-MCNC: 108 MG/DL
FERRITIN SERPL-MCNC: 11 NG/ML (ref 11–307)
HBA1C MFR BLD: 5.4 %
HCT VFR BLD AUTO: 42.4 % (ref 34.8–46.1)
HGB BLD-MCNC: 13.7 G/DL (ref 11.5–15.4)
IMM GRANULOCYTES # BLD AUTO: 0.02 THOUSAND/UL (ref 0–0.2)
IMM GRANULOCYTES NFR BLD AUTO: 0 % (ref 0–2)
LYMPHOCYTES # BLD AUTO: 2.65 THOUSANDS/ΜL (ref 0.6–4.47)
LYMPHOCYTES NFR BLD AUTO: 31 % (ref 14–44)
MCH RBC QN AUTO: 27.4 PG (ref 26.8–34.3)
MCHC RBC AUTO-ENTMCNC: 32.3 G/DL (ref 31.4–37.4)
MCV RBC AUTO: 85 FL (ref 82–98)
MONOCYTES # BLD AUTO: 0.56 THOUSAND/ΜL (ref 0.17–1.22)
MONOCYTES NFR BLD AUTO: 7 % (ref 4–12)
NEUTROPHILS # BLD AUTO: 4.91 THOUSANDS/ΜL (ref 1.85–7.62)
NEUTS SEG NFR BLD AUTO: 56 % (ref 43–75)
NRBC BLD AUTO-RTO: 0 /100 WBCS
PLATELET # BLD AUTO: 315 THOUSANDS/UL (ref 149–390)
PMV BLD AUTO: 11.3 FL (ref 8.9–12.7)
RBC # BLD AUTO: 5 MILLION/UL (ref 3.81–5.12)
TSH SERPL DL<=0.05 MIU/L-ACNC: 0.77 UIU/ML (ref 0.45–4.5)
WBC # BLD AUTO: 8.61 THOUSAND/UL (ref 4.31–10.16)

## 2025-02-10 PROCEDURE — 80053 COMPREHEN METABOLIC PANEL: CPT

## 2025-02-10 PROCEDURE — 36415 COLL VENOUS BLD VENIPUNCTURE: CPT

## 2025-02-10 PROCEDURE — 84443 ASSAY THYROID STIM HORMONE: CPT

## 2025-02-10 PROCEDURE — 86364 TISS TRNSGLTMNASE EA IG CLAS: CPT

## 2025-02-10 PROCEDURE — 83036 HEMOGLOBIN GLYCOSYLATED A1C: CPT

## 2025-02-10 PROCEDURE — 82784 ASSAY IGA/IGD/IGG/IGM EACH: CPT

## 2025-02-10 PROCEDURE — 80061 LIPID PANEL: CPT

## 2025-02-10 PROCEDURE — 85025 COMPLETE CBC W/AUTO DIFF WBC: CPT

## 2025-02-11 ENCOUNTER — RESULTS FOLLOW-UP (OUTPATIENT)
Age: 25
End: 2025-02-11

## 2025-02-11 ENCOUNTER — TELEPHONE (OUTPATIENT)
Age: 25
End: 2025-02-11

## 2025-02-11 LAB
ALBUMIN SERPL BCG-MCNC: 4.7 G/DL (ref 3.5–5)
ALP SERPL-CCNC: 66 U/L (ref 34–104)
ALT SERPL W P-5'-P-CCNC: 16 U/L (ref 7–52)
ANION GAP SERPL CALCULATED.3IONS-SCNC: 9 MMOL/L (ref 4–13)
AST SERPL W P-5'-P-CCNC: 17 U/L (ref 13–39)
BILIRUB SERPL-MCNC: 0.32 MG/DL (ref 0.2–1)
BUN SERPL-MCNC: 13 MG/DL (ref 5–25)
CALCIUM SERPL-MCNC: 9.8 MG/DL (ref 8.4–10.2)
CHLORIDE SERPL-SCNC: 107 MMOL/L (ref 96–108)
CHOLEST SERPL-MCNC: 172 MG/DL (ref ?–200)
CO2 SERPL-SCNC: 26 MMOL/L (ref 21–32)
CREAT SERPL-MCNC: 0.75 MG/DL (ref 0.6–1.3)
GFR SERPL CREATININE-BSD FRML MDRD: 110 ML/MIN/1.73SQ M
GLUCOSE P FAST SERPL-MCNC: 97 MG/DL (ref 65–99)
HDLC SERPL-MCNC: 55 MG/DL
IGA SERPL-MCNC: 216 MG/DL (ref 66–433)
IRON SATN MFR SERPL: 11 % (ref 15–50)
IRON SERPL-MCNC: 51 UG/DL (ref 50–212)
LDLC SERPL CALC-MCNC: 106 MG/DL (ref 0–100)
NONHDLC SERPL-MCNC: 117 MG/DL
POTASSIUM SERPL-SCNC: 4.8 MMOL/L (ref 3.5–5.3)
PROT SERPL-MCNC: 7.7 G/DL (ref 6.4–8.4)
SODIUM SERPL-SCNC: 142 MMOL/L (ref 135–147)
TIBC SERPL-MCNC: 443.8 UG/DL (ref 250–450)
TRANSFERRIN SERPL-MCNC: 317 MG/DL (ref 203–362)
TRIGL SERPL-MCNC: 57 MG/DL (ref ?–150)
UIBC SERPL-MCNC: 393 UG/DL (ref 155–355)

## 2025-02-11 NOTE — TELEPHONE ENCOUNTER
Please call her to verify her appointment with me for her physical in 2/25.  I did notice in the system that she is scheduled for a different family provider on March 4.  It appears she does have insurance that requires a primary care provider on her card.  Please let her know the importance of following with 1 PCP.  Thank you

## 2025-02-13 ENCOUNTER — RESULTS FOLLOW-UP (OUTPATIENT)
Age: 25
End: 2025-02-13

## 2025-02-13 LAB — TTG IGA SER IA-ACNC: 0.6 U/ML (ref ?–10)

## 2025-02-13 NOTE — RESULT ENCOUNTER NOTE
Good morning Laurence,  I did notice that you did review your lab results.  Your ferritin level is borderline.  You do have iron deficiency anemia.  I do recommend taking an over-the-counter iron supplement at least once a day.  I recommend 325 mg.  Also take vitamin C 250 mg once a day with the iron which helps with the absorption of iron.  Eat iron rich foods which includes red meat.  I do recommend you repeat your levels in about 3 months.  I do see that you are scheduled with a different primary care provider which you can discuss this further at that time.  Please let me know if you have any questions.  Thanks and have a good day,  Gunjan

## 2025-02-20 ENCOUNTER — TELEPHONE (OUTPATIENT)
Dept: OBGYN CLINIC | Facility: CLINIC | Age: 25
End: 2025-02-20

## 2025-02-25 ENCOUNTER — TELEPHONE (OUTPATIENT)
Age: 25
End: 2025-02-25

## 2025-02-25 NOTE — TELEPHONE ENCOUNTER
Patient has been added to the Talk Therapy wait list without a referral.    Insurance: [employer]  Insurance Type:    Commercial []   Medicaid [x]   [facility] (if applicable)   Medicare []  Location Preference: any  Provider Preference: any  Virtual: Yes [x] No []  Were outside resources sent: Yes [] No [x]

## 2025-03-03 PROBLEM — R53.83 FATIGUE: Status: RESOLVED | Noted: 2024-05-21 | Resolved: 2025-03-03

## 2025-03-03 PROBLEM — R06.81 APNEIC EPISODE: Status: RESOLVED | Noted: 2024-05-21 | Resolved: 2025-03-03

## 2025-03-03 PROBLEM — M54.9 MID BACK PAIN: Status: RESOLVED | Noted: 2024-05-21 | Resolved: 2025-03-03

## 2025-03-03 PROBLEM — T74.21XA SEXUAL ASSAULT OF ADULT: Status: RESOLVED | Noted: 2021-07-05 | Resolved: 2025-03-03

## 2025-03-03 PROBLEM — N64.52 BILATERAL NIPPLE DISCHARGE: Status: RESOLVED | Noted: 2021-02-12 | Resolved: 2025-03-03

## 2025-03-03 PROBLEM — Z30.46 NEXPLANON REMOVAL: Status: RESOLVED | Noted: 2018-05-22 | Resolved: 2025-03-03

## 2025-03-03 PROBLEM — M54.2 NECK PAIN: Status: RESOLVED | Noted: 2024-05-21 | Resolved: 2025-03-03

## 2025-03-26 ENCOUNTER — TELEPHONE (OUTPATIENT)
Dept: OBGYN CLINIC | Facility: CLINIC | Age: 25
End: 2025-03-26

## 2025-03-29 DIAGNOSIS — R41.840 POOR CONCENTRATION: ICD-10-CM

## 2025-03-29 DIAGNOSIS — F31.32 BIPOLAR 1 DISORDER, DEPRESSED, MODERATE (HCC): ICD-10-CM

## 2025-03-31 RX ORDER — ATOMOXETINE 80 MG/1
80 CAPSULE ORAL DAILY
Qty: 30 CAPSULE | Refills: 2 | Status: SHIPPED | OUTPATIENT
Start: 2025-03-31

## 2025-03-31 RX ORDER — LURASIDONE HYDROCHLORIDE 40 MG/1
40 TABLET, FILM COATED ORAL
Qty: 90 TABLET | Refills: 0 | Status: SHIPPED | OUTPATIENT
Start: 2025-03-31

## 2025-04-21 ENCOUNTER — TELEPHONE (OUTPATIENT)
Age: 25
End: 2025-04-21
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
## 2025-04-22 ENCOUNTER — TELEMEDICINE (OUTPATIENT)
Dept: PSYCHIATRY | Facility: CLINIC | Age: 25
End: 2025-04-22
Payer: COMMERCIAL

## 2025-04-22 DIAGNOSIS — F41.1 GENERALIZED ANXIETY DISORDER: ICD-10-CM

## 2025-04-22 DIAGNOSIS — F31.32 BIPOLAR 1 DISORDER, DEPRESSED, MODERATE (HCC): ICD-10-CM

## 2025-04-22 DIAGNOSIS — F31.32 BIPOLAR 1 DISORDER, DEPRESSED, MODERATE (HCC): Primary | ICD-10-CM

## 2025-04-22 DIAGNOSIS — R41.840 POOR CONCENTRATION: ICD-10-CM

## 2025-04-22 PROCEDURE — 99214 OFFICE O/P EST MOD 30 MIN: CPT

## 2025-04-22 RX ORDER — LURASIDONE HYDROCHLORIDE 60 MG/1
60 TABLET, FILM COATED ORAL
Qty: 90 TABLET | Refills: 0 | Status: SHIPPED | OUTPATIENT
Start: 2025-04-22

## 2025-04-22 RX ORDER — TRAZODONE HYDROCHLORIDE 50 MG/1
50 TABLET ORAL
Qty: 90 TABLET | Refills: 0 | Status: SHIPPED | OUTPATIENT
Start: 2025-04-22

## 2025-04-22 RX ORDER — TRAZODONE HYDROCHLORIDE 50 MG/1
50 TABLET ORAL
Qty: 90 TABLET | Refills: 0 | OUTPATIENT
Start: 2025-04-22

## 2025-04-22 RX ORDER — VENLAFAXINE HYDROCHLORIDE 150 MG/1
150 CAPSULE, EXTENDED RELEASE ORAL DAILY
Qty: 90 CAPSULE | Refills: 0 | OUTPATIENT
Start: 2025-04-22

## 2025-04-22 RX ORDER — VENLAFAXINE HYDROCHLORIDE 150 MG/1
150 CAPSULE, EXTENDED RELEASE ORAL DAILY
Qty: 90 CAPSULE | Refills: 0 | Status: SHIPPED | OUTPATIENT
Start: 2025-04-22
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
## 2025-04-25 ENCOUNTER — TELEPHONE (OUTPATIENT)
Dept: PSYCHIATRY | Facility: CLINIC | Age: 25
End: 2025-04-25

## 2025-04-25 NOTE — TELEPHONE ENCOUNTER
Left voicemail informing patient and/or parent/guardian of the Psych Encounter form needing to be signed as a requirement from the insurance company for billing purposes. Patient can access form via WHILL and sign electronically.     Please make patient aware this form must be signed for each visit as a requirement to continue future visits with provider.    Virtual Encounter form 4/22/25 call #1

## 2025-05-08 ENCOUNTER — VBI (OUTPATIENT)
Dept: ADMINISTRATIVE | Facility: OTHER | Age: 25
End: 2025-05-08

## 2025-05-08 NOTE — TELEPHONE ENCOUNTER
05/08/25 11:24 AM     Chart reviewed for Pap Smear (HPV) aka Cervical Cancer Screening ; nothing is submitted to the patient's insurance at this time.     Amanda Moran   [facility]
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
## 2025-05-30 ENCOUNTER — ANNUAL EXAM (OUTPATIENT)
Dept: OBGYN CLINIC | Facility: CLINIC | Age: 25
End: 2025-05-30
Payer: COMMERCIAL

## 2025-05-30 VITALS
HEIGHT: 69 IN | WEIGHT: 270 LBS | BODY MASS INDEX: 39.99 KG/M2 | SYSTOLIC BLOOD PRESSURE: 118 MMHG | DIASTOLIC BLOOD PRESSURE: 82 MMHG

## 2025-05-30 DIAGNOSIS — R10.2 PELVIC PAIN: ICD-10-CM

## 2025-05-30 DIAGNOSIS — Z12.4 CERVICAL CANCER SCREENING: ICD-10-CM

## 2025-05-30 DIAGNOSIS — R21 VULVAR RASH: ICD-10-CM

## 2025-05-30 DIAGNOSIS — Z01.419 WELL WOMAN EXAM: Primary | ICD-10-CM

## 2025-05-30 DIAGNOSIS — N94.3 PMS (PREMENSTRUAL SYNDROME): ICD-10-CM

## 2025-05-30 LAB
BACTERIA UR QL AUTO: ABNORMAL /HPF
BILIRUB UR QL STRIP: NEGATIVE
CLARITY UR: CLEAR
COLOR UR: YELLOW
GLUCOSE UR STRIP-MCNC: NEGATIVE MG/DL
HGB UR QL STRIP.AUTO: ABNORMAL
KETONES UR STRIP-MCNC: NEGATIVE MG/DL
LEUKOCYTE ESTERASE UR QL STRIP: NEGATIVE
MUCOUS THREADS UR QL AUTO: ABNORMAL
NITRITE UR QL STRIP: NEGATIVE
NON-SQ EPI CELLS URNS QL MICRO: ABNORMAL /HPF
PH UR STRIP.AUTO: 5.5 [PH]
PROT UR STRIP-MCNC: ABNORMAL MG/DL
RBC #/AREA URNS AUTO: ABNORMAL /HPF
SP GR UR STRIP.AUTO: 1.03 (ref 1–1.03)
UROBILINOGEN UR STRIP-ACNC: <2 MG/DL
WBC #/AREA URNS AUTO: ABNORMAL /HPF

## 2025-05-30 PROCEDURE — G0145 SCR C/V CYTO,THINLAYER,RESCR: HCPCS

## 2025-05-30 PROCEDURE — 99395 PREV VISIT EST AGE 18-39: CPT

## 2025-05-30 PROCEDURE — 81514 NFCT DS BV&VAGINITIS DNA ALG: CPT

## 2025-05-30 PROCEDURE — 81001 URINALYSIS AUTO W/SCOPE: CPT

## 2025-05-30 PROCEDURE — 87086 URINE CULTURE/COLONY COUNT: CPT

## 2025-05-30 RX ORDER — NORGESTREL AND ETHINYL ESTRADIOL 0.3-0.03MG
1 KIT ORAL DAILY
Qty: 84 TABLET | Refills: 0 | Status: SHIPPED | OUTPATIENT
Start: 2025-05-30

## 2025-05-30 RX ORDER — NYSTATIN AND TRIAMCINOLONE ACETONIDE 100000; 1 [USP'U]/G; MG/G
CREAM TOPICAL 2 TIMES DAILY
Qty: 30 G | Refills: 0 | Status: SHIPPED | OUTPATIENT
Start: 2025-05-30

## 2025-06-01 LAB
BACTERIA UR CULT: NORMAL
C GLABRATA DNA VAG QL NAA+PROBE: NEGATIVE
C KRUSEI DNA VAG QL NAA+PROBE: NEGATIVE
CANDIDA SP 6 PNL VAG NAA+PROBE: NEGATIVE
T VAGINALIS DNA VAG QL NAA+PROBE: NEGATIVE
VAGINOSIS/ITIS DNA PNL VAG PROBE+SIG AMP: NEGATIVE

## 2025-06-02 ENCOUNTER — RESULTS FOLLOW-UP (OUTPATIENT)
Dept: OBGYN CLINIC | Facility: CLINIC | Age: 25
End: 2025-06-02

## 2025-06-05 LAB
LAB AP GYN PRIMARY INTERPRETATION: NORMAL
Lab: NORMAL

## 2025-07-01 DIAGNOSIS — F31.32 BIPOLAR 1 DISORDER, DEPRESSED, MODERATE (HCC): ICD-10-CM

## 2025-07-01 NOTE — TELEPHONE ENCOUNTER
Requested Prescriptions     Pending Prescriptions Disp Refills    Lurasidone HCl 60 MG TABS 90 tablet 0     Sig: Take 1 tablet (60 mg total) by mouth daily with dinner

## 2025-07-02 RX ORDER — LURASIDONE HYDROCHLORIDE 60 MG/1
60 TABLET, FILM COATED ORAL
Qty: 90 TABLET | Refills: 0 | Status: SHIPPED | OUTPATIENT
Start: 2025-07-02

## 2025-07-24 ENCOUNTER — TELEMEDICINE (OUTPATIENT)
Dept: PSYCHIATRY | Facility: CLINIC | Age: 25
End: 2025-07-24
Payer: COMMERCIAL

## 2025-07-24 DIAGNOSIS — R41.840 POOR CONCENTRATION: ICD-10-CM

## 2025-07-24 DIAGNOSIS — F41.1 GENERALIZED ANXIETY DISORDER: ICD-10-CM

## 2025-07-24 DIAGNOSIS — F31.32 BIPOLAR 1 DISORDER, DEPRESSED, MODERATE (HCC): Primary | ICD-10-CM

## 2025-07-24 PROCEDURE — 99214 OFFICE O/P EST MOD 30 MIN: CPT

## 2025-07-24 RX ORDER — TRAZODONE HYDROCHLORIDE 50 MG/1
50 TABLET ORAL
Qty: 90 TABLET | Refills: 0 | Status: SHIPPED | OUTPATIENT
Start: 2025-07-24

## 2025-07-24 RX ORDER — VENLAFAXINE HYDROCHLORIDE 150 MG/1
150 CAPSULE, EXTENDED RELEASE ORAL DAILY
Qty: 90 CAPSULE | Refills: 0 | Status: SHIPPED | OUTPATIENT
Start: 2025-07-24

## 2025-07-24 RX ORDER — CLONIDINE HYDROCHLORIDE 0.1 MG/1
0.1 TABLET ORAL
Qty: 30 TABLET | Refills: 1 | Status: SHIPPED | OUTPATIENT
Start: 2025-07-24
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
## 2025-07-29 ENCOUNTER — TELEPHONE (OUTPATIENT)
Dept: PSYCHIATRY | Facility: CLINIC | Age: 25
End: 2025-07-29

## 2025-08-15 DIAGNOSIS — R41.840 POOR CONCENTRATION: ICD-10-CM

## 2025-08-15 DIAGNOSIS — F41.1 GENERALIZED ANXIETY DISORDER: ICD-10-CM

## 2025-08-18 RX ORDER — CLONIDINE HYDROCHLORIDE 0.1 MG/1
0.1 TABLET ORAL
Qty: 90 TABLET | Refills: 0 | Status: SHIPPED | OUTPATIENT
Start: 2025-08-18